# Patient Record
Sex: FEMALE | Race: WHITE | NOT HISPANIC OR LATINO | Employment: PART TIME | URBAN - METROPOLITAN AREA
[De-identification: names, ages, dates, MRNs, and addresses within clinical notes are randomized per-mention and may not be internally consistent; named-entity substitution may affect disease eponyms.]

---

## 2017-01-10 ENCOUNTER — GENERIC CONVERSION - ENCOUNTER (OUTPATIENT)
Dept: OTHER | Facility: OTHER | Age: 42
End: 2017-01-10

## 2017-01-18 ENCOUNTER — ALLSCRIPTS OFFICE VISIT (OUTPATIENT)
Dept: OTHER | Facility: OTHER | Age: 42
End: 2017-01-18

## 2017-02-01 ENCOUNTER — TRANSCRIBE ORDERS (OUTPATIENT)
Dept: ADMINISTRATIVE | Facility: HOSPITAL | Age: 42
End: 2017-02-01

## 2017-02-01 DIAGNOSIS — M54.9 BACKACHE, UNSPECIFIED: Primary | ICD-10-CM

## 2017-02-03 ENCOUNTER — ALLSCRIPTS OFFICE VISIT (OUTPATIENT)
Dept: OTHER | Facility: OTHER | Age: 42
End: 2017-02-03

## 2017-02-21 RX ORDER — TOPIRAMATE 100 MG/1
100 TABLET, FILM COATED ORAL 2 TIMES DAILY
COMMUNITY
End: 2018-02-12 | Stop reason: SDUPTHER

## 2017-02-21 RX ORDER — OMEPRAZOLE 40 MG/1
40 CAPSULE, DELAYED RELEASE ORAL DAILY
COMMUNITY
End: 2018-05-09 | Stop reason: ALTCHOICE

## 2017-02-21 RX ORDER — PROPRANOLOL HYDROCHLORIDE 60 MG/1
60 TABLET ORAL DAILY
COMMUNITY
End: 2018-05-09 | Stop reason: ALTCHOICE

## 2017-02-21 RX ORDER — FLUOXETINE HYDROCHLORIDE 90 MG/1
90 CAPSULE, DELAYED RELEASE PELLETS ORAL
COMMUNITY
End: 2018-05-09 | Stop reason: ALTCHOICE

## 2017-02-21 RX ORDER — MORPHINE SULFATE 30 MG/1
30 TABLET ORAL EVERY 4 HOURS PRN
COMMUNITY
End: 2018-05-09 | Stop reason: ALTCHOICE

## 2017-02-21 RX ORDER — BUSPIRONE HYDROCHLORIDE 30 MG/1
30 TABLET ORAL 4 TIMES DAILY PRN
COMMUNITY
End: 2019-04-10 | Stop reason: SDUPTHER

## 2017-02-21 RX ORDER — METHOCARBAMOL 750 MG/1
750 TABLET, FILM COATED ORAL 3 TIMES DAILY
COMMUNITY
End: 2018-05-09 | Stop reason: ALTCHOICE

## 2017-02-21 RX ORDER — MODAFINIL 200 MG/1
200 TABLET ORAL DAILY
COMMUNITY
End: 2018-02-12 | Stop reason: SDUPTHER

## 2017-02-23 ENCOUNTER — ANESTHESIA EVENT (OUTPATIENT)
Dept: PERIOP | Facility: HOSPITAL | Age: 42
End: 2017-02-23
Payer: COMMERCIAL

## 2017-02-24 ENCOUNTER — ANESTHESIA (OUTPATIENT)
Dept: PERIOP | Facility: HOSPITAL | Age: 42
End: 2017-02-24
Payer: COMMERCIAL

## 2017-02-24 ENCOUNTER — HOSPITAL ENCOUNTER (OUTPATIENT)
Facility: HOSPITAL | Age: 42
Setting detail: OUTPATIENT SURGERY
Discharge: HOME | End: 2017-02-24
Attending: INTERNAL MEDICINE | Admitting: INTERNAL MEDICINE
Payer: COMMERCIAL

## 2017-02-24 ENCOUNTER — GENERIC CONVERSION - ENCOUNTER (OUTPATIENT)
Dept: OTHER | Facility: OTHER | Age: 42
End: 2017-02-24

## 2017-02-24 VITALS
HEART RATE: 69 BPM | RESPIRATION RATE: 16 BRPM | DIASTOLIC BLOOD PRESSURE: 57 MMHG | TEMPERATURE: 98.1 F | BODY MASS INDEX: 24.88 KG/M2 | OXYGEN SATURATION: 100 % | WEIGHT: 145.72 LBS | HEIGHT: 64 IN | SYSTOLIC BLOOD PRESSURE: 109 MMHG

## 2017-02-24 DIAGNOSIS — K58.9 IBS (IRRITABLE BOWEL SYNDROME): ICD-10-CM

## 2017-02-24 DIAGNOSIS — K21.9 GERD (GASTROESOPHAGEAL REFLUX DISEASE): ICD-10-CM

## 2017-02-24 LAB — EXT PREGNANCY TEST URINE: NEGATIVE

## 2017-02-24 PROCEDURE — 88342 IMHCHEM/IMCYTCHM 1ST ANTB: CPT | Performed by: INTERNAL MEDICINE

## 2017-02-24 PROCEDURE — 88305 TISSUE EXAM BY PATHOLOGIST: CPT | Performed by: INTERNAL MEDICINE

## 2017-02-24 RX ORDER — PROPOFOL 10 MG/ML
INJECTION, EMULSION INTRAVENOUS AS NEEDED
Status: DISCONTINUED | OUTPATIENT
Start: 2017-02-24 | End: 2017-02-24 | Stop reason: SURG

## 2017-02-24 RX ORDER — SODIUM CHLORIDE, SODIUM LACTATE, POTASSIUM CHLORIDE, CALCIUM CHLORIDE 600; 310; 30; 20 MG/100ML; MG/100ML; MG/100ML; MG/100ML
50 INJECTION, SOLUTION INTRAVENOUS CONTINUOUS
Status: DISCONTINUED | OUTPATIENT
Start: 2017-02-24 | End: 2017-02-24 | Stop reason: HOSPADM

## 2017-02-24 RX ADMIN — PROPOFOL 100 MG: 10 INJECTION, EMULSION INTRAVENOUS at 10:13

## 2017-02-24 RX ADMIN — PROPOFOL 100 MG: 10 INJECTION, EMULSION INTRAVENOUS at 10:08

## 2017-02-24 RX ADMIN — PROPOFOL 50 MG: 10 INJECTION, EMULSION INTRAVENOUS at 10:19

## 2017-02-24 RX ADMIN — SODIUM CHLORIDE, SODIUM LACTATE, POTASSIUM CHLORIDE, AND CALCIUM CHLORIDE 50 ML/HR: .6; .31; .03; .02 INJECTION, SOLUTION INTRAVENOUS at 09:25

## 2017-03-01 ENCOUNTER — GENERIC CONVERSION - ENCOUNTER (OUTPATIENT)
Dept: OTHER | Facility: OTHER | Age: 42
End: 2017-03-01

## 2017-03-29 ENCOUNTER — ALLSCRIPTS OFFICE VISIT (OUTPATIENT)
Dept: OTHER | Facility: OTHER | Age: 42
End: 2017-03-29

## 2017-03-29 DIAGNOSIS — M54.16 RADICULOPATHY OF LUMBAR REGION: ICD-10-CM

## 2017-03-29 DIAGNOSIS — Z12.31 ENCOUNTER FOR SCREENING MAMMOGRAM FOR MALIGNANT NEOPLASM OF BREAST: ICD-10-CM

## 2017-03-29 DIAGNOSIS — M54.9 DORSALGIA: ICD-10-CM

## 2017-04-21 ENCOUNTER — ALLSCRIPTS OFFICE VISIT (OUTPATIENT)
Dept: OTHER | Facility: OTHER | Age: 42
End: 2017-04-21

## 2017-05-25 ENCOUNTER — ALLSCRIPTS OFFICE VISIT (OUTPATIENT)
Dept: OTHER | Facility: OTHER | Age: 42
End: 2017-05-25

## 2017-06-28 ENCOUNTER — ALLSCRIPTS OFFICE VISIT (OUTPATIENT)
Dept: OTHER | Facility: OTHER | Age: 42
End: 2017-06-28

## 2017-06-28 DIAGNOSIS — Z12.31 ENCOUNTER FOR SCREENING MAMMOGRAM FOR MALIGNANT NEOPLASM OF BREAST: ICD-10-CM

## 2017-08-01 ENCOUNTER — HOSPITAL ENCOUNTER (OUTPATIENT)
Dept: MAMMOGRAPHY | Facility: CLINIC | Age: 42
Discharge: HOME | End: 2017-08-01
Payer: COMMERCIAL

## 2017-08-01 DIAGNOSIS — Z12.31 ENCOUNTER FOR SCREENING MAMMOGRAM FOR MALIGNANT NEOPLASM OF BREAST: ICD-10-CM

## 2017-08-01 PROCEDURE — G0202 SCR MAMMO BI INCL CAD: HCPCS

## 2017-08-01 PROCEDURE — 77063 BREAST TOMOSYNTHESIS BI: CPT

## 2017-08-02 ENCOUNTER — ALLSCRIPTS OFFICE VISIT (OUTPATIENT)
Dept: OTHER | Facility: OTHER | Age: 42
End: 2017-08-02

## 2017-09-08 ENCOUNTER — GENERIC CONVERSION - ENCOUNTER (OUTPATIENT)
Dept: OTHER | Facility: OTHER | Age: 42
End: 2017-09-08

## 2017-09-19 ENCOUNTER — ALLSCRIPTS OFFICE VISIT (OUTPATIENT)
Dept: OTHER | Facility: OTHER | Age: 42
End: 2017-09-19

## 2017-09-25 NOTE — PRE-PROCEDURE INSTRUCTIONS
Pre-Surgery Instructions:   Medication Instructions    busPIRone (BUSPAR) 30 MG tablet Patient was instructed by Physician and understands   modafinil (PROVIGIL) 200 MG tablet Patient was instructed by Physician and understands

## 2017-09-26 ENCOUNTER — ANESTHESIA EVENT (OUTPATIENT)
Dept: PERIOP | Facility: HOSPITAL | Age: 42
End: 2017-09-26
Payer: COMMERCIAL

## 2017-09-27 ENCOUNTER — ANESTHESIA (OUTPATIENT)
Dept: PERIOP | Facility: HOSPITAL | Age: 42
End: 2017-09-27
Payer: COMMERCIAL

## 2017-09-27 ENCOUNTER — HOSPITAL ENCOUNTER (OUTPATIENT)
Facility: HOSPITAL | Age: 42
Setting detail: OUTPATIENT SURGERY
Discharge: HOME | End: 2017-09-27
Attending: SURGERY | Admitting: SURGERY
Payer: COMMERCIAL

## 2017-09-27 VITALS
SYSTOLIC BLOOD PRESSURE: 100 MMHG | HEART RATE: 66 BPM | HEIGHT: 64 IN | OXYGEN SATURATION: 98 % | RESPIRATION RATE: 13 BRPM | TEMPERATURE: 97.6 F | WEIGHT: 149 LBS | DIASTOLIC BLOOD PRESSURE: 58 MMHG | BODY MASS INDEX: 25.44 KG/M2

## 2017-09-27 PROBLEM — K40.90 LEFT INGUINAL HERNIA: Chronic | Status: ACTIVE | Noted: 2017-09-27

## 2017-09-27 LAB — EXT PREGNANCY TEST URINE: NEGATIVE

## 2017-09-27 PROCEDURE — C1781 MESH (IMPLANTABLE): HCPCS | Performed by: SURGERY

## 2017-09-27 PROCEDURE — 81025 URINE PREGNANCY TEST: CPT | Performed by: ANESTHESIOLOGY

## 2017-09-27 DEVICE — BARD SOFT MESH
Type: IMPLANTABLE DEVICE | Site: INGUINAL | Status: FUNCTIONAL
Brand: BARD SOFT MESH

## 2017-09-27 RX ORDER — FENTANYL CITRATE/PF 50 MCG/ML
50 SYRINGE (ML) INJECTION
Status: DISCONTINUED | OUTPATIENT
Start: 2017-09-27 | End: 2017-09-27 | Stop reason: HOSPADM

## 2017-09-27 RX ORDER — PROPOFOL 10 MG/ML
INJECTION, EMULSION INTRAVENOUS AS NEEDED
Status: DISCONTINUED | OUTPATIENT
Start: 2017-09-27 | End: 2017-09-27 | Stop reason: SURG

## 2017-09-27 RX ORDER — OXYCODONE HYDROCHLORIDE 5 MG/1
5 TABLET ORAL EVERY 4 HOURS PRN
Status: DISCONTINUED | OUTPATIENT
Start: 2017-09-27 | End: 2017-09-27 | Stop reason: HOSPADM

## 2017-09-27 RX ORDER — ROCURONIUM BROMIDE 10 MG/ML
INJECTION, SOLUTION INTRAVENOUS AS NEEDED
Status: DISCONTINUED | OUTPATIENT
Start: 2017-09-27 | End: 2017-09-27 | Stop reason: SURG

## 2017-09-27 RX ORDER — MEPERIDINE HYDROCHLORIDE 25 MG/ML
12.5 INJECTION INTRAMUSCULAR; INTRAVENOUS; SUBCUTANEOUS ONCE AS NEEDED
Status: DISCONTINUED | OUTPATIENT
Start: 2017-09-27 | End: 2017-09-27 | Stop reason: HOSPADM

## 2017-09-27 RX ORDER — MAGNESIUM HYDROXIDE 1200 MG/15ML
LIQUID ORAL AS NEEDED
Status: DISCONTINUED | OUTPATIENT
Start: 2017-09-27 | End: 2017-09-27 | Stop reason: HOSPADM

## 2017-09-27 RX ORDER — LIDOCAINE HYDROCHLORIDE 10 MG/ML
INJECTION, SOLUTION INFILTRATION; PERINEURAL AS NEEDED
Status: DISCONTINUED | OUTPATIENT
Start: 2017-09-27 | End: 2017-09-27 | Stop reason: SURG

## 2017-09-27 RX ORDER — DIPHENHYDRAMINE HYDROCHLORIDE 50 MG/ML
12.5 INJECTION INTRAMUSCULAR; INTRAVENOUS ONCE AS NEEDED
Status: DISCONTINUED | OUTPATIENT
Start: 2017-09-27 | End: 2017-09-27 | Stop reason: HOSPADM

## 2017-09-27 RX ORDER — MIDAZOLAM HYDROCHLORIDE 1 MG/ML
INJECTION INTRAMUSCULAR; INTRAVENOUS AS NEEDED
Status: DISCONTINUED | OUTPATIENT
Start: 2017-09-27 | End: 2017-09-27 | Stop reason: SURG

## 2017-09-27 RX ORDER — BUPIVACAINE HYDROCHLORIDE 2.5 MG/ML
INJECTION, SOLUTION EPIDURAL; INFILTRATION; INTRACAUDAL AS NEEDED
Status: DISCONTINUED | OUTPATIENT
Start: 2017-09-27 | End: 2017-09-27 | Stop reason: HOSPADM

## 2017-09-27 RX ORDER — GLYCOPYRROLATE 0.2 MG/ML
INJECTION INTRAMUSCULAR; INTRAVENOUS AS NEEDED
Status: DISCONTINUED | OUTPATIENT
Start: 2017-09-27 | End: 2017-09-27 | Stop reason: SURG

## 2017-09-27 RX ORDER — ONDANSETRON 2 MG/ML
INJECTION INTRAMUSCULAR; INTRAVENOUS AS NEEDED
Status: DISCONTINUED | OUTPATIENT
Start: 2017-09-27 | End: 2017-09-27 | Stop reason: SURG

## 2017-09-27 RX ORDER — EPHEDRINE SULFATE 50 MG/ML
INJECTION, SOLUTION INTRAVENOUS AS NEEDED
Status: DISCONTINUED | OUTPATIENT
Start: 2017-09-27 | End: 2017-09-27 | Stop reason: SURG

## 2017-09-27 RX ORDER — SODIUM CHLORIDE, SODIUM LACTATE, POTASSIUM CHLORIDE, CALCIUM CHLORIDE 600; 310; 30; 20 MG/100ML; MG/100ML; MG/100ML; MG/100ML
100 INJECTION, SOLUTION INTRAVENOUS CONTINUOUS
Status: DISCONTINUED | OUTPATIENT
Start: 2017-09-27 | End: 2017-09-27 | Stop reason: HOSPADM

## 2017-09-27 RX ORDER — ACETAMINOPHEN AND CODEINE PHOSPHATE 300; 30 MG/1; MG/1
1 TABLET ORAL EVERY 4 HOURS PRN
Qty: 30 TABLET | Refills: 0 | Status: SHIPPED | OUTPATIENT
Start: 2017-09-27 | End: 2017-10-07

## 2017-09-27 RX ORDER — FENTANYL CITRATE 50 UG/ML
INJECTION, SOLUTION INTRAMUSCULAR; INTRAVENOUS AS NEEDED
Status: DISCONTINUED | OUTPATIENT
Start: 2017-09-27 | End: 2017-09-27 | Stop reason: SURG

## 2017-09-27 RX ORDER — ONDANSETRON 2 MG/ML
4 INJECTION INTRAMUSCULAR; INTRAVENOUS EVERY 6 HOURS PRN
Status: DISCONTINUED | OUTPATIENT
Start: 2017-09-27 | End: 2017-09-27 | Stop reason: HOSPADM

## 2017-09-27 RX ORDER — MORPHINE SULFATE 2 MG/ML
2 INJECTION, SOLUTION INTRAMUSCULAR; INTRAVENOUS EVERY 2 HOUR PRN
Status: DISCONTINUED | OUTPATIENT
Start: 2017-09-27 | End: 2017-09-27 | Stop reason: HOSPADM

## 2017-09-27 RX ORDER — PROMETHAZINE HYDROCHLORIDE 25 MG/ML
25 INJECTION, SOLUTION INTRAMUSCULAR; INTRAVENOUS ONCE AS NEEDED
Status: DISCONTINUED | OUTPATIENT
Start: 2017-09-27 | End: 2017-09-27 | Stop reason: HOSPADM

## 2017-09-27 RX ADMIN — CEFAZOLIN SODIUM 2000 MG: 2 SOLUTION INTRAVENOUS at 09:25

## 2017-09-27 RX ADMIN — NEOSTIGMINE METHYLSULFATE 3 MG: 1 INJECTION, SOLUTION INTRAMUSCULAR; INTRAVENOUS; SUBCUTANEOUS at 09:52

## 2017-09-27 RX ADMIN — SODIUM CHLORIDE, SODIUM LACTATE, POTASSIUM CHLORIDE, AND CALCIUM CHLORIDE: .6; .31; .03; .02 INJECTION, SOLUTION INTRAVENOUS at 08:53

## 2017-09-27 RX ADMIN — LIDOCAINE HYDROCHLORIDE 50 MG: 10 INJECTION, SOLUTION INFILTRATION; PERINEURAL at 09:21

## 2017-09-27 RX ADMIN — PROPOFOL 200 MG: 10 INJECTION, EMULSION INTRAVENOUS at 09:21

## 2017-09-27 RX ADMIN — FENTANYL CITRATE 50 MCG: 50 INJECTION INTRAMUSCULAR; INTRAVENOUS at 10:37

## 2017-09-27 RX ADMIN — HYDROMORPHONE HYDROCHLORIDE 0.5 MG: 1 INJECTION, SOLUTION INTRAMUSCULAR; INTRAVENOUS; SUBCUTANEOUS at 10:45

## 2017-09-27 RX ADMIN — GLYCOPYRROLATE 0.6 MG: 0.2 INJECTION, SOLUTION INTRAMUSCULAR; INTRAVENOUS at 09:52

## 2017-09-27 RX ADMIN — EPHEDRINE SULFATE 10 MG: 50 INJECTION, SOLUTION INTRAMUSCULAR; INTRAVENOUS; SUBCUTANEOUS at 09:45

## 2017-09-27 RX ADMIN — ROCURONIUM BROMIDE 30 MG: 10 INJECTION, SOLUTION INTRAVENOUS at 09:21

## 2017-09-27 RX ADMIN — DEXAMETHASONE SODIUM PHOSPHATE 10 MG: 10 INJECTION INTRAMUSCULAR; INTRAVENOUS at 09:26

## 2017-09-27 RX ADMIN — FENTANYL CITRATE 50 MCG: 50 INJECTION INTRAMUSCULAR; INTRAVENOUS at 10:32

## 2017-09-27 RX ADMIN — FENTANYL CITRATE 100 MCG: 50 INJECTION, SOLUTION INTRAMUSCULAR; INTRAVENOUS at 09:21

## 2017-09-27 RX ADMIN — MIDAZOLAM HYDROCHLORIDE 2 MG: 1 INJECTION, SOLUTION INTRAMUSCULAR; INTRAVENOUS at 09:11

## 2017-09-27 RX ADMIN — ONDANSETRON 4 MG: 2 INJECTION INTRAMUSCULAR; INTRAVENOUS at 09:52

## 2017-09-27 NOTE — DISCHARGE INSTRUCTIONS
No diet restriction for this surgery  May shower every day  Remove dressings in 3 days  Remove strips in 7 days  Call office to make an appointment in 2 weeks  Call office with any issues regarding the surgery  No driving, heavy lifting or strenuous exercise for one week  Resume home medications  Apply ice to the incisions  May take Tylenol 3, regular Tylenol or ibuprofen for pain    Inguinal Hernia   WHAT YOU NEED TO KNOW:   An inguinal hernia happens when organs or abdominal tissue push through a weak spot in the abdominal wall  The abdominal wall is made of fat and muscle  It holds the intestines in place  The hernia may contain fluid, tissue from the abdomen, or part of an organ (such as an intestine)  DISCHARGE INSTRUCTIONS:   Return to the emergency department if:   · You have severe abdominal pain with nausea and vomiting  · Your abdomen is larger than usual      · Your hernia gets bigger or is purple or blue  · You see blood in your bowel movements  · You feel weak, dizzy, or faint  Contact your healthcare provider if:   · You have a fever  · You have questions or concerns about your condition or care  Medicine: You may  need the following:  · NSAIDs , such as ibuprofen, help decrease swelling, pain, and fever  NSAIDs can cause stomach bleeding or kidney problems in certain people  If you take blood thinner medicine, always ask your healthcare provider if NSAIDs are safe for you  Always read the medicine label and follow directions  · Take your medicine as directed  Contact your healthcare provider if you think your medicine is not helping or if you have side effects  Tell him or her if you are allergic to any medicine  Keep a list of the medicines, vitamins, and herbs you take  Include the amounts, and when and why you take them  Bring the list or the pill bottles to follow-up visits  Carry your medicine list with you in case of an emergency    Follow up with your healthcare provider as directed:  Write down your questions so you remember to ask them during your visits  Manage your symptoms and prevent another hernia:   · Do not lift anything heavy  Heavy lifting can make your hernia worse or cause another hernia  Ask your healthcare provider how much is safe for you to lift  · Drink liquids as directed  Liquids may prevent constipation and straining during a bowel movement  Ask how much liquid to drink each day and which liquids are best for you  · Eat foods high in fiber  Fiber may prevent constipation and straining during a bowel movement  Foods that contain fiber include fruits, vegetables, beans, lentils, and whole grains  · Maintain a healthy weight  If you are overweight, weight loss may prevent your hernia from getting worse  It may also prevent another hernia  Talk to your healthcare provider about exercise and how to lose weight safely if you are overweight  · Do not smoke  Nicotine and other chemicals in cigarettes and cigars can weaken the abdominal wall  This may increase your risk for another hernia  Ask your healthcare provider for information if you currently smoke and need help to quit  E-cigarettes or smokeless tobacco still contain nicotine  Talk to your healthcare provider before you use these products  © 2017 [facility] Information is for End User's use only and may not be sold, redistributed or otherwise used for commercial purposes  All illustrations and images included in CareNotes® are the copyrighted property of A D A M , Inc  or [place]  The above information is an  only  It is not intended as medical advice for individual conditions or treatments  Talk to your doctor, nurse or pharmacist before following any medical regimen to see if it is safe and effective for you

## 2017-10-19 ENCOUNTER — GENERIC CONVERSION - ENCOUNTER (OUTPATIENT)
Dept: OTHER | Facility: OTHER | Age: 42
End: 2017-10-19

## 2017-10-20 ENCOUNTER — ALLSCRIPTS OFFICE VISIT (OUTPATIENT)
Dept: OTHER | Facility: OTHER | Age: 42
End: 2017-10-20

## 2017-10-21 NOTE — PROGRESS NOTES
Assessment  1  Insomnia, persistent (307 42) (G47 00)    Plan  Insomnia, persistent    · Follow Up if Not Better Evaluation and Treatment  Follow-up  Status: Complete  Done:  69Csl9648   · Avoid alcoholic beverages ; Status:Complete;   Done: 07LTI5877   · Avoid foods and beverages that contain caffeine ; Status:Complete;   Done: 14VXB1332   · Avoid over-the-counter cold remedies unless recommended by us ; Status:Complete;    Done: 20ICP9490   · Decreasing the stress in your life may help your condition improve ; Status:Complete;    Done: 41QYK0809   · Do not eat anything for at least 2 hours before going to bed ; Status:Complete;   Done:  85Oji3293   · Call [phone] if: The symptoms seem worse ; Status:Complete;   Done:  40NBQ5398   · Call [phone] if: You have feelings of extreme sadness and feelings of  hopelessness ; Status:Complete;   Done: 49SOW6553   · Call [phone] if: You have symptoms of anxiety ; Status:Complete;   Done:  88HMQ4302   · Call [phone] if: You still are unable to sleep through the night after 2 weeks ;  Status:Complete;   Done: 77Dbx4814  Insomnia, unspecified type    · Zolpidem Tartrate 5 MG Oral Tablet; TAKE 1 TABLET AT BEDTIME AS NEEDED FOR  SLEEP    Discussion/Summary  The patient was counseled regarding diagnostic results,-- instructions for management,-- risk factor reductions,-- prognosis,-- patient and family education,-- impressions  Possible side effects of new medications were reviewed with the patient/guardian today  The treatment plan was reviewed with the patient/guardian  The patient/guardian understands and agrees with the treatment plan      Chief Complaint  patient is in for follow up today   Patient is here today for follow up of chronic conditions described in HPI  History of Present Illness  The patient is being seen for follow-up of insomnia  The patient reports doing poorly  Comorbid Illnesses: anxiety-- and-- depression     Recent hernia repair   Medications:  the patient is adherent to her medication regimen, but-- she denies medication side effects  Diet: She consumes a diverse and healthy diet  Exercise: She exercises regularly  Smoking: She does not use tobacco Alcohol: She denies alcohol use  Drug Use: She denies drug use  The patient sleeps 5 hours per night and has 5-6 episodes of insomnia per week  Additional history: gets up at 3am for work  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   Review of Systems    Constitutional: feeling poorly-- and-- feeling tired  Eyes: No complaints of eye pain, no red eyes, no eyesight problems, no discharge, no dry eyes, no itching of eyes  ENT: no complaints of earache, no loss of hearing, no nose bleeds, no nasal discharge, no sore throat, no hoarseness  Cardiovascular: No complaints of slow heart rate, no fast heart rate, no chest pain, no palpitations, no leg claudication, no lower extremity edema  Respiratory: No complaints of shortness of breath, no wheezing, no cough, no SOB on exertion, no orthopnea, no PND  Gastrointestinal: No complaints of abdominal pain, no constipation, no nausea or vomiting, no diarrhea, no bloody stools  Genitourinary: No complaints of dysuria, no incontinence, no pelvic pain, no dysmenorrhea, no vaginal discharge or bleeding  Musculoskeletal: arthralgias-- and-- myalgias  Integumentary: No complaints of skin rash or lesions, no itching, no skin wounds, no breast pain or lump  Neurological: headache  Psychiatric: anxiety,-- sleep disturbances-- and-- depression  Endocrine: No complaints of proptosis, no hot flashes, no muscle weakness, no deepening of the voice, no feelings of weakness  Over the past 2 weeks, how often have you been bothered by the following problems? 1 ) Little interest or pleasure in doing things? Not at all    2 ) Feeling down, depressed or hopeless? Not at all    3 ) Trouble falling asleep or sleeping too much?  Nearly every day    4 ) Feeling tired or having little energy? Several days  5 ) Poor appetite or overeating? Not at all    6 ) Feeling bad about yourself, or that you are a failure, or have let yourself or your family down? Not at all    7 ) Trouble concentrating on things, such as reading a newspaper or watching television? Several days  8 ) Moving or speaking so slowly that other people could have noticed, or the opposite, moving or speaking faster than usual? Not at all    9 ) Thoughts that you would be better off dead or of hurting yourself in some way? Not at all  Active Problems  1  Anxiety (300 00) (F41 9)   2  Arthralgia (719 40) (M25 50)   3  Back pain, acute (724 5) (M54 9)   4  Back pain, chronic (724 5,338 29) (M54 9,G89 29)   5  Bilateral impacted cerumen (380 4) (H61 23)   6  Chronic lumbar radiculopathy (724 4) (M54 16)   7  Chronic pain syndrome (338 4) (G89 4)   8  Depression (311) (F32 9)   9  Encounter for screening mammogram for breast cancer (V76 12) (Z12 31)   10  GERD without esophagitis (530 81) (K21 9)   11  Headache (784 0) (R51)   12  Hernia (553 9) (K46 9)   13  IBS (irritable bowel syndrome) (564 1) (K58 9)   14  Insomnia, unspecified type (780 52) (G47 00)   15  Irritability and anger (799 22) (R45 4)   16  Left inguinal hernia (550 90) (K40 90)   17  Lipoma of back (214 8) (D17 1)   18  Lumbar disc herniation (722 10) (M51 26)   19  Lumbar radiculopathy (724 4) (M54 16)   20  Malaise and fatigue (780 79) (R53 81,R53 83)   21  Mid back pain (724 5) (M54 9)   22  Migraine headache (346 90) (G43 909)   23  Need for influenza vaccination (V04 81) (Z23)   24  Postoperative state (V45 89) (Z98 890)   25  Rectal bleeding (569 3) (K62 5)   26  Shift work sleep 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 36) (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)    Past Medical History  1  History of Anxiety state (300 00) (F41 1)   2  History of Depressive disorder (311) (F32 9)   3  History of  2   4   History of Other symptoms involving skin and integumentary tissues (782 9) (R23 8)    The active problems and past medical history were reviewed and updated today  Surgical History  1  History of Cervical Loop Electrosurgical Excision (LEEP)   2  History of Hernia Repair    The surgical history was reviewed and updated today  Family History  Mother    1  Family history of depression (V17 0) (Z81 8)   2  Denied: Family history of mental disorder   3  Denied: Family history of Illicit drug use  Grandparent    4  Family history of malignant neoplasm (V16 9) (Z80 9)  Maternal Grandmother    5  Family history of malignant neoplasm of breast (V16 3) (Z80 3)    The family history was reviewed and updated today  Social History   · Daily caffeine consumption   · Denies alcohol consumption (V49 89) (Z78 9)   · Has 2 children   · Never a smoker   · No drug use   · Occupation   · Single  The social history was reviewed and updated today  The social history was reviewed and is unchanged  Current Meds   1  Amitriptyline HCl - 25 MG Oral Tablet; TAKE 1 TABLET AT BEDTIME; Therapy: 04Fpr3916 to (Evaluate:56Ptr5834)  Requested for: 42SGN2444; Last   Rx:06Oct2017 Ordered   2  ARIPiprazole 10 MG Oral Tablet; TAKE 1 TABLET DAILY; Therapy: 48Nqf3032 to (Evaluate:31Ifn0551)  Requested for: 21Vhu2533; Last   Rx:60Avh5913 Ordered   3  B Complex 100 TR Oral Tablet Extended Release; TAKE 1 TABLET DAILY; Therapy: 06Bej5100 to (Evaluate:26Brt5361)  Requested for: 87Tsk1719; Last   Rx:92Fbq5093 Ordered   4  Modafinil 200 MG Oral Tablet; TAKE 1 TABLET BY MOUTH AS DIRECTED; Therapy: 80WDU4405 to (Evaluate:82Iim0708)  Requested for: 96VZA5582; Last   Rx:64Gdp6026 Ordered   5  Propranolol HCl - 60 MG Oral Tablet; takw one pill dialy; Therapy: 48YSM0311 to ([phone])  Requested for: 21Oct2016; Last   Rx:21Oct2016 Ordered   6  Topiramate 100 MG Oral Tablet; TAKE 1 TABLET TWICE DAILY;    Therapy: 57CPF9465 to (Evaluate:00Qen2730)  Requested for: 54Muh6649; Last Rx: 14XWC7804 Ordered    The medication list was reviewed and updated today  Allergies  1  No Known Drug Allergies    Vitals  Vital Signs    Recorded: 76NMO7807 11:44AM   Temperature 98 2 F   Heart Rate 77   Respiration 17   Systolic 100   Diastolic 82   Height 5 ft 4 in   Weight 155 lb    BMI Calculated 26 61   BSA Calculated 1 76   O2 Saturation 98     Physical Exam    Constitutional   General appearance: No acute distress, well appearing and well nourished  Eyes   Conjunctiva and lids: No swelling, erythema or discharge  Pupils and irises: Equal, round and reactive to light  Ears, Nose, Mouth, and Throat   Otoscopic examination: Tympanic membranes translucent with normal light reflex  Canals patent without erythema  Nasal mucosa, septum, and turbinates: Normal without edema or erythema  Oropharynx: Normal with no erythema, edema, exudate or lesions  Pulmonary   Respiratory effort: No increased work of breathing or signs of respiratory distress  Auscultation of lungs: Clear to auscultation  Cardiovascular   Auscultation of heart: Normal rate and rhythm, normal S1 and S2, without murmurs  Examination of extremities for edema and/or varicosities: Normal     Carotid pulses: Normal     Abdomen   Abdomen: Abnormal   hernia cm mass palpated in the left upper quadrant  -- left inguinal hernia noted,  Musculoskeletal   Gait and station: Normal     Digits and nails: Normal without clubbing or cyanosis  Inspection/palpation of joints, bones, and muscles: Normal     Skin   Skin and subcutaneous tissue: Normal without rashes or lesions  Neurologic   Cranial nerves: Cranial nerves 2-12 intact  Reflexes: 2+ and symmetric  Psychiatric   Orientation to person, place, and time: Normal     Mood and affect: Normal          Results/Data  MAMMO SCREENING BILATERAL W 3D & CAD 23Hcr1737 01:52PM [facility] Order Number: [id number]    - Patient Instructions:  To schedule this appointment, please contact Central Scheduling at [phone]  Do not wear any perfume, powder, lotion or deodorant on breast or underarm area  Please bring your doctors order, referral (if needed) and insurance information with you on the day of the test  Failure to bring this information may result in this test being rescheduled  Arrive 15 minutes prior to your appointment time to register  On the day of your test, please bring any prior mammogram or breast studies with you that were not performed at a [facility]  Failure to bring prior exams may result in your test needing to be rescheduled  Test Name Result Flag Reference   MAMMO SCREENING BILATERAL W 3D & CAD (Report)     Patient History:   Patient has history of endometrial cancer at age 30  Family history of breast cancer at age 48 in maternal    grandmother  Patient has never smoked  Patient's BMI is 24 9  Reason for exam: screening, asymptomatic  Mammo Screening Bilateral W DBT and CAD: August 1, 2017 - Check    In #: [de-identified]   2D/3D Procedure   3D Bilateral CC and MLO view(s) were taken  2D Bilateral CC and MLO view(s) were taken  Technologist: DANY Carr (DANY)(M)   The breast tissue is heterogeneously dense, potentially limiting    the sensitivity of mammography  Patient risk, included in this    report, assists in determining the appropriate screening regimen    (such as 3-D mammography or the inclusion of automated breast    ultrasound or MRI)  3-D mammography may also remain indicated as    screening  Bilateral digital mammography was performed as a baseline study  No dominant soft tissue mass, architectural distortion or    suspicious calcifications are noted in either breast   The skin    and nipple contours are within normal limits  ACR BI-RADSï¾® Assessments: BiRad:1 - Negative     Recommendation:   Routine screening mammogram of both breasts in 1 year   A    reminder letter will be scheduled  The patient is scheduled in a reminder system  8-10% of cancers will be missed on mammography  Management of a    palpable abnormality must be based on clinical grounds  Patients    will be notified of their results via letter from our facility  Accredited by [employer] of Radiology and FDA  Transcription Location: DANY Sims 98: [id number]     Risk Value(s):   Tyrer-Cuzick 10 Year: 1 300%, Tyrer-Cuzick Lifetime: 10 500%,    Myriad Table: 1 5%, ABBEY 5 Year: 0 4%, NCI Lifetime: 6 7%     EGD 42Mut5674 04:26PM [facility]     Test Name Result Flag Reference   EGD 02/24/2017       Summary / No summary entered :      No summary entered  Documents attached :      April Gant; Enc: [id number] - Appointment - [facility] -      (Gastroenterology Adult) (Result Document)  COLONOSCOPY [id number] 04:26PM [facility]     Test Name Result Flag Reference   Colonoscopy 02/24/2017       Summary / No summary entered :      No summary entered  Documents attached :      sColonoscopies - [facility]; Enc: [id number] - Appointment - [facility] -      (Gastroenterology Adult) (Result Document)  (1) TISSUE EXAM 38GHB9926 10:10AM [facility]     Test Name Result Flag Reference   LAB AP CASE REPORT (Report)     Surgical Pathology Report             Case: [id number]                   Authorizing Provider: Dominick Mohan MD  Collected:      02/24/2017 1010        Ordering Location:   [facility] Received:      02/24/2017 1403                    Operating Room                                 Pathologist:      Carleen Valles MD                                 Specimens:  A) - Duodenum, Duodenum, cold bx r/o celiac                              B) - Stomach, Stomach, cold bx r/o H  pylori                              C) - Esophagus, Mid esophagus, cold bx r/o eosinophilic esophagitis   LAB AP FINAL DIAGNOSIS (Report)     A   Duodenum (biopsy):    - Small bowel mucosa with no significant pathologic abnormalities  - No villous atrophy, increased intraepithelial lymphocytes or crypt   hyperplasia to suggest     malabsorptive enteropathy     - No active inflammation, granulomas, organisms, dysplasia or neoplasia   identified  B  Stomach (biopsy):    - Gastric oxyntic and foveolar mucosa with no significant pathologic   abnormality     - Immunostain for H  pylori (with appropriate positive control) is   negative  - No intestinal metaplasia, dysplasia or neoplasia identified  C  Mid esophagus (biopsy):    - Squamous mucosa with no pathologic abnormality      - Eosinophils number less than 2 per HPF      - No dysplasia or neoplasia identified  Electronically signed by Clint Mathews MD on 2/28/2017 at 11:03 AM   LAB AP NOTE      Interpretation performed at [facility], [facility], [address] [place] [id number]  LAB AP SURGICAL ADDITIONAL INFORMATION (Report)     These tests were developed and their performance characteristics   determined by [facility] or [facility]  They may not be cleared or approved by the [place]  Food and   Drug Administration  The FDA has determined that such clearance or   approval is not necessary  These tests are used for clinical purposes  They should not be regarded as investigational or for research  This   laboratory has been approved by CLIA 88, designated as a high-complexity   laboratory and is qualified to perform these tests  LAB AP GROSS DESCRIPTION (Report)     A  The specimen is received in formalin, labeled with the patient's name   and hospital number, and is designated duodenum biopsy rule out celiac  The specimen consists of two, rubbery, tan-brown tissue fragments each   measuring up to 0 3 cm in greatest dimension  Entirely submitted  One   cassette  B   The specimen is received in formalin, labeled with the patient's name   and hospital number, and is designated stomach biopsy rule out H    pylori  The specimen consists of several, rubbery, tan-brown tissue   fragments measuring 0 8 x 0 8 x 0 2 cm in aggregate dimension  Entirely   submitted  One cassette  C  The specimen is received in formalin, labeled with the patient's name   and hospital number, and is designated mid esophagus biopsy rule out   eosinophilic esophagitis  The specimen consists of a single, rubbery,   tan-brown tissue fragment measuring up to 0 3 cm in greatest dimension  Entirely submitted  One cassette  Note: The estimated total formalin fixation time based upon information   provided by the submitting clinician and the standard processing schedule   is over 72 hours  SRS   LAB AP CLINICAL INFORMATION      GERD (gastroesophageal reflux disease)  ???  IBS (irritable bowel syndrome)  Cold bx r/o celiac     (1) LIPID PANEL, FASTING [id number] 10:20AM [facility]     Test Name Result Flag Reference   CHOLESTEROL, TOTAL 183 mg/dL  125-200   HDL CHOLESTEROL 67 mg/dL  > OR = 46   TRIGLICERIDES 50 mg/dL  <973   LDL-CHOLESTEROL 106 mg/dL (calc)  <130   Desirable range <100 mg/dL for patients with CHD or  diabetes and <70 mg/dL for diabetic patients with  known heart disease  CHOL/HDLC RATIO 2 7 (calc)  < OR = 5 0   NON HDL CHOLESTEROL 116 mg/dL (calc)     Target for non-HDL cholesterol is 30 mg/dL higher than   LDL cholesterol target  (1) COMPREHENSIVE METABOLIC PANEL 37PNJ8169 64:97VA [facility]     Test Name Result Flag Reference   GLUCOSE 81 mg/dL  65-99   Fasting reference interval   UREA NITROGEN (BUN) 15 mg/dL  7-25   CREATININE 0 89 mg/dL  0 50-1 10   eGFR NON-AFR   AMERICAN 82 mL/min/1 73m2  > OR = 60   eGFR AFRICAN AMERICAN 95 mL/min/1 73m2  > OR = 60   BUN/CREATININE RATIO   2-03   NOT APPLICABLE (calc)   SODIUM 136 mmol/L  135-146   POTASSIUM 4 0 mmol/L  3 5-5 3   CHLORIDE 105 mmol/L     CARBON DIOXIDE 23 mmol/L  20-31   CALCIUM 9 1 mg/dL  8 6-10 2   PROTEIN, TOTAL 6 9 g/dL  6 1-8 1 ALBUMIN 4 4 g/dL  3 6-5 1   GLOBULIN 2 5 g/dL (calc)  1 9-3 7   ALBUMIN/GLOBULIN RATIO 1 8 (calc)  1 0-2 5   BILIRUBIN, TOTAL 0 6 mg/dL  0 2-1 2   ALKALINE PHOSPHATASE 57 U/L     AST 15 U/L  10-30   ALT 16 U/L  6-29     (1) CBC/PLT/DIFF [id number] 10:20AM [employer] [employer]     Test Name Result Flag Reference   WHITE BLOOD CELL COUNT 5 8 Thousand/uL  3 8-10 8   RED BLOOD CELL COUNT 4 80 Million/uL  3 80-5 10   HEMOGLOBIN 14 7 g/dL  11 7-15 5   HEMATOCRIT 44 1 %  35 0-45 0   MCV 91 8 fL  80 0-100 0   MCH 30 6 pg  27 0-33 0   MCHC 33 4 g/dL  32 0-36 0   RDW 12 7 %  11 0-15 0   PLATELET COUNT 674 Thousand/uL  140-400   ABSOLUTE NEUTROPHILS 3538 cells/uL  7350-4852   ABSOLUTE LYMPHOCYTES 1815 cells/uL  850-3900   ABSOLUTE MONOCYTES 406 cells/uL  200-950   ABSOLUTE EOSINOPHILS 17 cells/uL     ABSOLUTE BASOPHILS 23 cells/uL  0-200   NEUTROPHILS 61 0 %     LYMPHOCYTES 31 3 %     MONOCYTES 7 0 %     EOSINOPHILS 0 3 %     BASOPHILS 0 4 %     MPV 8 6 fL  3 8-99 9     (Q) HELICOBACTER PYLORI UREA BREATH TEST, INFRARED (UBIT) [id number] 10:20AM [employer]     Test Name Result Flag Reference   RESULT: NOT DETECTED  NOT DETECTED   Antimicrobials, proton pump inhibitors, and bismuth  preparations are known to suppress H  pylori, and   ingestion of these prior to H  pylori diagnostic testing  may lead to false negative results  If clinically   indicated, the test may be repeated on a new specimen  obtained two weeks after discontinuing treatment  Attending Note  Collaborating Note: I supervised the Advanced Practitioner-- and-- I agree with the Advanced Practitioner note        Future Appointments    Date/Time Provider Specialty Site   11/07/2017 03:40 PM Ana Cabrera MD Neurology [facility] [address] [place] [address]   Electronically signed by : Xiomara Astudillo, PACOct 20 2017  1:45PM EST                       (Author)    Electronically signed by : Sari Tuttle DO; Oct 20 2017  2:06PM EST (Co-author)

## 2017-11-01 ENCOUNTER — GENERIC CONVERSION - ENCOUNTER (OUTPATIENT)
Dept: OTHER | Facility: OTHER | Age: 42
End: 2017-11-01

## 2017-11-07 ENCOUNTER — ALLSCRIPTS OFFICE VISIT (OUTPATIENT)
Dept: OTHER | Facility: OTHER | Age: 42
End: 2017-11-07

## 2017-11-07 ENCOUNTER — TRANSCRIBE ORDERS (OUTPATIENT)
Dept: ADMINISTRATIVE | Facility: HOSPITAL | Age: 42
End: 2017-11-07

## 2017-11-07 DIAGNOSIS — G43.009 MIGRAINE WITHOUT AURA AND WITHOUT STATUS MIGRAINOSUS, NOT INTRACTABLE: ICD-10-CM

## 2017-11-07 DIAGNOSIS — M79.673 PAIN OF FOOT: ICD-10-CM

## 2017-11-07 DIAGNOSIS — G43.009 MIGRAINE WITHOUT AURA AND WITHOUT STATUS MIGRAINOSUS, NOT INTRACTABLE: Primary | ICD-10-CM

## 2017-11-08 NOTE — CONSULTS
Assessment  1  Migraine without aura and without status migrainosus, not intractable (346 10) (G43 009)    Plan  Migraine without aura and without status migrainosus, not intractable    · SUMAtriptan Succinate 100 MG Oral Tablet (Imitrex); take 1 tablet twice a day as  needed   Rx By: Deb Perez; Dispense: 0 Days ; #:9 Tablet; Refill: 5;For: Migraine without aura and without status migrainosus, not intractable; RADHA = N; Verified Transmission to [facility]/PHARMACY #[id number] Last Updated By: System, SureScript[employer]; 11/7/2017 4:11:53 PM   · * MRI BRAIN WO CONTRAST; Status:Need Information - Financial Authorization; Requested [facility]:[id number];    Perform:[facility] Radiology; JJU:[id number]; Ordered; For:Migraine without aura and without status migrainosus, not intractable; Ordered By:Samantha Buckner;   · Follow-up visit in 2 months Evaluation and Treatment  Follow-up  Status: Complete   Done: 96SLS3451   Ordered; For: Migraine without aura and without status migrainosus, not intractable; Ordered By: Deb Perez Performed:  Due: 70OSG9111; Last Updated By: Sudhakar Padilla; 11/7/2017 4:12:58 PM    Discussion/Summary  Discussion Summary:   Suspect April is having migraine headaches  We did discuss various triggers for migraine and recommended keeping a headache calendar  Have recommended a trial of Imitrex at the onset of her headache and hopes to reduce the duration of the headache  We discussed potential adverse effects of the medication and also how to take the medication  If it is not effective or not tolerated she will notify me otherwise I will see her back in follow-up in 2 months  For completeness sake will obtain an MRI of the head to rule out other secondary etiologies  Chief Complaint  Chief Complaint Free Text Note Form: Patient is a 38year old right handed lady referred by OLIVER Garsia, for occipital headaches w/nausea and vomiting        History of Present Illness  HPI: April presents today with the above complaints  She states she has had headaches all of her life  They typically localized to the posterior head region or radiate into the frontal head area  They are often a pressure throbbing type pain associated with photophobia, sonophobia and nausea  She states that occasionally she does vomit with her headaches  She states that she used to have headaches about once a month that would last for a couple of days but now she is having headaches twice a month and the headaches last anywhere from 4-7 days  She states she has tried over-the-counter medications such as Advil, Aleve or Tylenol and has not noticed any improvement in her headaches  She states she has never been on an abortive medication for her headaches  She is currently on Topamax taking 100 milligrams twice daily but still has not noticed any improvement in her headache frequency  In the past she states amitriptyline was tried but she did not notice any improvement in her headache frequency with that either  She states no workup has been done for her headache in the past  She has not noticed any triggers with respect to her migraine headaches  She denies any aura  She states her mother has a history of headaches      Review of Systems  Neurological ROS:   Constitutional: fatigue  HEENT:  no sinus problems, not feeling congested, no blurred vision, no dryness of the eyes, no eye pain, no hearing loss, no tinnitus, no mouth sores, no sore throat, no hoarseness, no dysphagia, no masses, no bleeding  Cardiovascular:  no chest pain or pressure, no palpitations present, the heart rate was not rapid or irregular, no swelling in the arms or legs, no poor circulation  Respiratory:  no unusual or persistant cough, no shortness of breath with or without exertion  Gastrointestinal:  no nausea, no vomiting, no diarrhea, no abdominal pain, no changes in bowel habits, no melena, no loss of bowel control     Musculoskeletal: head/neck/back pain-- and-- pain while walking  Integumentary  no masses, no rash, no skin lesions, no livedo reticularis  Psychiatric: anxiety-- and-- depression  Endocrine hair loss or gain  Hematologic/Lymphatic:  no unusual bleeding, no tendency for easy bruising, no clotting skin or lumps  Neurological General: headache,-- lightheadedness-- and-- waking up at night  Neurological Mental Status: memory problems  Neurological Coordination: balance difficulties  Neurological Sensory:  no numbness, no pain, no tingling, does not fall when eyes closed or taking a shower  Neurological Gait: falling to one side  ROS Reviewed:   ROS reviewed  Active Problems  1  Anxiety (300 00) (F41 9)   2  Arthralgia (719 40) (M25 50)   3  Back pain, acute (724 5) (M54 9)   4  Back pain, chronic (724 5,338 29) (M54 9,G89 29)   5  Bilateral impacted cerumen (380 4) (H61 23)   6  Carpal tunnel syndrome, bilateral (354 0) (G56 03)   7  Chronic lumbar radiculopathy (724 4) (M54 16)   8  Chronic pain syndrome (338 4) (G89 4)   9  Depression (311) (F32 9)   10  Encounter for screening mammogram for breast cancer (V76 12) (Z12 31)   11  GERD without esophagitis (530 81) (K21 9)   12  Headache (784 0) (R51)   13  Hernia (553 9) (K46 9)   14  IBS (irritable bowel syndrome) (564 1) (K58 9)   15  Insomnia, persistent (307 42) (G47 00)   16  Insomnia, unspecified type (780 52) (G47 00)   17  Irritability and anger (799 22) (R45 4)   18  Left inguinal hernia (550 90) (K40 90)   19  Lipoma of back (214 8) (D17 1)   20  Lumbar disc herniation (722 10) (M51 26)   21  Lumbar radiculopathy (724 4) (M54 16)   22  Malaise and fatigue (780 79) (R53 81,R53 83)   23  Mid back pain (724 5) (M54 9)   24  Migraine headache (346 90) (G43 909)   25  Need for influenza vaccination (V04 81) (Z23)   26  Postoperative state (V45 89) (Z98 890)   27  Rectal bleeding (569 3) (K62 5)   28  Shift work sleep 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 36) (G47 26)    Past Medical History  1   History of Anxiety state (300 00) (F41 1)   2  History of Depressive disorder (311) (F32 9)   3  History of  2   4  History of Other symptoms involving skin and integumentary tissues (782 9) (R23 8)  Active Problems And Past Medical History Reviewed: The active problems and past medical history were reviewed and updated today  Surgical History  1  History of Cervical Loop Electrosurgical Excision (LEEP)   2  History of Hernia Repair  Surgical History Reviewed: The surgical history was reviewed and updated today  Family History  Mother    1  Family history of depression (V17 0) (Z81 8)   2  Denied: Family history of mental disorder   3  Denied: Family history of Illicit drug use  Grandparent    4  Family history of malignant neoplasm (V16 9) (Z80 9)  Maternal Grandmother    5  Family history of malignant neoplasm of breast (V16 3) (Z80 3)  Family History Reviewed: The family history was reviewed and updated today  Social History   · Daily caffeine consumption   · Denies alcohol consumption (V49 89) (Z78 9)   · Has 2 children   · Never a smoker   · No drug use   · Occupation   · Single  Social History Reviewed: The social history was reviewed and updated today  Current Meds   1  Modafinil 200 MG Oral Tablet; TAKE 1 TABLET BY MOUTH AS DIRECTED; Therapy: 63WES7897 to (Evaluate:83Qzj2010)  Requested for: 10LCP5977; Last   Rx:36Twc9933 Ordered   2  Topiramate 100 MG Oral Tablet; TAKE 1 TABLET TWICE DAILY; Therapy: 45HEG8188 to (Evaluate:40Gek9299)  Requested for: 22Wsm9498; Last   Rx:50Edw6900 Ordered  Medication List Reviewed: The medication list was reviewed and updated today  Allergies  1   No Known Drug Allergies    Vitals  Signs   Recorded: 52FLL0716 31:67LX   Systolic: 971, LUE, Standing  Diastolic: 76, LUE, Standing  Recorded: 06IMX6828 03:40PM   Heart Rate: 72  Systolic: 270, LUE, Sitting  Diastolic: 70, LUE, Sitting  Height: 5 ft 3 25 in  Weight: 160 lb 2 oz  BMI Calculated: 27 49  BSA Calculated: 1 78  Pain Scale: 0    Physical Exam    GENERAL:  Cooperative in no acute distress  Well-developed and well-nourished    HEAD and NECK   Head is atraumatic normocephalic with no lesions or masses  Neck is supple with full range of motion    CARDIOVASCULAR  Carotid Arteries-no carotid bruits  NEUROLOGIC:  Mental Status-the patient is awake alert and oriented without aphasia or apraxia  Cranial Nerves: Visual fields are full to confrontation  Discs are flat  Extraocular movements are full without nystagmus  Pupils are 2-1/2 mm and reactive  Face is symmetrical to light touch  Movements of facial expression move symmetrically  Hearing is normal to finger rub bilaterally  Soft palate lifts symmetrically  Shoulder shrug is symmetrical  Tongue is midline without atrophy  Motor: No drift is noted on arm extension  Strength is full in the upper and lower extremities with normal bulk and tone  Sensory: Intact to temperature and vibratory sensation in the upper and lower extremities bilaterally  Cortical function is intact  Coordination: Finger to nose testing is performed accurately  Romberg is negative  Gait reveals a normal base with symmetrical arm swing   Tandem walk is normal   Reflexes:  1+ and symmetrical in the biceps, triceps, brachioradialis, knee jerk and ankle jerk regions Toes are [employer]   Electronically signed by : Sherry Marte MD; Nov 7 2017  4:15PM EST                       (Author)

## 2017-11-22 ENCOUNTER — GENERIC CONVERSION - ENCOUNTER (OUTPATIENT)
Dept: OTHER | Facility: OTHER | Age: 42
End: 2017-11-22

## 2017-11-22 ENCOUNTER — ALLSCRIPTS OFFICE VISIT (OUTPATIENT)
Dept: OTHER | Facility: OTHER | Age: 42
End: 2017-11-22

## 2017-12-06 ENCOUNTER — HOSPITAL ENCOUNTER (OUTPATIENT)
Dept: RADIOLOGY | Facility: HOSPITAL | Age: 42
Discharge: HOME | End: 2017-12-06
Payer: COMMERCIAL

## 2017-12-06 ENCOUNTER — TRANSCRIBE ORDERS (OUTPATIENT)
Dept: ADMINISTRATIVE | Facility: HOSPITAL | Age: 42
End: 2017-12-06

## 2017-12-06 DIAGNOSIS — M79.673 PAIN OF FOOT: ICD-10-CM

## 2017-12-06 PROCEDURE — 73630 X-RAY EXAM OF FOOT: CPT

## 2017-12-13 ENCOUNTER — GENERIC CONVERSION - ENCOUNTER (OUTPATIENT)
Dept: OTHER | Facility: OTHER | Age: 42
End: 2017-12-13

## 2017-12-27 ENCOUNTER — GENERIC CONVERSION - ENCOUNTER (OUTPATIENT)
Dept: FAMILY MEDICINE CLINIC | Facility: CLINIC | Age: 42
End: 2017-12-27

## 2018-01-02 ENCOUNTER — GENERIC CONVERSION - ENCOUNTER (OUTPATIENT)
Dept: FAMILY MEDICINE CLINIC | Facility: CLINIC | Age: 43
End: 2018-01-02

## 2018-01-09 NOTE — MISCELLANEOUS
Date: 11/17/2016   Dear Brett Bourgeois:     We have attempted to reach you regarding your upcoming appointment on November 25, 2016 and with Cheri Marley     Unfortunately, due to a change in the provider's schedule we need to change your appointment  Please call our office as soon as possible so we can reschedule your appointment  We apologize for any inconvenience this may cause  Thank you in advance for your cooperation and assistance       Sincerely,   [address] [facility] Office Staff [facility]      Signatures   Electronically signed by : Manisha Cortez, ; Nov 17 2016  1:51PM EST                       (Author)

## 2018-01-11 NOTE — PROGRESS NOTES
Assessment    1  Rectal bleeding (569 3) (K62 5)   2  Chronic lumbar radiculopathy (724 4) (M54 16)   3  IBS (irritable bowel syndrome) (564 1) (K58 9)    Plan  Chronic lumbar radiculopathy    · 3 - Amada BENÍTEZ, Blayne Vega  (Neurosurgery) Physician Referral  Consult Only: the expectation is  that the referring provider will communicate back to the patient on treatment options  Evaluation and Treatment: the expectation is that the referred to provider will  communicate back to the patient on treatment options  Status: Hold For - Scheduling   Requested for: 47ZLE9542  are Referring to a non- Preferred Provider : Established Patient  Care Summary provided  : Yes  Rectal bleeding    · 1 - Nathaniel Valencia MD, Ganesh Agudelo (Gastroenterology) Physician Referral  Consult Only: the  expectation is that the referring provider will communicate back to the patient on  treatment options  Evaluation and Treatment: the expectation is that the referred to  provider will communicate back to the patient on treatment options  Status: Active   Requested for: 37AFU0274  Care Summary provided  : Yes    Discussion/Summary    Adjust diet to avoid constipation  keep hydrated  For now, no NSAIDS or ASA  Will set you up with Dr Nathaniel Valencia for a GI check  For any sudden change of symptoms - ER immed     Chief Complaint  Today noticed blood in stool    rectal bleeding      History of Present Illness  HPI: Noticed blood in stool this morning  BRB noted within the stool  not constipated, no diarrhea  no travel history, no fever, no vomiting  No f/o CC  Does c/o chronic IBS   Does have GERD and is on omep    Gastrointestinal Bleeding, Lower (Brief): Symptoms:  fatigue, weakness and lightheadedness  The patient is currently experiencing symptoms  Associated symptoms:  no fever, no chills, no nausea, no epistaxis, no hemoptysis, no rectal mass, no rectal itching and no rectal urgency  No associated symptoms are reported        Review of Systems    Constitutional: feeling poorly and feeling tired  ENT: no ear ache, no loss of hearing, no nosebleeds or nasal discharge, no sore throat or hoarseness  Cardiovascular: no complaints of slow or fast heart rate, no chest pain, no palpitations, no leg claudication or lower extremity edema  Respiratory: cough  Breasts: no complaints of breast pain, breast lump or nipple discharge  Gastrointestinal: bloody stools, but as noted in HPI  Genitourinary: no complaints of dysuria, no incontinence, no pelvic pain, no dysmenorrhea, no vaginal discharge or abnormal vaginal bleeding  Musculoskeletal: arthralgias, joint swelling and myalgias  Integumentary: no complaints of skin rash or lesion, no itching or dry skin, no skin wounds  Neurological: no complaints of headache, no confusion, no numbness or tingling, no dizziness or fainting  ROS reviewed  Active Problems    1  Arthralgia (719 40) (M25 50)   2  Bilateral impacted cerumen (380 4) (H61 23)   3  Chronic lumbar radiculopathy (724 4) (M54 16)   4  Chronic pain syndrome (338 4) (G89 4)   5  Depression (311) (F32 9)   6  Earache (388 70) (H92 09)   7  GERD without esophagitis (530 81) (K21 9)   8  Headache (784 0) (R51)   9  Insomnia, unspecified type (780 52) (G47 00)   10  Malaise and fatigue (780 79) (R53 81,R53 83)   11  Mid back pain (724 5) (M54 9)   12  Need for influenza vaccination (V04 81) (Z23)   13  Shift work sleep 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 36) (G47 26)    Past Medical History    1  History of Anxiety state (300 00) (F41 1)   2  History of Depressive disorder (311) (F32 9)   3  History of  2   4  History of Other symptoms involving skin and integumentary tissues (782 9) (R23 8)  Active Problems And Past Medical History Reviewed: The active problems and past medical history were reviewed and updated today  Family History  Mother    1  Family history of depression (V17 0) (Z81 8)   2  Denied: Family history of mental disorder   3   Denied: Family history of Illicit drug use  Grandparent    4  Family history of malignant neoplasm (V16 9) (Z80 9)  Maternal Grandmother    5  Family history of malignant neoplasm of breast (V16 3) (Z80 3)  Family History Reviewed: The family history was reviewed and updated today  Social History    · Daily caffeine consumption   · Denies alcohol consumption (V49 89) (Z78 9)   · Has 2 children   · Never a smoker   · No drug use   · Occupation   · Single  The social history was reviewed and updated today  The social history was reviewed and is unchanged  Surgical History    1  History of Cervical Loop Electrosurgical Excision (LEEP)   2  History of Hernia Repair  Surgical History Reviewed: The surgical history was reviewed and updated today  Current Meds   1  Amoxicillin 500 MG Oral Capsule; TAKE 1 CAPSULE 3 TIMES DAILY UNTIL GONE;   Therapy: 45KBK6170 to (Evaluate:17Jan2017)  Requested for: 90Osv6344; Last   Rx:40Dpb2958 Ordered   2  BusPIRone HCl - 30 MG Oral Tablet; Take 1/2 tablet by mouth 4 times daily; Therapy: (Recorded:48Wfj9918) to Recorded   3  FLUoxetine HCl - 90 MG Oral Capsule Delayed Release; TAKE 1 CAPSULE WEEKLY; Therapy: 54MXB6016 to (Last Rx:36Ind4077)  Requested for: 21Owy2690 Ordered   4  Methocarbamol 750 MG Oral Tablet; may take 2 pills tid prn back neck pain; Therapy: 45TMF2460 to (Evaluate:02Ybg5818)  Requested for: 46Ttb1955; Last   Rx:71Cnk5381 Ordered   5  Modafinil 200 MG Oral Tablet; TAKE 1 TABLET DAILY AS DIRECTED; Therapy: 65EZN2139 to (Evaluate:27Apr2017); Last Rx:09Six1183 Ordered   6  Morphine Sulfate 30 MG Oral Tablet; Therapy: (Recorded:95Bsi3451) to Recorded   7  Omeprazole 40 MG Oral Capsule Delayed Release; TAKE 1 CAPSULE DAILY  PRN; Therapy: 23Smy2603 to (Last Rx:61Xmk0035)  Requested for: 78Sdg5624 Ordered   8  Propranolol HCl - 60 MG Oral Tablet; takw one pill dialy;    Therapy: 85ETM9081 to 21 )  Requested for: 69QLC2440; Last   Rx:21Oct2016 Ordered   9  Super B Complex TABS; Therapy: (Recorded:24Avo4702) to Recorded   10  Topiramate 100 MG Oral Tablet; TAKE 1 TABLET TWICE DAILY; Therapy: 58LFI2177 to (Evaluate:58Bdc3640)  Requested for: 16Ljs0955; Last    Rx:04Tlk5521 Ordered    The medication list was reviewed and updated today  Allergies    1  No Known Drug Allergies    Vitals   Recorded: 15HXQ9312 11:06AM   Heart Rate 76   Respiration 12   Systolic 751, Sitting   Diastolic 72, Sitting   Height 5 ft 4 in   Weight 155 lb    BMI Calculated 26 61   BSA Calculated 1 76     Physical Exam    Constitutional   General appearance: No acute distress, well appearing and well nourished  Eyes   Conjunctiva and lids: No swelling, erythema or discharge  Pupils and irises: Equal, round and reactive to light  Ears, Nose, Mouth, and Throat   Otoscopic examination: Tympanic membranes translucent with normal light reflex  Canals patent without erythema  Nasal mucosa, septum, and turbinates: Normal without edema or erythema  Oropharynx: Normal with no erythema, edema, exudate or lesions  Pulmonary   Respiratory effort: No increased work of breathing or signs of respiratory distress  Auscultation of lungs: Clear to auscultation  Cardiovascular   Auscultation of heart: Normal rate and rhythm, normal S1 and S2, without murmurs  Examination of extremities for edema and/or varicosities: Normal     Carotid pulses: Normal     Abdomen   Abdomen: Non-tender, no masses  Liver and spleen: No hepatomegaly or splenomegaly  Musculoskeletal   Gait and station: Normal     Digits and nails: Normal without clubbing or cyanosis  Inspection/palpation of joints, bones, and muscles: Normal     Skin   Skin and subcutaneous tissue: Normal without rashes or lesions  Neurologic   Cranial nerves: Cranial nerves 2-12 intact  Reflexes: 2+ and symmetric  Sensation: No sensory loss      Psychiatric   Orientation to person, place, and time: Normal  Mood and affect: Normal          Attending Note  Collaborating Physician Note: Collaborating Note: I supervised the Advanced Practitioner and I agree with the Advanced Practitioner note        Future Appointments    Date/Time Provider Specialty Site   03/29/2017 10:00 AM Manju Zepeda, [facility], [facility]     Signatures   Electronically signed by : Cleophas Cogan, [address] [place] [address]; Jan 18 2017 11:08AM EST                       (Author)    Electronically signed by : Jacy Bacon DO; Jan 18 2017 11:11AM EST                       (Co-author)

## 2018-01-12 VITALS
DIASTOLIC BLOOD PRESSURE: 64 MMHG | HEIGHT: 64 IN | BODY MASS INDEX: 23.97 KG/M2 | OXYGEN SATURATION: 98 % | SYSTOLIC BLOOD PRESSURE: 100 MMHG | HEART RATE: 84 BPM | WEIGHT: 140.38 LBS

## 2018-01-13 VITALS
HEART RATE: 77 BPM | TEMPERATURE: 98.2 F | HEIGHT: 64 IN | RESPIRATION RATE: 15 BRPM | OXYGEN SATURATION: 97 % | WEIGHT: 148 LBS | BODY MASS INDEX: 25.27 KG/M2 | DIASTOLIC BLOOD PRESSURE: 70 MMHG | SYSTOLIC BLOOD PRESSURE: 102 MMHG

## 2018-01-13 VITALS
DIASTOLIC BLOOD PRESSURE: 80 MMHG | RESPIRATION RATE: 12 BRPM | OXYGEN SATURATION: 98 % | SYSTOLIC BLOOD PRESSURE: 104 MMHG | HEART RATE: 96 BPM | HEIGHT: 64 IN | BODY MASS INDEX: 23.95 KG/M2 | WEIGHT: 140.25 LBS | TEMPERATURE: 98.5 F

## 2018-01-13 VITALS
SYSTOLIC BLOOD PRESSURE: 118 MMHG | BODY MASS INDEX: 26.12 KG/M2 | DIASTOLIC BLOOD PRESSURE: 70 MMHG | HEIGHT: 64 IN | WEIGHT: 153 LBS | HEART RATE: 70 BPM

## 2018-01-13 VITALS
DIASTOLIC BLOOD PRESSURE: 60 MMHG | BODY MASS INDEX: 26.29 KG/M2 | TEMPERATURE: 98.6 F | WEIGHT: 154 LBS | SYSTOLIC BLOOD PRESSURE: 108 MMHG | HEIGHT: 64 IN

## 2018-01-13 VITALS
HEART RATE: 76 BPM | RESPIRATION RATE: 12 BRPM | HEIGHT: 64 IN | DIASTOLIC BLOOD PRESSURE: 72 MMHG | SYSTOLIC BLOOD PRESSURE: 118 MMHG | BODY MASS INDEX: 26.46 KG/M2 | WEIGHT: 155 LBS

## 2018-01-13 NOTE — RESULT NOTES
Message   Call patient and let her know her labs are back and look great     Verified Results  (1) LIPID PANEL, FASTING 64YRN7923 10:20AM [facility]     Test Name Result Flag Reference   CHOLESTEROL, TOTAL 183 mg/dL  125-200   HDL CHOLESTEROL 67 mg/dL  > OR = 46   TRIGLICERIDES 50 mg/dL  <657   LDL-CHOLESTEROL 106 mg/dL (calc)  <130   Desirable range <100 mg/dL for patients with CHD or  diabetes and <70 mg/dL for diabetic patients with  known heart disease  CHOL/HDLC RATIO 2 7 (calc)  < OR = 5 0   NON HDL CHOLESTEROL 116 mg/dL (calc)     Target for non-HDL cholesterol is 30 mg/dL higher than   LDL cholesterol target  (1) COMPREHENSIVE METABOLIC PANEL 18EYH9336 09:87TC [facility]     Test Name Result Flag Reference   GLUCOSE 81 mg/dL  65-99   Fasting reference interval   UREA NITROGEN (BUN) 15 mg/dL  7-25   CREATININE 0 89 mg/dL  0 50-1 10   eGFR NON-AFR   AMERICAN 82 mL/min/1 73m2  > OR = 60   eGFR AFRICAN AMERICAN 95 mL/min/1 73m2  > OR = 60   BUN/CREATININE RATIO   0-83   NOT APPLICABLE (calc)   SODIUM 136 mmol/L  135-146   POTASSIUM 4 0 mmol/L  3 5-5 3   CHLORIDE 105 mmol/L     CARBON DIOXIDE 23 mmol/L  20-31   CALCIUM 9 1 mg/dL  8 6-10 2   PROTEIN, TOTAL 6 9 g/dL  6 1-8 1   ALBUMIN 4 4 g/dL  3 6-5 1   GLOBULIN 2 5 g/dL (calc)  1 9-3 7   ALBUMIN/GLOBULIN RATIO 1 8 (calc)  1 0-2 5   BILIRUBIN, TOTAL 0 6 mg/dL  0 2-1 2   ALKALINE PHOSPHATASE 57 U/L     AST 15 U/L  10-30   ALT 16 U/L  6-29     (1) CBC/PLT/DIFF 99QUU3856 10:20AM [facility]     Test Name Result Flag Reference   WHITE BLOOD CELL COUNT 5 8 Thousand/uL  3 8-10 8   RED BLOOD CELL COUNT 4 80 Million/uL  3 80-5 10   HEMOGLOBIN 14 7 g/dL  11 7-15 5   HEMATOCRIT 44 1 %  35 0-45 0   MCV 91 8 fL  80 0-100 0   MCH 30 6 pg  27 0-33 0   MCHC 33 4 g/dL  32 0-36 0   RDW 12 7 %  11 0-15 0   PLATELET COUNT 115 Thousand/uL  140-400   MPV 8 6 fL  7 5-11 5   ABSOLUTE NEUTROPHILS 3538 cells/uL  8909-4659   ABSOLUTE LYMPHOCYTES 1815 cells/uL 850-3900   ABSOLUTE MONOCYTES 406 cells/uL  200-950   ABSOLUTE EOSINOPHILS 17 cells/uL     ABSOLUTE BASOPHILS 23 cells/uL  0-200   NEUTROPHILS 61 0 %     LYMPHOCYTES 31 3 %     MONOCYTES 7 0 %     EOSINOPHILS 0 3 %     BASOPHILS 0 4 %       (Q) HELICOBACTER PYLORI UREA BREATH TEST, INFRARED (UBIT) [id number] 10:20AM Nora Decker     Test Name Result Flag Reference   RESULT: NOT DETECTED  NOT DETECTED   Antimicrobials, proton pump inhibitors, and bismuth  preparations are known to suppress H  pylori, and   ingestion of these prior to H  pylori diagnostic testing  may lead to false negative results  If clinically   indicated, the test may be repeated on a new specimen  obtained two weeks after discontinuing treatment  (Q) TSH, 3RD GENERATION 89BPW1957 10:20AM Nora Decker   REPORT COMMENT:  FASTING:YES  COLLECTION REQUIREMENTS NOT MET  PATIENT ADVISED TO RETURN       Test Name Result Flag Reference   TSH 2 13 mIU/L     Reference Range                         > or = 20 Years  0 40-4 50                              Pregnancy Ranges            First trimester    0 26-2 66            Second trimester   0 55-2 73            Third trimester    0 43-2 91     (1) T4, FREE 95Pun4798 10:20AM Nora Decker     Test Name Result Flag Reference   T4, FREE 1 0 ng/dL  0 8-1 8

## 2018-01-13 NOTE — RESULT NOTES
Verified Results  (1) TISSUE EXAM [id number] 10:10AM Dara Cuff     Test Name Result Flag Reference   LAB AP CASE REPORT (Report)     Surgical Pathology Report             Case: [id number]                   Authorizing Provider: Jeanna Ortiz MD  Collected:      02/24/2017 1010        Ordering Location:   [address] [place] [address] Received:      02/24/2017 1403                    Operating Room                                 Pathologist:      Zack Khan MD                                 Specimens:  A) - Duodenum, Duodenum, cold bx r/o celiac                              B) - Stomach, Stomach, cold bx r/o H  pylori                              C) - Esophagus, Mid esophagus, cold bx r/o eosinophilic esophagitis   LAB AP FINAL DIAGNOSIS (Report)     A  Duodenum (biopsy):    - Small bowel mucosa with no significant pathologic abnormalities  - No villous atrophy, increased intraepithelial lymphocytes or crypt   hyperplasia to suggest     malabsorptive enteropathy     - No active inflammation, granulomas, organisms, dysplasia or neoplasia   identified  B  Stomach (biopsy):    - Gastric oxyntic and foveolar mucosa with no significant pathologic   abnormality     - Immunostain for H  pylori (with appropriate positive control) is   negative  - No intestinal metaplasia, dysplasia or neoplasia identified  C  Mid esophagus (biopsy):    - Squamous mucosa with no pathologic abnormality      - Eosinophils number less than 2 per HPF      - No dysplasia or neoplasia identified  Electronically signed by Zack Khan MD on 2/28/2017 at 11:03 AM   LAB AP NOTE      Interpretation performed at [facility], [address] [place] [address], [address] [place] [id number]  LAB AP SURGICAL ADDITIONAL INFORMATION (Report)     These tests were developed and their performance characteristics   determined by [facility] or [facility] [facility]  They may not be cleared or approved by the [place]   Food and   Drug Administration  The FDA has determined that such clearance or   approval is not necessary  These tests are used for clinical purposes  They should not be regarded as investigational or for research  This   laboratory has been approved by CLIA 88, designated as a high-complexity   laboratory and is qualified to perform these tests  LAB AP GROSS DESCRIPTION (Report)     A  The specimen is received in formalin, labeled with the patient's name   and hospital number, and is designated duodenum biopsy rule out celiac  The specimen consists of two, rubbery, tan-brown tissue fragments each   measuring up to 0 3 cm in greatest dimension  Entirely submitted  One   cassette  B  The specimen is received in formalin, labeled with the patient's name   and hospital number, and is designated stomach biopsy rule out H    pylori  The specimen consists of several, rubbery, tan-brown tissue   fragments measuring 0 8 x 0 8 x 0 2 cm in aggregate dimension  Entirely   submitted  One cassette  C  The specimen is received in formalin, labeled with the patient's name   and hospital number, and is designated mid esophagus biopsy rule out   eosinophilic esophagitis  The specimen consists of a single, rubbery,   tan-brown tissue fragment measuring up to 0 3 cm in greatest dimension  Entirely submitted  One cassette  Note: The estimated total formalin fixation time based upon information   provided by the submitting clinician and the standard processing schedule   is over 72 hours  SRS   LAB AP CLINICAL INFORMATION      GERD (gastroesophageal reflux disease)  ???  IBS (irritable bowel syndrome)     Cold bx r/o celiac

## 2018-01-14 VITALS
HEIGHT: 64 IN | WEIGHT: 146.8 LBS | OXYGEN SATURATION: 99 % | TEMPERATURE: 98.3 F | DIASTOLIC BLOOD PRESSURE: 62 MMHG | SYSTOLIC BLOOD PRESSURE: 116 MMHG | BODY MASS INDEX: 25.06 KG/M2 | HEART RATE: 79 BPM

## 2018-01-14 VITALS
SYSTOLIC BLOOD PRESSURE: 115 MMHG | WEIGHT: 155 LBS | HEIGHT: 64 IN | TEMPERATURE: 98.2 F | HEART RATE: 77 BPM | DIASTOLIC BLOOD PRESSURE: 82 MMHG | RESPIRATION RATE: 17 BRPM | BODY MASS INDEX: 26.46 KG/M2 | OXYGEN SATURATION: 98 %

## 2018-01-14 VITALS
WEIGHT: 144.13 LBS | HEIGHT: 64 IN | SYSTOLIC BLOOD PRESSURE: 110 MMHG | OXYGEN SATURATION: 98 % | RESPIRATION RATE: 14 BRPM | DIASTOLIC BLOOD PRESSURE: 70 MMHG | HEART RATE: 70 BPM | BODY MASS INDEX: 24.61 KG/M2

## 2018-01-15 VITALS
BODY MASS INDEX: 28.37 KG/M2 | HEART RATE: 72 BPM | WEIGHT: 160.13 LBS | HEIGHT: 63 IN | SYSTOLIC BLOOD PRESSURE: 110 MMHG | DIASTOLIC BLOOD PRESSURE: 76 MMHG

## 2018-01-17 ENCOUNTER — GENERIC CONVERSION - ENCOUNTER (OUTPATIENT)
Dept: FAMILY MEDICINE CLINIC | Facility: CLINIC | Age: 43
End: 2018-01-17

## 2018-01-22 VITALS
SYSTOLIC BLOOD PRESSURE: 112 MMHG | BODY MASS INDEX: 26.29 KG/M2 | TEMPERATURE: 98.5 F | HEIGHT: 64 IN | WEIGHT: 154 LBS | DIASTOLIC BLOOD PRESSURE: 72 MMHG

## 2018-01-22 VITALS
TEMPERATURE: 97.4 F | DIASTOLIC BLOOD PRESSURE: 74 MMHG | RESPIRATION RATE: 17 BRPM | OXYGEN SATURATION: 97 % | SYSTOLIC BLOOD PRESSURE: 118 MMHG | WEIGHT: 163.13 LBS | HEIGHT: 63 IN | BODY MASS INDEX: 28.9 KG/M2 | HEART RATE: 96 BPM

## 2018-01-22 VITALS
OXYGEN SATURATION: 97 % | TEMPERATURE: 98.6 F | HEART RATE: 81 BPM | BODY MASS INDEX: 26.32 KG/M2 | WEIGHT: 154.19 LBS | RESPIRATION RATE: 16 BRPM | SYSTOLIC BLOOD PRESSURE: 118 MMHG | DIASTOLIC BLOOD PRESSURE: 74 MMHG | HEIGHT: 64 IN

## 2018-01-22 VITALS
BODY MASS INDEX: 24.75 KG/M2 | RESPIRATION RATE: 16 BRPM | WEIGHT: 145 LBS | SYSTOLIC BLOOD PRESSURE: 115 MMHG | DIASTOLIC BLOOD PRESSURE: 76 MMHG | HEIGHT: 64 IN | HEART RATE: 90 BPM | TEMPERATURE: 98.6 F | OXYGEN SATURATION: 97 %

## 2018-01-23 NOTE — RESULT NOTES
Verified Results  * XR FOOT 3+ VIEW LEFT [id number] 02:50PM [facility] [facility] Order Number: [id number]     Test Name Result Flag Reference   XR FOOT 3+ VW LEFT (Report)     LEFT FOOT     INDICATION: Left foot pain  Injury  COMPARISON: None     VIEWS: 3     IMAGES: 3     FINDINGS:     There is no acute fracture or dislocation  There is mild calcification at the insertion of the Achilles tendon  No lytic or blastic lesions are seen  Soft tissues are unremarkable  IMPRESSION:     No acute osseous abnormality         Workstation performed: [id number]     Signed by:   Darvin Mcdaniel MD   12/7/17

## 2018-02-11 DIAGNOSIS — F34.1 DYSTHYMIA: Primary | ICD-10-CM

## 2018-02-12 ENCOUNTER — OFFICE VISIT (OUTPATIENT)
Dept: FAMILY MEDICINE CLINIC | Facility: CLINIC | Age: 43
End: 2018-02-12
Payer: COMMERCIAL

## 2018-02-12 VITALS
BODY MASS INDEX: 28.51 KG/M2 | DIASTOLIC BLOOD PRESSURE: 72 MMHG | WEIGHT: 167 LBS | SYSTOLIC BLOOD PRESSURE: 115 MMHG | HEART RATE: 76 BPM | OXYGEN SATURATION: 98 % | HEIGHT: 64 IN

## 2018-02-12 DIAGNOSIS — G47.26 SHIFT WORK SLEEP DISORDER: ICD-10-CM

## 2018-02-12 DIAGNOSIS — M25.475 SWELLING OF FOOT JOINT, LEFT: ICD-10-CM

## 2018-02-12 DIAGNOSIS — M79.672 FOOT PAIN, LEFT: Primary | ICD-10-CM

## 2018-02-12 PROBLEM — K40.90 LEFT INGUINAL HERNIA: Chronic | Status: RESOLVED | Noted: 2017-09-27 | Resolved: 2018-02-12

## 2018-02-12 PROCEDURE — 99214 OFFICE O/P EST MOD 30 MIN: CPT | Performed by: FAMILY MEDICINE

## 2018-02-12 RX ORDER — BUPROPION HYDROCHLORIDE 300 MG/1
TABLET ORAL
Qty: 90 TABLET | Refills: 3 | Status: SHIPPED | OUTPATIENT
Start: 2018-02-12 | End: 2018-11-28 | Stop reason: SDUPTHER

## 2018-02-12 RX ORDER — MODAFINIL 200 MG/1
200 TABLET ORAL DAILY
Qty: 30 TABLET | Refills: 3 | Status: SHIPPED | OUTPATIENT
Start: 2018-02-12 | End: 2018-05-09 | Stop reason: SDUPTHER

## 2018-02-12 NOTE — PROGRESS NOTES
Standard Visit   Assessment/Plan:     Visit Diagnosis:  Diagnoses and all orders for this visit:    Foot pain, left  -     MRI foot/forefoot toes left wo contrast; Future    Swelling of foot joint, left  -     MRI foot/forefoot toes left wo contrast; Future    Shift work sleep disorder  -     modafinil (PROVIGIL) 200 MG tablet; Take 1 tablet (200 mg total) by mouth daily          Subjective:    Patient ID: April M Angi Sutton is a 38 y o  female  Patient is here with the following concerns:    Here with complaint of left foot pain since November   she was chasing her son at home and twisted funny and injured it  We have gotten x-rays of this foot   Three months later the foot is still bothering her   X-ray shows only a little bit of calcium on the Achilles tendon   the remainder of the plain films were normal   April have the complaint of severe pain difficulty walking on it 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   and the foot is really giving her problems        The following portions of the patient's history were reviewed and updated as appropriate: allergies, current medications, past family history, past medical history, past social history, past surgical history and problem list     Review of Systems   Constitutional: Negative for activity change, fatigue, fever and unexpected weight change  HENT: Negative for congestion, ear pain, hearing loss, sinus pain, sore throat, tinnitus, trouble swallowing and voice change  Eyes: Negative for pain, discharge and visual disturbance  Respiratory: Negative for cough, chest tightness, shortness of breath and wheezing  Cardiovascular: Negative for chest pain, palpitations and leg swelling  Gastrointestinal: Negative for abdominal pain, blood in stool, diarrhea and vomiting  Endocrine: Negative for cold intolerance, heat intolerance and polyuria     Genitourinary: Negative for difficulty urinating, dysuria, flank pain, genital sores and urgency  Musculoskeletal: Positive for gait problem  Negative for joint swelling and neck stiffness  Difficulty walking due to left foot pain   Skin: Negative for color change, rash and wound  Allergic/Immunologic: Negative for immunocompromised state  Neurological: Negative for syncope, speech difficulty and light-headedness  Psychiatric/Behavioral: Negative for behavioral problems, dysphoric mood and suicidal ideas  /72   Pulse 76   Ht 5' 4" (1 626 m)   Wt 75 8 kg (167 lb)   SpO2 98%   BMI 28 67 kg/m²   Social History     Social History    Marital status: Single     Spouse name: N/A    Number of children: N/A    Years of education: N/A     Occupational History    Not on file  Social History Main Topics    Smoking status: Never Smoker    Smokeless tobacco: Never Used    Alcohol use No    Drug use: No    Sexual activity: Not on file     Other Topics Concern    Not on file     Social History Narrative    No narrative on file     Past Medical History:   Diagnosis Date    Anxiety     Arthritis     Chronic pain disorder     lumbar radiculopathy    Depression     GERD (gastroesophageal reflux disease)     Headache     Irritable bowel syndrome      No family history on file  Past Surgical History:   Procedure Laterality Date    CERVICAL BIOPSY  W/ LOOP ELECTRODE EXCISION      HERNIA REPAIR      HERNIA REPAIR Left 9/27/2017    Procedure: LAPAROSCOPIC INGUINAL HERNIA REPAIR WITH MESH;  Surgeon: Marly Benjamin MD;  [facility];  Service: General    [place] ESOPHAGOGASTRODUODENOSCOPY TRANSORAL DIAGNOSTIC N/A 2/24/2017    Procedure: EGD AND COLONOSCOPY;  Surgeon: Mark Tan MD;  Location: [facility];   Service: Gastroenterology       Current Outpatient Prescriptions:     acetaminophen-codeine (TYLENOL #3) 300-30 mg per tablet, TAKE ONE TABLET BY MOUTH EVERY 4 TO 6 HOURS AS NEEDED FOR PAIN, Disp: , Rfl: 0    ARIPiprazole (ABILIFY) 10 mg tablet, Take 1 tablet by mouth daily, Disp: , Rfl:     azithromycin (ZITHROMAX) 250 mg tablet, TAKE 2 TABLETS ON DAY 1 THEN TAKE 1 TABLET A DAY FOR 4 DAYS , Disp: , Rfl: 0    buPROPion (WELLBUTRIN XL) 300 mg 24 hr tablet, TAKE 1 TABLET BY MOUTH DAILY AS DIRECTED , Disp: 90 tablet, Rfl: 3    busPIRone (BUSPAR) 30 MG tablet, Take 30 mg by mouth 4 (four) times a day as needed  , Disp: , Rfl:     FLUoxetine (PROzac) 90 MG DR capsule, Take 90 mg by mouth every 7 days  , Disp: , Rfl:     Guaifenesin ([employer] MUCUS EXTENDED RELEASE) 1200 MG TB12, Take 1 tablet by mouth every 12 (twelve) hours as needed, Disp: , Rfl:     meloxicam (MOBIC) 7 5 mg tablet, Take 7 5 mg by mouth 2 (two) times a day, Disp: , Rfl: 3    methocarbamol (ROBAXIN) 750 mg tablet, Take 750 mg by mouth 3 (three) times a day  , Disp: , Rfl:     modafinil (PROVIGIL) 200 MG tablet, Take 1 tablet (200 mg total) by mouth daily, Disp: 30 tablet, Rfl: 3    morphine (MSIR) 30 MG tablet, Take 30 mg by mouth every 4 (four) hours as needed for severe pain  , Disp: , Rfl:     omeprazole (PriLOSEC) 40 MG capsule, Take 40 mg by mouth daily  , Disp: , Rfl:     predniSONE 20 mg tablet, TAKE 1 TABLET BY MOUTH 3 TIMES A DAY X 3 DAYS THEN 2 DAILY X 3 DAYS THEN 1 DAILY X 3 DAYS, Disp: , Rfl: 0    propranolol (INDERAL) 60 mg tablet, Take 60 mg by mouth daily  , Disp: , Rfl:     SUMAtriptan (IMITREX) 100 mg tablet, Take 1 tablet by mouth 2 (two) times a day as needed, Disp: , Rfl:     topiramate (TOPAMAX) 100 mg tablet, Take 1 tablet by mouth 2 (two) times a day, Disp: , Rfl:       Objective:     Physical Exam   Constitutional: She is oriented to person, place, and time  She appears well-developed and well-nourished  HENT:   Head: Normocephalic and atraumatic  Eyes: Pupils are equal, round, and reactive to light  Neck: Normal range of motion  Neck supple  Cardiovascular: Normal rate and normal heart sounds  No murmur heard    Pulmonary/Chest: Effort normal and breath sounds normal  No respiratory distress  She exhibits no tenderness  Abdominal: Soft  Bowel sounds are normal    Musculoskeletal: Normal range of motion  She exhibits edema ( swelling noted along the lateralSurface of the left  to touch)  She exhibits no tenderness  Lymphadenopathy:     She has no cervical adenopathy  Neurological: She is alert and oriented to person, place, and time  Coordination normal    Skin: Skin is warm and dry  Psychiatric: She has a normal mood and affect  Thought content normal    Nursing note and vitals reviewed  Social History     Social History    Marital status: Single     Spouse name: N/A    Number of children: N/A    Years of education: N/A     Occupational History    Not on file       Social History Main Topics    Smoking status: Never Smoker    Smokeless tobacco: Never Used    Alcohol use No    Drug use: No    Sexual activity: Not on file     Other Topics Concern    Not on file     Social History Narrative    No narrative on file     Past Medical History:   Diagnosis Date    Anxiety     Arthritis     Chronic pain disorder     lumbar radiculopathy    Depression     GERD (gastroesophageal reflux disease)     Headache     Irritable bowel syndrome        Current Outpatient Prescriptions:     acetaminophen-codeine (TYLENOL #3) 300-30 mg per tablet, TAKE ONE TABLET BY MOUTH EVERY 4 TO 6 HOURS AS NEEDED FOR PAIN, Disp: , Rfl: 0    ARIPiprazole (ABILIFY) 10 mg tablet, Take 1 tablet by mouth daily, Disp: , Rfl:     azithromycin (ZITHROMAX) 250 mg tablet, TAKE 2 TABLETS ON DAY 1 THEN TAKE 1 TABLET A DAY FOR 4 DAYS , Disp: , Rfl: 0    buPROPion (WELLBUTRIN XL) 300 mg 24 hr tablet, TAKE 1 TABLET BY MOUTH DAILY AS DIRECTED , Disp: 90 tablet, Rfl: 3    busPIRone (BUSPAR) 30 MG tablet, Take 30 mg by mouth 4 (four) times a day as needed  , Disp: , Rfl:     FLUoxetine (PROzac) 90 MG DR capsule, Take 90 mg by mouth every 7 days  , Disp: , Rfl:    Guaifenesin ([employer]S MUCUS EXTENDED RELEASE) 1200 MG TB12, Take 1 tablet by mouth every 12 (twelve) hours as needed, Disp: , Rfl:     meloxicam (MOBIC) 7 5 mg tablet, Take 7 5 mg by mouth 2 (two) times a day, Disp: , Rfl: 3    methocarbamol (ROBAXIN) 750 mg tablet, Take 750 mg by mouth 3 (three) times a day  , Disp: , Rfl:     modafinil (PROVIGIL) 200 MG tablet, Take 1 tablet (200 mg total) by mouth daily, Disp: 30 tablet, Rfl: 3    morphine (MSIR) 30 MG tablet, Take 30 mg by mouth every 4 (four) hours as needed for severe pain  , Disp: , Rfl:     omeprazole (PriLOSEC) 40 MG capsule, Take 40 mg by mouth daily  , Disp: , Rfl:     predniSONE 20 mg tablet, TAKE 1 TABLET BY MOUTH 3 TIMES A DAY X 3 DAYS THEN 2 DAILY X 3 DAYS THEN 1 DAILY X 3 DAYS, Disp: , Rfl: 0    propranolol (INDERAL) 60 mg tablet, Take 60 mg by mouth daily  , Disp: , Rfl:     SUMAtriptan (IMITREX) 100 mg tablet, Take 1 tablet by mouth 2 (two) times a day as needed, Disp: , Rfl:     topiramate (TOPAMAX) 100 mg tablet, Take 1 tablet by mouth 2 (two) times a day, Disp: , Rfl:   No family history on file  There are no Patient Instructions on file for this visit      Follow up here in [facility]

## 2018-02-12 NOTE — ASSESSMENT & PLAN NOTE
We will get an MRI of your left foot   At the end of the day I want you to elevate the foot and ice it    rotate the boots and shoes as best can   we will consider Dr Earnestine Dupree

## 2018-02-12 NOTE — ASSESSMENT & PLAN NOTE
We will order  Provigil-  reorder it   do not taking Provigil with anything ascitic like orange juice or eliminate it will keep it from working    Take it with the Tums and it will make it better absorbed

## 2018-02-12 NOTE — PATIENT INSTRUCTIONS
Your Provigil has been reordered make sure you take it with the Tums or just do not take with orange  Juice   for the foot were going to get an MRI   Ice the foot at the end of the day   Swab out issues as best he can  We are going to consider a Podiatry consult with Dr Mushtaq Schafer

## 2018-02-19 ENCOUNTER — HOSPITAL ENCOUNTER (OUTPATIENT)
Dept: MRI IMAGING | Facility: CLINIC | Age: 43
Discharge: HOME | End: 2018-02-19
Payer: COMMERCIAL

## 2018-02-19 DIAGNOSIS — M25.475 SWELLING OF FOOT JOINT, LEFT: ICD-10-CM

## 2018-02-19 DIAGNOSIS — M79.672 FOOT PAIN, LEFT: ICD-10-CM

## 2018-02-19 PROCEDURE — 73718 MRI LOWER EXTREMITY W/O DYE: CPT

## 2018-02-21 ENCOUNTER — TELEPHONE (OUTPATIENT)
Dept: FAMILY MEDICINE CLINIC | Facility: CLINIC | Age: 43
End: 2018-02-21

## 2018-02-22 ENCOUNTER — OFFICE VISIT (OUTPATIENT)
Dept: FAMILY MEDICINE CLINIC | Facility: CLINIC | Age: 43
End: 2018-02-22
Payer: COMMERCIAL

## 2018-02-22 VITALS
BODY MASS INDEX: 28.03 KG/M2 | HEART RATE: 78 BPM | WEIGHT: 164.2 LBS | HEIGHT: 64 IN | OXYGEN SATURATION: 95 % | DIASTOLIC BLOOD PRESSURE: 62 MMHG | SYSTOLIC BLOOD PRESSURE: 102 MMHG

## 2018-02-22 DIAGNOSIS — M65.9 TENOSYNOVITIS OF LEFT FOOT: ICD-10-CM

## 2018-02-22 DIAGNOSIS — S90.811D ABRASION OF RIGHT FOOT, SUBSEQUENT ENCOUNTER: Primary | ICD-10-CM

## 2018-02-22 PROBLEM — S90.811A ABRASION OF RIGHT FOOT: Status: RESOLVED | Noted: 2018-02-22 | Resolved: 2018-02-22

## 2018-02-22 PROBLEM — S90.811A ABRASION OF RIGHT FOOT: Status: ACTIVE | Noted: 2018-02-22

## 2018-02-22 PROBLEM — M65.972 TENOSYNOVITIS OF LEFT FOOT: Status: ACTIVE | Noted: 2018-02-22

## 2018-02-22 PROBLEM — M25.475 SWELLING OF FOOT JOINT, LEFT: Status: RESOLVED | Noted: 2018-02-12 | Resolved: 2018-02-22

## 2018-02-22 PROCEDURE — 99214 OFFICE O/P EST MOD 30 MIN: CPT | Performed by: FAMILY MEDICINE

## 2018-02-22 RX ORDER — PREDNISOLONE ACETATE 10 MG/ML
SUSPENSION/ DROPS OPHTHALMIC
Refills: 1 | COMMUNITY
Start: 2018-02-11 | End: 2018-05-09 | Stop reason: ALTCHOICE

## 2018-02-22 NOTE — ASSESSMENT & PLAN NOTE
We are at the point where we need a podiatrist consult  needs to stay off the foot as much as possible  Ice the foot  At the end of the day   you might possibly need an immobilizer or a boot to try to limit them back and forth in lateral movement of that foot to allow the tendons to heal but I am going to defer to the podiatrist to take a look at her MRI and decide the next course of treatment   you need to get the disc of your MRI and we will set up an appointment with Dr Marie Fees

## 2018-02-22 NOTE — PROGRESS NOTES
Standard Visit   Assessment/Plan:     Visit Diagnosis:   tenosynovitis left foot   We are at the point where we need a podiatrist consult  needs to stay off the foot as much as possible  Ice the foot  At the end of the day   you might possibly need an immobilizer or a boot to try to limit them back and forth in lateral movement of that foot to allow the tendons to heal but I am going to defer to the podiatrist to take a look at her MRI and decide the next course of treatment   you need to get the disc of your MRI and we will set up an appointment with Dr King Kim    Subjective:    Patient ID: April M Juvenal Johnson is a 38 y o  female  Patient is here with the following concerns:    Here to follow up MRI  Left foot   she injured her foot November 2017 it continues to hurt  X-rays were done  We recently did an MRI     There is mild acute plantar fasciitis (series 3 images 12 through 16 )     Mild posterior tibialis tenosynovitis without tear      Mild common peroneal tenosynovitis without tear            The following portions of the patient's history were reviewed and updated as appropriate: allergies, current medications, past family history, past medical history, past social history, past surgical history and problem list     Review of Systems   Constitutional: Negative for activity change, fatigue, fever and unexpected weight change  HENT: Negative for congestion, ear pain, hearing loss, sinus pain, sore throat, tinnitus, trouble swallowing and voice change  Eyes: Negative for pain, discharge and visual disturbance  Respiratory: Negative for cough, chest tightness, shortness of breath and wheezing  Cardiovascular: Negative for chest pain, palpitations and leg swelling  Gastrointestinal: Negative for abdominal pain, blood in stool, diarrhea and vomiting  Endocrine: Negative for cold intolerance, heat intolerance and polyuria     Genitourinary: Negative for difficulty urinating, dysuria, flank pain, genital sores and urgency  Musculoskeletal: Negative for gait problem, joint swelling and neck stiffness  Right foot pain   Skin: Negative for color change, rash and wound  Allergic/Immunologic: Negative for immunocompromised state  Neurological: Negative for syncope, speech difficulty and light-headedness  Psychiatric/Behavioral: Negative for behavioral problems, dysphoric mood and suicidal ideas  /62   Pulse 78   Ht 5' 4" (1 626 m)   Wt 74 5 kg (164 lb 3 2 oz)   SpO2 95%   BMI 28 18 kg/m²   Social History     Social History    Marital status: Single     Spouse name: N/A    Number of children: N/A    Years of education: N/A     Occupational History    Not on file  Social History Main Topics    Smoking status: Never Smoker    Smokeless tobacco: Never Used    Alcohol use No    Drug use: No    Sexual activity: Not on file     Other Topics Concern    Not on file     Social History Narrative    No narrative on file     Past Medical History:   Diagnosis Date    Anxiety     Arthritis     Chronic pain disorder     lumbar radiculopathy    Depression     GERD (gastroesophageal reflux disease)     Headache     Irritable bowel syndrome      No family history on file  Past Surgical History:   Procedure Laterality Date    CERVICAL BIOPSY  W/ LOOP ELECTRODE EXCISION      HERNIA REPAIR      HERNIA REPAIR Left 9/27/2017    Procedure: LAPAROSCOPIC INGUINAL HERNIA REPAIR WITH MESH;  Surgeon: Elena Mcconnell MD;  [facility];  Service: General    [place] ESOPHAGOGASTRODUODENOSCOPY TRANSORAL DIAGNOSTIC N/A 2/24/2017    Procedure: EGD AND COLONOSCOPY;  Surgeon: Lakshmi German MD;  [facility];   Service: Gastroenterology       Current Outpatient Prescriptions:     acetaminophen-codeine (TYLENOL #3) 300-30 mg per tablet, TAKE ONE TABLET BY MOUTH EVERY 4 TO 6 HOURS AS NEEDED FOR PAIN, Disp: , Rfl: 0    ARIPiprazole (ABILIFY) 10 mg tablet, Take 1 tablet by mouth daily, Disp: , Rfl:     azithromycin (ZITHROMAX) 250 mg tablet, TAKE 2 TABLETS ON DAY 1 THEN TAKE 1 TABLET A DAY FOR 4 DAYS , Disp: , Rfl: 0    buPROPion (WELLBUTRIN XL) 300 mg 24 hr tablet, TAKE 1 TABLET BY MOUTH DAILY AS DIRECTED , Disp: 90 tablet, Rfl: 3    busPIRone (BUSPAR) 30 MG tablet, Take 30 mg by mouth 4 (four) times a day as needed  , Disp: , Rfl:     FLUoxetine (PROzac) 90 MG DR capsule, Take 90 mg by mouth every 7 days  , Disp: , Rfl:     Guaifenesin ([employer]S MUCUS EXTENDED RELEASE) 1200 MG TB12, Take 1 tablet by mouth every 12 (twelve) hours as needed, Disp: , Rfl:     meloxicam (MOBIC) 7 5 mg tablet, Take 7 5 mg by mouth 2 (two) times a day, Disp: , Rfl: 3    methocarbamol (ROBAXIN) 750 mg tablet, Take 750 mg by mouth 3 (three) times a day  , Disp: , Rfl:     modafinil (PROVIGIL) 200 MG tablet, Take 1 tablet (200 mg total) by mouth daily, Disp: 30 tablet, Rfl: 3    morphine (MSIR) 30 MG tablet, Take 30 mg by mouth every 4 (four) hours as needed for severe pain  , Disp: , Rfl:     omeprazole (PriLOSEC) 40 MG capsule, Take 40 mg by mouth daily  , Disp: , Rfl:     prednisoLONE acetate (PRED FORTE) 1 % ophthalmic suspension, INSTILL ONE DROP INTO BOTH EYES 3 TIMES A DAY FOR 1 WEEK, Disp: , Rfl: 1    predniSONE 20 mg tablet, TAKE 1 TABLET BY MOUTH 3 TIMES A DAY X 3 DAYS THEN 2 DAILY X 3 DAYS THEN 1 DAILY X 3 DAYS, Disp: , Rfl: 0    propranolol (INDERAL) 60 mg tablet, Take 60 mg by mouth daily  , Disp: , Rfl:     SUMAtriptan (IMITREX) 100 mg tablet, Take 1 tablet by mouth 2 (two) times a day as needed, Disp: , Rfl:     topiramate (TOPAMAX) 100 mg tablet, Take 1 tablet by mouth 2 (two) times a day, Disp: , Rfl:       Objective:     Physical Exam   Constitutional: She is oriented to person, place, and time  She appears well-developed and well-nourished  HENT:   Head: Normocephalic and atraumatic  Eyes: Pupils are equal, round, and reactive to light  Neck: Normal range of motion   Neck supple  Cardiovascular: Normal rate and normal heart sounds  No murmur heard  Pulmonary/Chest: Effort normal and breath sounds normal  No respiratory distress  She exhibits no tenderness  Abdominal: Soft  Bowel sounds are normal    Musculoskeletal: Normal range of motion  She exhibits no tenderness  Tender lateral malleolus   Lymphadenopathy:     She has no cervical adenopathy  Neurological: She is alert and oriented to person, place, and time  Coordination normal    Skin: Skin is warm and dry  Psychiatric: She has a normal mood and affect  Thought content normal    Nursing note and vitals reviewed  Social History     Social History    Marital status: Single     Spouse name: N/A    Number of children: N/A    Years of education: N/A     Occupational History    Not on file       Social History Main Topics    Smoking status: Never Smoker    Smokeless tobacco: Never Used    Alcohol use No    Drug use: No    Sexual activity: Not on file     Other Topics Concern    Not on file     Social History Narrative    No narrative on file     Past Medical History:   Diagnosis Date    Anxiety     Arthritis     Chronic pain disorder     lumbar radiculopathy    Depression     GERD (gastroesophageal reflux disease)     Headache     Irritable bowel syndrome        Current Outpatient Prescriptions:     acetaminophen-codeine (TYLENOL #3) 300-30 mg per tablet, TAKE ONE TABLET BY MOUTH EVERY 4 TO 6 HOURS AS NEEDED FOR PAIN, Disp: , Rfl: 0    ARIPiprazole (ABILIFY) 10 mg tablet, Take 1 tablet by mouth daily, Disp: , Rfl:     azithromycin (ZITHROMAX) 250 mg tablet, TAKE 2 TABLETS ON DAY 1 THEN TAKE 1 TABLET A DAY FOR 4 DAYS , Disp: , Rfl: 0    buPROPion (WELLBUTRIN XL) 300 mg 24 hr tablet, TAKE 1 TABLET BY MOUTH DAILY AS DIRECTED , Disp: 90 tablet, Rfl: 3    busPIRone (BUSPAR) 30 MG tablet, Take 30 mg by mouth 4 (four) times a day as needed  , Disp: , Rfl:     FLUoxetine (PROzac) 90 MG DR capsule, Take 90 mg by mouth every 7 days  , Disp: , Rfl:     Guaifenesin ([employer] MUCUS EXTENDED RELEASE) 1200 MG TB12, Take 1 tablet by mouth every 12 (twelve) hours as needed, Disp: , Rfl:     meloxicam (MOBIC) 7 5 mg tablet, Take 7 5 mg by mouth 2 (two) times a day, Disp: , Rfl: 3    methocarbamol (ROBAXIN) 750 mg tablet, Take 750 mg by mouth 3 (three) times a day  , Disp: , Rfl:     modafinil (PROVIGIL) 200 MG tablet, Take 1 tablet (200 mg total) by mouth daily, Disp: 30 tablet, Rfl: 3    morphine (MSIR) 30 MG tablet, Take 30 mg by mouth every 4 (four) hours as needed for severe pain  , Disp: , Rfl:     omeprazole (PriLOSEC) 40 MG capsule, Take 40 mg by mouth daily  , Disp: , Rfl:     prednisoLONE acetate (PRED FORTE) 1 % ophthalmic suspension, INSTILL ONE DROP INTO BOTH EYES 3 TIMES A DAY FOR 1 WEEK, Disp: , Rfl: 1    predniSONE 20 mg tablet, TAKE 1 TABLET BY MOUTH 3 TIMES A DAY X 3 DAYS THEN 2 DAILY X 3 DAYS THEN 1 DAILY X 3 DAYS, Disp: , Rfl: 0    propranolol (INDERAL) 60 mg tablet, Take 60 mg by mouth daily  , Disp: , Rfl:     SUMAtriptan (IMITREX) 100 mg tablet, Take 1 tablet by mouth 2 (two) times a day as needed, Disp: , Rfl:     topiramate (TOPAMAX) 100 mg tablet, Take 1 tablet by mouth 2 (two) times a day, Disp: , Rfl:   No family history on file  There are no Patient Instructions on file for this visit          [facility]AM[facility]I Bass

## 2018-02-22 NOTE — PATIENT INSTRUCTIONS
We are at the point where we need a podiatrist consult  needs to stay off the foot as much as possible  Ice the foot  At the end of the day   you might possibly need an immobilizer or a boot to try to limit them back and forth in lateral movement of that foot to allow the tendons to heal but I am going to defer to the podiatrist to take a look at her MRI and decide the next course of treatment   you need to get the disc of your MRI and we will set up an appointment with Dr Cristel Calderón

## 2018-05-09 ENCOUNTER — OFFICE VISIT (OUTPATIENT)
Dept: FAMILY MEDICINE CLINIC | Facility: CLINIC | Age: 43
End: 2018-05-09
Payer: COMMERCIAL

## 2018-05-09 VITALS
BODY MASS INDEX: 26.46 KG/M2 | WEIGHT: 155 LBS | DIASTOLIC BLOOD PRESSURE: 74 MMHG | OXYGEN SATURATION: 98 % | HEART RATE: 79 BPM | SYSTOLIC BLOOD PRESSURE: 102 MMHG | HEIGHT: 64 IN

## 2018-05-09 DIAGNOSIS — M25.552 HIP PAIN, ACUTE, LEFT: ICD-10-CM

## 2018-05-09 DIAGNOSIS — Z01.818 PREOPERATIVE TESTING: ICD-10-CM

## 2018-05-09 DIAGNOSIS — K21.9 GASTROESOPHAGEAL REFLUX DISEASE WITHOUT ESOPHAGITIS: Primary | ICD-10-CM

## 2018-05-09 DIAGNOSIS — G47.26 SHIFT WORK SLEEP DISORDER: ICD-10-CM

## 2018-05-09 PROCEDURE — 99214 OFFICE O/P EST MOD 30 MIN: CPT | Performed by: FAMILY MEDICINE

## 2018-05-09 RX ORDER — ORPHENADRINE CITRATE 100 MG/1
100 TABLET, EXTENDED RELEASE ORAL 2 TIMES DAILY PRN
Refills: 2 | COMMUNITY
Start: 2018-04-24 | End: 2018-11-28

## 2018-05-09 RX ORDER — MODAFINIL 200 MG/1
200 TABLET ORAL DAILY
Qty: 90 TABLET | Refills: 1 | Status: SHIPPED | OUTPATIENT
Start: 2018-05-09 | End: 2018-08-22 | Stop reason: SDUPTHER

## 2018-05-09 RX ORDER — SUCRALFATE 1 G/1
1 TABLET ORAL 4 TIMES DAILY
Qty: 120 TABLET | Refills: 0 | Status: SHIPPED | OUTPATIENT
Start: 2018-05-09 | End: 2018-06-05 | Stop reason: SDUPTHER

## 2018-05-09 RX ORDER — LORAZEPAM 1 MG/1
TABLET ORAL
Qty: 10 TABLET | Refills: 0 | Status: SHIPPED | OUTPATIENT
Start: 2018-05-09 | End: 2018-08-23

## 2018-05-09 RX ORDER — PANTOPRAZOLE SODIUM 40 MG/1
40 TABLET, DELAYED RELEASE ORAL DAILY
Qty: 30 TABLET | Refills: 0 | Status: SHIPPED | OUTPATIENT
Start: 2018-05-09 | End: 2018-06-05 | Stop reason: SDUPTHER

## 2018-05-09 NOTE — PROGRESS NOTES
Standard Visit   Assessment/Plan:     Visit Diagnosis:  Diagnoses and all orders for this visit:    Gastroesophageal reflux disease without esophagitis  -     pantoprazole (PROTONIX) 40 mg tablet; Take 1 tablet (40 mg total) by mouth daily  -     sucralfate (CARAFATE) 1 g tablet; Take 1 tablet (1 g total) by mouth 4 (four) times a day for 30 days    Shift work sleep disorder  -     modafinil (PROVIGIL) 200 MG tablet; Take 1 tablet (200 mg total) by mouth daily    Hip pain, acute, left  -     XR hip/pelv 2-3 vws left if performed; Future    Preoperative testing  -     LORazepam (ATIVAN) 1 mg tablet; Take 2 tabs a 0 5 hour before getting into the MRI machine  Must have a     Other orders  -     orphenadrine (NORFLEX) 100 mg tablet; 100 mg 2 (two) times a day as needed     I have ordered the pantoprazole I want to take it faithfully for 1 month if this does not work I am going to have to sign you up for a EGD   with Carafate try to separate your other meds from the Carafate because if you take it right close together your other meds will not be absorbed   let Dr Lurdes Villegas the order your MRI   I have ordered a very small dose of Ativan to help you get into the MRI machine but you must have a  because the dose will make you a little sleepy   get the x-ray of your hip      Subjective:    Patient ID: Ana M Crow is a 38 y o  female       Patient is here with the following concerns:    Here with complaints of left hip pain  Started on Monday night As she was getting ready for bed  The pain started suddenly in her left hip and has continued until this point   At time she can barely walk on that leg  It definitely starts from the hip and goes down to the foot  She does not remember injuring it she does not remember hurting it or twisting her leg at all   She works for [employer]  She has been going to Dr Lurdes Villegas  the for her back she has been getting steroid injections and she also has had a Rhizotomy  in February              The following portions of the patient's history were reviewed and updated as appropriate: allergies, current medications, past family history, past medical history, past social history, past surgical history and problem list     Review of Systems   Constitutional: Negative for activity change, fatigue, fever and unexpected weight change  HENT: Negative for congestion, ear pain, hearing loss, sinus pain, sore throat, tinnitus, trouble swallowing and voice change  Eyes: Negative for pain, discharge and visual disturbance  Respiratory: Negative for cough, chest tightness, shortness of breath and wheezing  Cardiovascular: Negative for chest pain, palpitations and leg swelling  Gastrointestinal: Negative for abdominal pain, blood in stool, diarrhea and vomiting  Endocrine: Negative for cold intolerance, heat intolerance and polyuria  Genitourinary: Negative for difficulty urinating, dysuria, flank pain, genital sores and urgency  Musculoskeletal: Negative for gait problem, joint swelling and neck stiffness  Skin: Negative for color change, rash and wound  Allergic/Immunologic: Negative for immunocompromised state  Neurological: Negative for syncope, speech difficulty and light-headedness  Psychiatric/Behavioral: Negative for behavioral problems, dysphoric mood and suicidal ideas  /74   Pulse 79   Ht 5' 4" (1 626 m)   Wt 70 3 kg (155 lb)   SpO2 98%   BMI 26 61 kg/m²   Social History     Social History    Marital status: Single     Spouse name: N/A    Number of children: N/A    Years of education: N/A     Occupational History    Not on file       Social History Main Topics    Smoking status: Never Smoker    Smokeless tobacco: Never Used    Alcohol use No    Drug use: No    Sexual activity: Not on file     Other Topics Concern    Not on file     Social History Narrative    No narrative on file     Past Medical History:   Diagnosis Date    Anxiety  Arthritis     Chronic pain disorder     lumbar radiculopathy    Depression     GERD (gastroesophageal reflux disease)     Headache     Irritable bowel syndrome      No family history on file  Past Surgical History:   Procedure Laterality Date    CERVICAL BIOPSY  W/ LOOP ELECTRODE EXCISION      HERNIA REPAIR      HERNIA REPAIR Left 9/27/2017    Procedure: LAPAROSCOPIC INGUINAL HERNIA REPAIR WITH MESH;  Surgeon: Leo Amaya MD;  Location: [facility];  Service: General    NE ESOPHAGOGASTRODUODENOSCOPY TRANSORAL DIAGNOSTIC N/A 2/24/2017    Procedure: EGD AND COLONOSCOPY;  Surgeon: Polina Caballero MD;  Location: [facility]; Service: Gastroenterology       Current Outpatient Prescriptions:     buPROPion (WELLBUTRIN XL) 300 mg 24 hr tablet, TAKE 1 TABLET BY MOUTH DAILY AS DIRECTED , Disp: 90 tablet, Rfl: 3    modafinil (PROVIGIL) 200 MG tablet, Take 1 tablet (200 mg total) by mouth daily, Disp: 90 tablet, Rfl: 1    busPIRone (BUSPAR) 30 MG tablet, Take 30 mg by mouth 4 (four) times a day as needed  , Disp: , Rfl:     LORazepam (ATIVAN) 1 mg tablet, Take 2 tabs a 0 5 hour before getting into the MRI machine  Must have a , Disp: 10 tablet, Rfl: 0    orphenadrine (NORFLEX) 100 mg tablet, 100 mg 2 (two) times a day as needed, Disp: , Rfl: 2    pantoprazole (PROTONIX) 40 mg tablet, Take 1 tablet (40 mg total) by mouth daily, Disp: 30 tablet, Rfl: 0    sucralfate (CARAFATE) 1 g tablet, Take 1 tablet (1 g total) by mouth 4 (four) times a day for 30 days, Disp: 120 tablet, Rfl: 0    SUMAtriptan (IMITREX) 100 mg tablet, Take 1 tablet by mouth 2 (two) times a day as needed, Disp: , Rfl:       Objective:     Physical Exam   Constitutional: She is oriented to person, place, and time  She appears well-developed and well-nourished  HENT:   Head: Normocephalic and atraumatic  Eyes: Pupils are equal, round, and reactive to light  Neck: Normal range of motion  Neck supple     Cardiovascular: Normal rate and normal heart sounds  No murmur heard  Pulmonary/Chest: Breath sounds normal    Abdominal: Soft  Bowel sounds are normal    Musculoskeletal: Normal range of motion  She exhibits tenderness ( she does have some greater trochanter pain suggestive of greater trochanter bursitis on the left)  Range of motion of the left hip is limited   Lymphadenopathy:     She has no cervical adenopathy  Neurological: She is alert and oriented to person, place, and time  Skin: Skin is warm and dry  Nursing note and vitals reviewed  Social History     Social History    Marital status: Single     Spouse name: N/A    Number of children: N/A    Years of education: N/A     Occupational History    Not on file  Social History Main Topics    Smoking status: Never Smoker    Smokeless tobacco: Never Used    Alcohol use No    Drug use: No    Sexual activity: Not on file     Other Topics Concern    Not on file     Social History Narrative    No narrative on file     Past Medical History:   Diagnosis Date    Anxiety     Arthritis     Chronic pain disorder     lumbar radiculopathy    Depression     GERD (gastroesophageal reflux disease)     Headache     Irritable bowel syndrome        Current Outpatient Prescriptions:     buPROPion (WELLBUTRIN XL) 300 mg 24 hr tablet, TAKE 1 TABLET BY MOUTH DAILY AS DIRECTED , Disp: 90 tablet, Rfl: 3    modafinil (PROVIGIL) 200 MG tablet, Take 1 tablet (200 mg total) by mouth daily, Disp: 90 tablet, Rfl: 1    busPIRone (BUSPAR) 30 MG tablet, Take 30 mg by mouth 4 (four) times a day as needed  , Disp: , Rfl:     LORazepam (ATIVAN) 1 mg tablet, Take 2 tabs a 0 5 hour before getting into the MRI machine    Must have a , Disp: 10 tablet, Rfl: 0    orphenadrine (NORFLEX) 100 mg tablet, 100 mg 2 (two) times a day as needed, Disp: , Rfl: 2    pantoprazole (PROTONIX) 40 mg tablet, Take 1 tablet (40 mg total) by mouth daily, Disp: 30 tablet, Rfl: 0   sucralfate (CARAFATE) 1 g tablet, Take 1 tablet (1 g total) by mouth 4 (four) times a day for 30 days, Disp: 120 tablet, Rfl: 0    SUMAtriptan (IMITREX) 100 mg tablet, Take 1 tablet by mouth 2 (two) times a day as needed, Disp: , Rfl:   No family history on file      Patient Instructions      I have ordered the pantoprazole I want to take it faithfully for 1 month if this does not work I am going to have to sign you up for a EGD   with Carafate try to separate your other meds from the Carafate because if you take it right close together your other meds will not be absorbed   let Dr Anibal Witt the order your MRI   I have ordered a very small dose of Ativan to help you get into the MRI machine but you must have a  because the dose will make you a little sleepy   get the x-ray of your hip           I have ordered the pantoprazole I want to take it faithfully for 1 month if this does not work I am going to have to sign you up for a EGD   with Carafate try to separate your other meds from the Carafate because if you take it right close together your other meds will not be absorbed   let Dr Anibal Witt the order your MRI   I have ordered a very small dose of Ativan to help you get into the MRI machine but you must have a  because the dose will make you a little sleepy   get the x-ray of your hip          VAMSHI Barba

## 2018-05-09 NOTE — PATIENT INSTRUCTIONS
I have ordered the pantoprazole I want to take it faithfully for 1 month if this does not work I am going to have to sign you up for a EGD   with Carafate try to separate your other meds from the Carafate because if you take it right close together your other meds will not be absorbed   let Dr Jessica Rodriges the order your MRI   I have ordered a very small dose of Ativan to help you get into the MRI machine but you must have a  because the dose will make you a little sleepy   get the x-ray of your hip

## 2018-06-05 DIAGNOSIS — K21.9 GASTROESOPHAGEAL REFLUX DISEASE WITHOUT ESOPHAGITIS: ICD-10-CM

## 2018-06-06 RX ORDER — PANTOPRAZOLE SODIUM 40 MG/1
TABLET, DELAYED RELEASE ORAL
Qty: 30 TABLET | Refills: 0 | Status: SHIPPED | OUTPATIENT
Start: 2018-06-06 | End: 2018-08-23

## 2018-06-06 RX ORDER — SUCRALFATE 1 G/1
TABLET ORAL
Qty: 120 TABLET | Refills: 0 | Status: SHIPPED | OUTPATIENT
Start: 2018-06-06 | End: 2018-08-23

## 2018-06-21 ENCOUNTER — OFFICE VISIT (OUTPATIENT)
Dept: FAMILY MEDICINE CLINIC | Facility: CLINIC | Age: 43
End: 2018-06-21
Payer: COMMERCIAL

## 2018-06-21 VITALS
HEIGHT: 64 IN | HEART RATE: 88 BPM | BODY MASS INDEX: 25.27 KG/M2 | DIASTOLIC BLOOD PRESSURE: 78 MMHG | OXYGEN SATURATION: 99 % | SYSTOLIC BLOOD PRESSURE: 112 MMHG | WEIGHT: 148 LBS

## 2018-06-21 DIAGNOSIS — L24.7: Primary | ICD-10-CM

## 2018-06-21 PROCEDURE — 99213 OFFICE O/P EST LOW 20 MIN: CPT | Performed by: FAMILY MEDICINE

## 2018-06-21 RX ORDER — PREDNISONE 10 MG/1
TABLET ORAL
Qty: 30 TABLET | Refills: 0 | Status: SHIPPED | OUTPATIENT
Start: 2018-06-21 | End: 2018-08-23

## 2018-06-21 NOTE — PROGRESS NOTES
Assessment/Plan:       Diagnoses and all orders for this visit:    Irritant contact dermatitis due to Rhus wood  -     predniSONE 10 mg tablet; 5/5/4/4/3/3/2/2/1/1/ po qd        Discussed plan of care recommended in addition to the prednisone over-the-counter antihistamine Zyrtec recommended cleaning all  footwear      Subjective:      Patient ID: April M Gaby Conteh is a 41 y o  female  Rash , very itchy   All over , shoulders, forearms    began last weekend problem 5 days could have tried multiple over-the-counter products Benadryl calamine lotion, with little benefit, rash continues to rub it is very itchy negative fever chills the areas of nontender        The following portions of the patient's history were reviewed and updated as appropriate:   She has a past medical history of Anxiety; Arthritis; Chronic pain disorder; Depression; GERD (gastroesophageal reflux disease); Headache; and Irritable bowel syndrome  ,   does not have any pertinent problems on file  ,   has a past surgical history that includes Hernia repair; Cervical biopsy w/ loop electrode excision; pr esophagogastroduodenoscopy transoral diagnostic (N/A, 2/24/2017); and Hernia repair (Left, 9/27/2017)  ,  family history includes Breast cancer in her maternal grandmother; Cancer in her family; Depression in her mother  ,   reports that she has never smoked  She has never used smokeless tobacco  She reports that she does not drink alcohol or use drugs  ,  is allergic to tylenol cold severe congestion [[id number]]         Review of Systems   Constitutional: Negative for appetite change, chills, fever and unexpected weight change  HENT: Negative for congestion, dental problem, ear pain, hearing loss, postnasal drip, rhinorrhea, sinus pain, sinus pressure, sneezing, sore throat, tinnitus and voice change  Eyes: Negative for visual disturbance  Respiratory: Negative for apnea, cough, chest tightness and shortness of breath  Cardiovascular: Negative for chest pain, palpitations and leg swelling  Gastrointestinal: Negative for abdominal pain, blood in stool, constipation, diarrhea, nausea and vomiting  Endocrine: Negative for cold intolerance, heat intolerance, polydipsia, polyphagia and polyuria  Genitourinary: Negative for decreased urine volume, difficulty urinating, dysuria, frequency and hematuria  Musculoskeletal: Negative for arthralgias, back pain, gait problem, joint swelling and myalgias  Skin: Positive for rash  Negative for color change and wound  Allergic/Immunologic: Negative for environmental allergies and food allergies  Neurological: Negative for dizziness, syncope, weakness, light-headedness, numbness and headaches  Hematological: Negative for adenopathy  Does not bruise/bleed easily  Psychiatric/Behavioral: Negative for sleep disturbance and suicidal ideas  The patient is not nervous/anxious  All other systems reviewed and are negative  Objective:  Vitals:    06/21/18 1052   BP: 112/78   Pulse: 88   SpO2: 99%      Physical Exam   Constitutional: She is oriented to person, place, and time  She appears well-developed and well-nourished  No distress  HENT:   Head: Normocephalic and atraumatic  Neck: Normal range of motion  Cardiovascular: Normal rate  Pulmonary/Chest: Effort normal    Neurological: She is oriented to person, place, and time  Skin: Rash noted  Macular papular rash linear streaking nonvesicular puritic, bilateral shoulders bilateral forearms   Psychiatric: She has a normal mood and affect   Her behavior is normal  Judgment and thought content normal

## 2018-08-22 DIAGNOSIS — G47.26 SHIFT WORK SLEEP DISORDER: ICD-10-CM

## 2018-08-23 ENCOUNTER — OFFICE VISIT (OUTPATIENT)
Dept: FAMILY MEDICINE CLINIC | Facility: CLINIC | Age: 43
End: 2018-08-23
Payer: COMMERCIAL

## 2018-08-23 ENCOUNTER — TELEPHONE (OUTPATIENT)
Dept: PHYSICAL THERAPY | Facility: OTHER | Age: 43
End: 2018-08-23

## 2018-08-23 ENCOUNTER — HOSPITAL ENCOUNTER (OUTPATIENT)
Dept: RADIOLOGY | Facility: HOSPITAL | Age: 43
Discharge: HOME | End: 2018-08-23
Payer: COMMERCIAL

## 2018-08-23 VITALS
OXYGEN SATURATION: 98 % | RESPIRATION RATE: 16 BRPM | WEIGHT: 152.4 LBS | SYSTOLIC BLOOD PRESSURE: 100 MMHG | HEIGHT: 64 IN | DIASTOLIC BLOOD PRESSURE: 70 MMHG | HEART RATE: 82 BPM | TEMPERATURE: 98.3 F | BODY MASS INDEX: 26.02 KG/M2

## 2018-08-23 DIAGNOSIS — M25.552 HIP PAIN, ACUTE, LEFT: Primary | ICD-10-CM

## 2018-08-23 DIAGNOSIS — F41.8 DEPRESSION WITH ANXIETY: ICD-10-CM

## 2018-08-23 DIAGNOSIS — G89.29 CHRONIC LOW BACK PAIN WITHOUT SCIATICA, UNSPECIFIED BACK PAIN LATERALITY: ICD-10-CM

## 2018-08-23 DIAGNOSIS — M54.50 CHRONIC LOW BACK PAIN WITHOUT SCIATICA, UNSPECIFIED BACK PAIN LATERALITY: ICD-10-CM

## 2018-08-23 DIAGNOSIS — M25.552 HIP PAIN, ACUTE, LEFT: ICD-10-CM

## 2018-08-23 DIAGNOSIS — K21.9 GASTROESOPHAGEAL REFLUX DISEASE WITHOUT ESOPHAGITIS: ICD-10-CM

## 2018-08-23 PROCEDURE — 3008F BODY MASS INDEX DOCD: CPT | Performed by: FAMILY MEDICINE

## 2018-08-23 PROCEDURE — 73502 X-RAY EXAM HIP UNI 2-3 VIEWS: CPT

## 2018-08-23 PROCEDURE — 99214 OFFICE O/P EST MOD 30 MIN: CPT | Performed by: FAMILY MEDICINE

## 2018-08-23 RX ORDER — PANTOPRAZOLE SODIUM 40 MG/1
TABLET, DELAYED RELEASE ORAL
Qty: 60 TABLET | Refills: 3 | Status: SHIPPED | OUTPATIENT
Start: 2018-08-23 | End: 2018-10-17 | Stop reason: ALTCHOICE

## 2018-08-23 NOTE — TELEPHONE ENCOUNTER
Attempted to reach patient, patient unavailable  Left voicemail message with callback number [phone]

## 2018-08-23 NOTE — PATIENT INSTRUCTIONS
Will get x-ray of the left hip  Also discussed with patient I would like her to give a trial off Provigil to see how her day goes to see if she still needs it and if it helps her reflux  In the meantime will start pantoprazole twice daily in the morning and before bed with a full glass of water  [facility] her persistent back pain as she has already tried epidural steroids, rhizotomy, and physical therapy  Follow-up here in 2 weeks will discuss all at that time and consider a change from Wellbutrin to a different antidepressant or adding an antidepressant

## 2018-08-23 NOTE — PROGRESS NOTES
Assessment/Plan:     Chronic Problems:  Shift work sleep disorder    Suggest a trial off provigil  Visit Diagnosis:  Diagnoses and all orders for this visit:    Hip pain, acute, left  Comments: Will get an x-ray of the left hip  Orders:  -     XR hip/pelv 2-3 vws left if performed; Future    Gastroesophageal reflux disease without esophagitis  Comments: Will start PPI b i d  and follow in 2 weeks  May need a repeat EGD if persistent reflux  Orders:  -     pantoprazole (PROTONIX) 40 mg tablet; Take 1 pill bid with water    Depression with anxiety  Comments:    May need to change from the current Wellbutrin  Chronic low back pain without sciatica, unspecified back pain laterality  -     [facility] Referral to [facility]; Future          Subjective:    Patient ID: April M April Baeza is a 41 y o  female  Pt is here for routine f/u appt  Needs her provigil renewed  Pt works early [employer]  Has been on provigil for several years  No side effects from meds  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  Feels the provigil helps her in the ams  Gets a boost of energy to get her started with her day  No daytime sleepiness  Takes all other meds as directed  No side effects noted  Also having bad acid reflux and was on pantoprazole and sucralfate, but they weren't working so she stopped it  Has had acid reflux and stomach issues for years  Previous egd and colonoscopy were normal per pt  The following portions of the patient's history were reviewed and updated as appropriate: allergies, current medications, past family history, past medical history, past social history, past surgical history and problem list     Review of Systems   Constitutional: Negative for chills, diaphoresis, fatigue and fever  HENT: Positive for sore throat (from acid reflux)  Negative for congestion, ear pain, postnasal drip and rhinorrhea  Eyes: Negative      Respiratory: Negative for cough, choking (but has burning  ), shortness of breath and wheezing  Cardiovascular: Negative for chest pain and palpitations  Gastrointestinal: Positive for abdominal distention and abdominal pain (also having pain at the site of previous inguinal hernias)  Negative for constipation, diarrhea, nausea and vomiting  Patient states the acid reflux started last year prior to the EGD in February  Has been on her meds for years  Genitourinary: Negative  Musculoskeletal: Positive for arthralgias (some pains in the left leg that shoot from left hip down to the left shin  No new injury but has 2 herniated discs for last 30 years  )  Patient has a history of chronic back problems  Has had epidural steroids and rhizotomy in the past by Dr Lindsay Campa  Neurological: Positive for headaches  Negative for dizziness, light-headedness and numbness  Psychiatric/Behavioral: Positive for dysphoric mood and sleep disturbance (awakens with anxiety)  The patient is nervous/anxious  Not sure if the meds are helping anymore            /70   Pulse 82   Temp 98 3 °F (36 8 °C)   Resp 16   Ht 5' 4" (1 626 m)   Wt 69 1 kg (152 lb 6 4 oz)   LMP 07/18/2018   SpO2 98%   BMI 26 16 kg/m²   Social History     Social History    Marital status: Single     Spouse name: N/A    Number of children: 2    Years of education: N/A     Occupational History    [employer]      Social History Main Topics    Smoking status: Never Smoker    Smokeless tobacco: Never Used    Alcohol use No    Drug use: No    Sexual activity: Not on file     Other Topics Concern    Not on file     Social History Narrative    Daily coffee consumption         Past Medical History:   Diagnosis Date    Anxiety     Arthritis     Chronic pain disorder     lumbar radiculopathy    Depression     GERD (gastroesophageal reflux disease)     Headache     Irritable bowel syndrome      Family History   Problem Relation Age of Onset    Depression Mother     Breast cancer Maternal Grandmother     Cancer Family      Past Surgical History:   Procedure Laterality Date    CERVICAL BIOPSY  W/ LOOP ELECTRODE EXCISION      HERNIA REPAIR      HERNIA REPAIR Left 9/27/2017    Procedure: LAPAROSCOPIC INGUINAL HERNIA REPAIR WITH MESH;  Surgeon: Tessy Roper MD;  [facility]: [facility];  Service: General    [place] ESOPHAGOGASTRODUODENOSCOPY TRANSORAL DIAGNOSTIC N/A 2/24/2017    Procedure: EGD AND COLONOSCOPY;  Surgeon: Indar Valencia MD;  [facility]; Service: Gastroenterology       Current Outpatient Prescriptions:     buPROPion (WELLBUTRIN XL) 300 mg 24 hr tablet, TAKE 1 TABLET BY MOUTH DAILY AS DIRECTED , Disp: 90 tablet, Rfl: 3    busPIRone (BUSPAR) 30 MG tablet, Take 30 mg by mouth 4 (four) times a day as needed  , Disp: , Rfl:     modafinil (PROVIGIL) 200 MG tablet, Take 1 tablet (200 mg total) by mouth daily, Disp: 90 tablet, Rfl: 1    orphenadrine (NORFLEX) 100 mg tablet, 100 mg 2 (two) times a day as needed, Disp: , Rfl: 2    pantoprazole (PROTONIX) 40 mg tablet, Take 1 pill bid with water, Disp: 60 tablet, Rfl: 3    Allergies   Allergen Reactions    Tylenol Cold Severe Congestion [Pseudoephedrine-[id number]]           Lab Review   No visits with results within 2 Month(s) from this visit  Latest known visit with results is:   Admission on 09/27/2017, Discharged on 09/27/2017   Component Date Value    EXT Preg Test, Ur 09/27/2017 Negative         Imaging: No results found  Objective:     Physical Exam   Constitutional: She is oriented to person, place, and time  She appears well-developed and well-nourished  No distress  HENT:   Head: Normocephalic and atraumatic  Right Ear: External ear normal    Left Ear: External ear normal    Mouth/Throat: Oropharynx is clear and moist    Eyes: Conjunctivae and EOM are normal  Pupils are equal, round, and reactive to light  Right eye exhibits no discharge   Left eye exhibits no discharge  Neck: Normal range of motion  Neck supple  No thyromegaly present  Cardiovascular: Normal rate, regular rhythm and normal heart sounds  Exam reveals no friction rub  No murmur heard  Pulmonary/Chest: Effort normal and breath sounds normal  No respiratory distress  She has no wheezes  She has no rales  She exhibits no tenderness  Abdominal: Soft  Bowel sounds are normal  There is tenderness (to bilateral lower quadrants over sight of previous hernia repairs  )  Musculoskeletal: Normal range of motion  She exhibits tenderness (over the left hip with painful abduction  )  She exhibits no edema or deformity  Lymphadenopathy:     She has no cervical adenopathy  Neurological: She is alert and oriented to person, place, and time  No cranial nerve deficit  Skin: Skin is warm and dry  No rash noted  She is not diaphoretic  Psychiatric: She has a normal mood and affect  Her behavior is normal  Judgment and thought content normal          Patient Instructions     Will get x-ray of the left hip  Also discussed with patient I would like her to give a trial off Provigil to see how her day goes to see if she still needs it and if it helps her reflux  In the meantime will start pantoprazole twice daily in the morning and before bed with a full glass of water  [facility] for her persistent back pain as she has already tried epidural steroids, rhizotomy, and physical therapy  Follow-up here in 2 weeks will discuss all at that time and consider a change from Wellbutrin to a different antidepressant or adding an antidepressant         VAMSHI Torres

## 2018-08-24 ENCOUNTER — TELEPHONE (OUTPATIENT)
Dept: PHYSICAL THERAPY | Facility: OTHER | Age: 43
End: 2018-08-24

## 2018-08-24 RX ORDER — MODAFINIL 200 MG/1
200 TABLET ORAL DAILY
Qty: 90 TABLET | Refills: 0 | Status: SHIPPED | OUTPATIENT
Start: 2018-08-24 | End: 2018-10-10 | Stop reason: SDUPTHER

## 2018-08-27 ENCOUNTER — TELEPHONE (OUTPATIENT)
Dept: PHYSICAL THERAPY | Facility: OTHER | Age: 43
End: 2018-08-27

## 2018-08-27 NOTE — TELEPHONE ENCOUNTER
Third attempt to reach patient, patient unavailable  Left voicemail message with callback number  Referral to be closed

## 2018-10-10 DIAGNOSIS — G47.26 SHIFT WORK SLEEP DISORDER: ICD-10-CM

## 2018-10-11 RX ORDER — MODAFINIL 200 MG/1
200 TABLET ORAL DAILY
Qty: 90 TABLET | Refills: 0 | Status: SHIPPED | OUTPATIENT
Start: 2018-10-11 | End: 2018-11-28 | Stop reason: SDUPTHER

## 2018-10-17 ENCOUNTER — OFFICE VISIT (OUTPATIENT)
Dept: FAMILY MEDICINE CLINIC | Facility: CLINIC | Age: 43
End: 2018-10-17
Payer: COMMERCIAL

## 2018-10-17 VITALS
DIASTOLIC BLOOD PRESSURE: 72 MMHG | HEIGHT: 64 IN | OXYGEN SATURATION: 97 % | BODY MASS INDEX: 26.22 KG/M2 | HEART RATE: 82 BPM | WEIGHT: 153.6 LBS | SYSTOLIC BLOOD PRESSURE: 112 MMHG

## 2018-10-17 DIAGNOSIS — R41.840 ATTENTION AND CONCENTRATION DEFICIT: ICD-10-CM

## 2018-10-17 DIAGNOSIS — G47.26 SHIFT WORK SLEEP DISORDER: ICD-10-CM

## 2018-10-17 DIAGNOSIS — K21.9 GASTROESOPHAGEAL REFLUX DISEASE WITHOUT ESOPHAGITIS: Primary | ICD-10-CM

## 2018-10-17 PROCEDURE — 99214 OFFICE O/P EST MOD 30 MIN: CPT | Performed by: FAMILY MEDICINE

## 2018-10-17 RX ORDER — DEXLANSOPRAZOLE 60 MG/1
60 CAPSULE, DELAYED RELEASE ORAL DAILY
Qty: 30 CAPSULE | Refills: 3 | Status: SHIPPED | OUTPATIENT
Start: 2018-10-17 | End: 2018-11-28 | Stop reason: SDUPTHER

## 2018-10-17 RX ORDER — ATOMOXETINE 40 MG/1
40 CAPSULE ORAL DAILY
Qty: 30 CAPSULE | Refills: 3 | Status: SHIPPED | OUTPATIENT
Start: 2018-10-17 | End: 2018-11-28 | Stop reason: SDUPTHER

## 2018-10-17 RX ORDER — SUCRALFATE 1 G/1
TABLET ORAL
Qty: 30 TABLET | Refills: 0 | Status: SHIPPED | OUTPATIENT
Start: 2018-10-17 | End: 2018-11-28 | Stop reason: ALTCHOICE

## 2018-10-17 NOTE — PATIENT INSTRUCTIONS
Stop the Protonix  We are going to try Dexilant and Carafate  Take the Carafate at night being aware that it will decrease the  The absorption of other meds   As far as the attention issues we are going to try Strattera   it can make you little nauseated the beginning just let me know if it is intolerable   my best advice is to try and get some sleep and rest   Good body mechanics try not to lift heavy objects without lifting with your knees   wear a core support   good supportive shoes  Note for work that she is allowed to wear [employer]  because of her plantar fasciitis

## 2018-10-17 NOTE — PROGRESS NOTES
Standard Visit   Assessment/Plan:     Visit Diagnosis:  Diagnoses and all orders for this visit:    Gastroesophageal reflux disease without esophagitis  -     dexlansoprazole (DEXILANT) 60 MG capsule; Take 1 capsule (60 mg total) by mouth daily for 30 days  -     sucralfate (CARAFATE) 1 g tablet; 1 pill at bedtime  daily    Shift work sleep disorder    Attention and concentration deficit  -     atoMOXetine (STRATTERA) 40 mg capsule; Take 1 capsule (40 mg total) by mouth daily for 30 days    Stop the Protonix  We are going to try Dexilant and Carafate  Take the Carafate at night being aware that it will decrease the  The absorption of other meds   As far as the attention issues we are going to try Strattera   it can make you little nauseated the beginning just let me know if it is intolerable   my best advice is to try and get some sleep and rest   Good body mechanics try not to lift heavy objects without lifting with your knees   wear a core support   good supportive shoes  Note for work that she is allowed to wear [employer]ics  because of her plantar fasciitis          Subjective:    Patient ID: Ana M Johnson is a 41 y o  female       Patient is here with the following concerns:    Here to go over medications  And also has several issues  At this point she is taking Wellbutrin and her Provigil   She is also taking Protonix however her GERD continues to be a problem  Her last EGD was done by Dr Jackelyn Xiao in 2017   But she has problems with GERD all the time   She can have a at any time of the day   She works swing shift   She works all different kinds of hours   She  Describes her GERD symptoms as a sudden onset of a burning that occurs in the back of her throat out of no where   also the pain that she has in her left leg left anterior leg that comes from her left hip and travels down the front of her left leg continues   starts at they left hip and continues down the front and ends below her left knee   we have x-rayed that left hip-  She says this pain has been going on for over a year   She has also seen Dr Tiara Rasmussen  for her low back pain  She has had a Rhiizotomy   The following portions of the patient's history were reviewed and updated as appropriate: allergies, current medications, past family history, past medical history, past social history, past surgical history and problem list     Review of Systems   Constitutional: Positive for activity change and fatigue  Respiratory: Negative  Gastrointestinal:        GERD and reflux   Musculoskeletal: Positive for arthralgias          Complains of low back pain   left foot pain  Left hip pain   left leg pain   right hernia pain-  The right hernia that she had repaired 4 years ago           /72   Pulse 82   Ht 5' 4" (1 626 m)   Wt 69 7 kg (153 lb 9 6 oz)   SpO2 97%   BMI 26 37 kg/m²   Social History     Social History    Marital status: Single     Spouse name: N/A    Number of children: 2    Years of education: N/A     Occupational History    [employer]      Social History Main Topics    Smoking status: Never Smoker    Smokeless tobacco: Never Used    Alcohol use No    Drug use: No    Sexual activity: Not on file     Other Topics Concern    Not on file     Social History Narrative    Daily coffee consumption         Past Medical History:   Diagnosis Date    Anxiety     Arthritis     Chronic pain disorder     lumbar radiculopathy    Depression     GERD (gastroesophageal reflux disease)     Headache     Irritable bowel syndrome      Family History   Problem Relation Age of Onset    Depression Mother     Breast cancer Maternal Grandmother     Cancer Family      Past Surgical History:   Procedure Laterality Date    CERVICAL BIOPSY  W/ LOOP ELECTRODE EXCISION      HERNIA REPAIR      HERNIA REPAIR Left 9/27/2017    Procedure: LAPAROSCOPIC INGUINAL HERNIA REPAIR WITH MESH;  Surgeon: Maddy Mosquera MD;  [facility]: [facility];  Service: General  IN ESOPHAGOGASTRODUODENOSCOPY TRANSORAL DIAGNOSTIC N/A 2/24/2017    Procedure: EGD AND COLONOSCOPY;  Surgeon: Enrique Omalley MD;  Location: [facility]; Service: Gastroenterology       Current Outpatient Prescriptions:     atoMOXetine (STRATTERA) 40 mg capsule, Take 1 capsule (40 mg total) by mouth daily for 30 days, Disp: 30 capsule, Rfl: 3    buPROPion (WELLBUTRIN XL) 300 mg 24 hr tablet, TAKE 1 TABLET BY MOUTH DAILY AS DIRECTED , Disp: 90 tablet, Rfl: 3    busPIRone (BUSPAR) 30 MG tablet, Take 30 mg by mouth 4 (four) times a day as needed  , Disp: , Rfl:     dexlansoprazole (DEXILANT) 60 MG capsule, Take 1 capsule (60 mg total) by mouth daily for 30 days, Disp: 30 capsule, Rfl: 3    modafinil (PROVIGIL) 200 MG tablet, Take 1 tablet (200 mg total) by mouth daily, Disp: 90 tablet, Rfl: 0    orphenadrine (NORFLEX) 100 mg tablet, 100 mg 2 (two) times a day as needed, Disp: , Rfl: 2    sucralfate (CARAFATE) 1 g tablet, 1 pill at bedtime  daily, Disp: 30 tablet, Rfl: 0      Objective:     Physical Exam   Constitutional: She is oriented to person, place, and time  She appears well-developed and well-nourished  HENT:   Head: Normocephalic and atraumatic  Eyes: Pupils are equal, round, and reactive to light  Neck: Normal range of motion  Neck supple  Cardiovascular: Normal rate  Pulmonary/Chest: Effort normal and breath sounds normal    Abdominal: Soft  Musculoskeletal: Normal range of motion  Neurological: She is alert and oriented to person, place, and time  Skin: Skin is warm and dry  Psychiatric: She has a normal mood and affect  Nursing note and vitals reviewed        Social History     Social History    Marital status: Single     Spouse name: N/A    Number of children: 2    Years of education: N/A     Occupational History    [employer]      Social History Main Topics    Smoking status: Never Smoker    Smokeless tobacco: Never Used    Alcohol use No    Drug use: No    Sexual activity: Not on file     Other Topics Concern    Not on file     Social History Narrative    Daily coffee consumption         Past Medical History:   Diagnosis Date    Anxiety     Arthritis     Chronic pain disorder     lumbar radiculopathy    Depression     GERD (gastroesophageal reflux disease)     Headache     Irritable bowel syndrome        Current Outpatient Prescriptions:     atoMOXetine (STRATTERA) 40 mg capsule, Take 1 capsule (40 mg total) by mouth daily for 30 days, Disp: 30 capsule, Rfl: 3    buPROPion (WELLBUTRIN XL) 300 mg 24 hr tablet, TAKE 1 TABLET BY MOUTH DAILY AS DIRECTED , Disp: 90 tablet, Rfl: 3    busPIRone (BUSPAR) 30 MG tablet, Take 30 mg by mouth 4 (four) times a day as needed  , Disp: , Rfl:     dexlansoprazole (DEXILANT) 60 MG capsule, Take 1 capsule (60 mg total) by mouth daily for 30 days, Disp: 30 capsule, Rfl: 3    modafinil (PROVIGIL) 200 MG tablet, Take 1 tablet (200 mg total) by mouth daily, Disp: 90 tablet, Rfl: 0    orphenadrine (NORFLEX) 100 mg tablet, 100 mg 2 (two) times a day as needed, Disp: , Rfl: 2    sucralfate (CARAFATE) 1 g tablet, 1 pill at bedtime  daily, Disp: 30 tablet, Rfl: 0  Family History   Problem Relation Age of Onset    Depression Mother     Breast cancer Maternal Grandmother     Cancer Family        Patient Instructions    Stop the Protonix  We are going to try Dexilant and Carafate  Take the Carafate at night being aware that it will decrease the  The absorption of other meds   As far as the attention issues we are going to try Strattera   it can make you little nauseated the beginning just let me know if it is intolerable   my best advice is to try and get some sleep and rest   Good body mechanics try not to lift heavy objects without lifting with your knees   wear a core support   good supportive shoes  Note for work that she is allowed to wear [employer]  because of her plantar fasciitis            VAMSHI Farmer

## 2018-11-28 ENCOUNTER — TELEPHONE (OUTPATIENT)
Dept: PHYSICAL THERAPY | Facility: OTHER | Age: 43
End: 2018-11-28

## 2018-11-28 ENCOUNTER — OFFICE VISIT (OUTPATIENT)
Dept: FAMILY MEDICINE CLINIC | Facility: CLINIC | Age: 43
End: 2018-11-28
Payer: COMMERCIAL

## 2018-11-28 VITALS
WEIGHT: 148.6 LBS | HEART RATE: 90 BPM | DIASTOLIC BLOOD PRESSURE: 80 MMHG | SYSTOLIC BLOOD PRESSURE: 120 MMHG | TEMPERATURE: 98.8 F | RESPIRATION RATE: 16 BRPM | BODY MASS INDEX: 25.37 KG/M2 | OXYGEN SATURATION: 98 % | HEIGHT: 64 IN

## 2018-11-28 DIAGNOSIS — M54.41 ACUTE LOW BACK PAIN WITH RIGHT-SIDED SCIATICA, UNSPECIFIED BACK PAIN LATERALITY: Primary | ICD-10-CM

## 2018-11-28 DIAGNOSIS — F34.1 DYSTHYMIA: ICD-10-CM

## 2018-11-28 DIAGNOSIS — K21.9 GASTROESOPHAGEAL REFLUX DISEASE WITHOUT ESOPHAGITIS: ICD-10-CM

## 2018-11-28 DIAGNOSIS — G47.26 SHIFT WORK SLEEP DISORDER: ICD-10-CM

## 2018-11-28 DIAGNOSIS — R41.840 ATTENTION AND CONCENTRATION DEFICIT: ICD-10-CM

## 2018-11-28 PROCEDURE — 99214 OFFICE O/P EST MOD 30 MIN: CPT | Performed by: FAMILY MEDICINE

## 2018-11-28 PROCEDURE — 1036F TOBACCO NON-USER: CPT | Performed by: FAMILY MEDICINE

## 2018-11-28 PROCEDURE — 3008F BODY MASS INDEX DOCD: CPT | Performed by: FAMILY MEDICINE

## 2018-11-28 RX ORDER — ATOMOXETINE 40 MG/1
40 CAPSULE ORAL DAILY
Qty: 90 CAPSULE | Refills: 1 | Status: SHIPPED | OUTPATIENT
Start: 2018-11-28 | End: 2019-05-08 | Stop reason: CLARIF

## 2018-11-28 RX ORDER — BUPROPION HYDROCHLORIDE 300 MG/1
300 TABLET ORAL DAILY
Qty: 90 TABLET | Refills: 0 | Status: SHIPPED | OUTPATIENT
Start: 2018-11-28 | End: 2019-02-23 | Stop reason: SDUPTHER

## 2018-11-28 RX ORDER — DEXLANSOPRAZOLE 60 MG/1
60 CAPSULE, DELAYED RELEASE ORAL DAILY
Qty: 90 CAPSULE | Refills: 1 | Status: SHIPPED | OUTPATIENT
Start: 2018-11-28 | End: 2021-03-30 | Stop reason: ALTCHOICE

## 2018-11-28 RX ORDER — BACLOFEN 10 MG/1
10 TABLET ORAL 3 TIMES DAILY PRN
Qty: 90 TABLET | Refills: 3 | Status: SHIPPED | OUTPATIENT
Start: 2018-11-28 | End: 2019-01-26 | Stop reason: SDUPTHER

## 2018-11-28 RX ORDER — KETOROLAC TROMETHAMINE 30 MG/ML
60 INJECTION, SOLUTION INTRAMUSCULAR; INTRAVENOUS ONCE
Status: SHIPPED | OUTPATIENT
Start: 2018-11-28 | End: 2018-12-03

## 2018-11-28 RX ORDER — MODAFINIL 200 MG/1
200 TABLET ORAL DAILY
Qty: 30 TABLET | Refills: 3 | Status: SHIPPED | OUTPATIENT
Start: 2018-11-28 | End: 2019-04-03 | Stop reason: ALTCHOICE

## 2018-11-28 NOTE — TELEPHONE ENCOUNTER
Reached out to patient again after a second referral was placed by PCP  Patient never returned RN's multiple attempts at reaching her in August  Unsuccessful attempt at reaching patient  Left voicemail message with callback number, asking for return call

## 2018-11-28 NOTE — PATIENT INSTRUCTIONS
Toradol 60 mg IM now  Baclofen may be taken 3 times a day as needed for back spasms  Thermal Massage which is 5 min of heat followed by 5 min of cold to the area of the lower back 3 times a day  Have entered you into the comprehensive spine program  We have reviewed the red flags of a low back injury if any of these occur your to proceed to the emergency room immediately  Flu shot offered and deferred    All of her other meds have been refilled

## 2018-11-30 ENCOUNTER — DOCUMENTATION (OUTPATIENT)
Dept: PHYSICAL THERAPY | Facility: OTHER | Age: 43
End: 2018-11-30

## 2018-11-30 NOTE — PROGRESS NOTES
Still unable to reach patient  Duplicate referral to be closed  Patient should be directly referred to a specialist or therapy for further care for back pain

## 2018-12-04 ENCOUNTER — TELEPHONE (OUTPATIENT)
Dept: FAMILY MEDICINE CLINIC | Facility: CLINIC | Age: 43
End: 2018-12-04

## 2018-12-04 NOTE — TELEPHONE ENCOUNTER
Spoke with the patient and gave her the phone number in order for her to schedule an appt with them

## 2018-12-04 NOTE — TELEPHONE ENCOUNTER
----- Message from Rohini Gomez, [address] sent at 12/2/2018  7:00 PM EST -----  Regarding: RE: Duplicate Referral  Kathrin,  Could you please give April a call and forward this phone number of the spine program to her  She has chronic back pain and I've referred her several times  Thank you,     This is the note the spine center sent me  They will not attempt to call her anymore  There is an office in [facility] with both Comp Spine and other capable providers to handle her back pain  Their phone number is [phone]  Feel free to work directly with this patient to refer her for additional care, or re provide her with our phone number of [phone] to call back and complete the telephone triage  I have left voicemail messages with our phone number every time I've attempted to reach her       ----- Message -----  From: Anderson Christianson RN  Sent: 11/30/2018   2:15 PM  To: VAMSHI Sin  Subject: RE: Duplicate Referral                           Rodri Srivastava,     I'm happy to help  However, I have been unable to reach this patient and therefore closed out the referral  Unfortunately, I am unable to make appointments for patients without the telephone triage assessment  We perform triage assessments and place referrals to the appropriate providers and act as a triage/care coordination role  Per direct access laws, the patient could be scheduled on her own if she called the nearest office to her, or you could also directly refer her  There is an office in [facility] with both Comp Spine and other capable providers to handle her back pain  Their phone number is [phone]  Feel free to work directly with this patient to refer her for additional care, or re provide her with our phone number of [phone] to call back and complete the telephone triage  I have left voicemail messages with our phone number every time I've attempted to reach her         I hope the patient does either call back or will follow through on scheduling if you refer her directly, so that she can get the care she needs! If you have any questions about our program, please feel free to reach me at  [phone]  Thank you,   Nella Gaucher   ----- Message -----  From: VAMSHI López  Sent: 11/30/2018   1:25 PM  To: Pawan Obrien RN  Subject: RE: Duplicate Referral                           Nella Gaucher,  Thank you so much for all your help with this patient  At this point, I think the best approach will be for our nurses to actually make an appt with one of the physical therapy offices that offer this great program  Our office is located near the [facility], which is near the PT office near that mall  Do you know if that office offers the CSP? Can we set her up with an appt there? Atif  ----- Message -----  From: Pawan Obrien RN  Sent: 11/28/2018   1:36 PM  To: VAMSHI López  Subject: RE: Duplicate Referral                           I reached out to her again  She did not answer and I left another message  If she does not respond within a few days, it may be best to then give her a direct referral to a specialist or PT depending on what you feel is appropriate  Do you know if the patient permanently resides in [place]? In her chart she has a [facility] address, and we can only triage patients from [address] as that is where are licenses are   ----- Message -----  From: VAMSHI López  Sent: 11/28/2018  12:54 PM  To: Paawn Obrien RN  Subject: RE: Duplicate Referral                           I saw her in the office this morning and I again stressed the importance of this program  I think she will comply this time  Thank you,  Luis Fernandez  ----- Message -----  From: Pawan Obrien RN  Sent: 11/28/2018  10:43 AM  To: VAMSHI López  Subject: Duplicate Referral                               Good morning,  I see you have re referred this patient, Antonio Nesbitt, to [facility]   She was previously referred to our program by your office in August, and I made multiple unsuccessful attempts to reach her and triage her for further care  I left voicemail messages during each call attempt made and the patient did not return any calls  Am I to re attempt to contact this patient? Or do you feel she would better benefit from a direct referral to a specific office or specialty for scheduling, as she was previously unreachable for triage? Please advise   Thank you!  - Hang Nielson RN

## 2018-12-27 ENCOUNTER — OFFICE VISIT (OUTPATIENT)
Dept: FAMILY MEDICINE CLINIC | Facility: CLINIC | Age: 43
End: 2018-12-27
Payer: COMMERCIAL

## 2018-12-27 VITALS
HEIGHT: 64 IN | BODY MASS INDEX: 25.78 KG/M2 | WEIGHT: 151 LBS | HEART RATE: 80 BPM | SYSTOLIC BLOOD PRESSURE: 112 MMHG | TEMPERATURE: 99.1 F | RESPIRATION RATE: 20 BRPM | DIASTOLIC BLOOD PRESSURE: 78 MMHG

## 2018-12-27 DIAGNOSIS — B96.89 BACTERIAL URI: Primary | ICD-10-CM

## 2018-12-27 DIAGNOSIS — J06.9 BACTERIAL URI: Primary | ICD-10-CM

## 2018-12-27 DIAGNOSIS — R05.9 COUGH: ICD-10-CM

## 2018-12-27 PROCEDURE — 99214 OFFICE O/P EST MOD 30 MIN: CPT | Performed by: FAMILY MEDICINE

## 2018-12-27 PROCEDURE — 1036F TOBACCO NON-USER: CPT | Performed by: FAMILY MEDICINE

## 2018-12-27 RX ORDER — HYDROCODONE POLISTIREX AND CHLORPHENIRAMINE POLISTIREX 10; 8 MG/5ML; MG/5ML
5 SUSPENSION, EXTENDED RELEASE ORAL EVERY 12 HOURS PRN
Qty: 120 ML | Refills: 0 | Status: SHIPPED | OUTPATIENT
Start: 2018-12-27 | End: 2019-04-03 | Stop reason: SDUPTHER

## 2018-12-27 RX ORDER — AZITHROMYCIN 250 MG/1
TABLET, FILM COATED ORAL
Qty: 6 TABLET | Refills: 0 | Status: SHIPPED | OUTPATIENT
Start: 2018-12-27 | End: 2018-12-31

## 2018-12-27 NOTE — PROGRESS NOTES
Standard Visit   Assessment/Plan:     Visit Diagnosis:  Diagnoses and all orders for this visit:    Bacterial URI  -     azithromycin (ZITHROMAX) 250 mg tablet; Take 2 pills today then 1 pill days 2 through 5  -     hydrocodone-chlorpheniramine polistirex (TUSSIONEX) 10-8 mg/5 mL ER suspension; Take 5 mL by mouth every 12 (twelve) hours as needed for cough Max Daily Amount: 10 mL    Cough      Home from work  We will write you note  Complete the entire [facility]  I renew a cough syrup be careful with the cough syrup no driving if taken  You been sick for going on now 2 weeks  I want you out of work till Monday at least  Call if you fail to improve  To the emergency room if you worsen acutely        Subjective:    Patient ID: April M Madison Alvarez is a 41 y o  female  Patient is here with the following concerns:    Here with complaints of the deep barking cough  Productive  Head congestion  Feels very ill  Feeling flushed  There is no vomiting no stiff neck no rash  He has not had a very high fever  Coughing is keeping her up all night  Sore throat          The following portions of the patient's history were reviewed and updated as appropriate: allergies, current medications, past family history, past medical history, past social history, past surgical history and problem list     Review of Systems   Constitutional: Positive for activity change and fatigue  Negative for fever  HENT: Positive for congestion and postnasal drip  Respiratory: Positive for cough  Musculoskeletal: Negative for neck stiffness  Neurological: Positive for light-headedness           /78   Pulse 80   Temp 99 1 °F (37 3 °C)   Resp 20   Ht 5' 4" (1 626 m)   Wt 68 5 kg (151 lb)   BMI 25 92 kg/m²   Social History     Social History    Marital status: Single     Spouse name: N/A    Number of children: 2    Years of education: N/A     Occupational History    [employer]      Social History Main Topics    Smoking status: Never Smoker  Smokeless tobacco: Never Used    Alcohol use No    Drug use: No    Sexual activity: Not on file     Other Topics Concern    Not on file     Social History Narrative    Daily coffee consumption         Past Medical History:   Diagnosis Date    Anxiety     Arthritis     Chronic pain disorder     lumbar radiculopathy    Depression     GERD (gastroesophageal reflux disease)     Headache     Irritable bowel syndrome      Family History   Problem Relation Age of Onset    Depression Mother     Breast cancer Maternal Grandmother     Cancer Family      Past Surgical History:   Procedure Laterality Date    CERVICAL BIOPSY  W/ LOOP ELECTRODE EXCISION      HERNIA REPAIR      HERNIA REPAIR Left 9/27/2017    Procedure: LAPAROSCOPIC INGUINAL HERNIA REPAIR WITH MESH;  Surgeon: Ethel Dunham MD;  [facility]: [facility];  Service: General    VA ESOPHAGOGASTRODUODENOSCOPY TRANSORAL DIAGNOSTIC N/A 2/24/2017    Procedure: EGD AND COLONOSCOPY;  Surgeon: Carina Singh MD;  [facility];   Service: Gastroenterology       Current Outpatient Prescriptions:     atoMOXetine (STRATTERA) 40 mg capsule, Take 1 capsule (40 mg total) by mouth daily for 30 days, Disp: 90 capsule, Rfl: 1    baclofen 10 mg tablet, Take 1 tablet (10 mg total) by mouth 3 (three) times a day as needed for muscle spasms, Disp: 90 tablet, Rfl: 3    buPROPion (WELLBUTRIN XL) 300 mg 24 hr tablet, Take 1 tablet (300 mg total) by mouth daily, Disp: 90 tablet, Rfl: 0    busPIRone (BUSPAR) 30 MG tablet, Take 30 mg by mouth 4 (four) times a day as needed  , Disp: , Rfl:     dexlansoprazole (DEXILANT) 60 MG capsule, Take 1 capsule (60 mg total) by mouth daily for 30 days, Disp: 90 capsule, Rfl: 1    modafinil (PROVIGIL) 200 MG tablet, Take 1 tablet (200 mg total) by mouth daily, Disp: 30 tablet, Rfl: 3    azithromycin (ZITHROMAX) 250 mg tablet, Take 2 pills today then 1 pill days 2 through 5, Disp: 6 tablet, Rfl: 0   hydrocodone-chlorpheniramine polistirex (TUSSIONEX) 10-8 mg/5 mL ER suspension, Take 5 mL by mouth every 12 (twelve) hours as needed for cough Max Daily Amount: 10 mL, Disp: 120 mL, Rfl: 0      Objective:     Physical Exam   Constitutional: She is oriented to person, place, and time  She appears well-developed and well-nourished  HENT:   Head: Normocephalic and atraumatic  Both TMs red positive fluid level   Neck: Normal range of motion  Neck supple  Cardiovascular: Normal rate  Pulmonary/Chest: Effort normal and breath sounds normal    Musculoskeletal: Normal range of motion  Neurological: She is alert and oriented to person, place, and time  Skin: Skin is warm and dry  Psychiatric: She has a normal mood and affect  Nursing note and vitals reviewed        Social History     Social History    Marital status: Single     Spouse name: N/A    Number of children: 2    Years of education: N/A     Occupational History    [employer]      Social History Main Topics    Smoking status: Never Smoker    Smokeless tobacco: Never Used    Alcohol use No    Drug use: No    Sexual activity: Not on file     Other Topics Concern    Not on file     Social History Narrative    Daily coffee consumption         Past Medical History:   Diagnosis Date    Anxiety     Arthritis     Chronic pain disorder     lumbar radiculopathy    Depression     GERD (gastroesophageal reflux disease)     Headache     Irritable bowel syndrome        Current Outpatient Prescriptions:     atoMOXetine (STRATTERA) 40 mg capsule, Take 1 capsule (40 mg total) by mouth daily for 30 days, Disp: 90 capsule, Rfl: 1    baclofen 10 mg tablet, Take 1 tablet (10 mg total) by mouth 3 (three) times a day as needed for muscle spasms, Disp: 90 tablet, Rfl: 3    buPROPion (WELLBUTRIN XL) 300 mg 24 hr tablet, Take 1 tablet (300 mg total) by mouth daily, Disp: 90 tablet, Rfl: 0    busPIRone (BUSPAR) 30 MG tablet, Take 30 mg by mouth 4 (four) times a day as needed  , Disp: , Rfl:     dexlansoprazole (DEXILANT) 60 MG capsule, Take 1 capsule (60 mg total) by mouth daily for 30 days, Disp: 90 capsule, Rfl: 1    modafinil (PROVIGIL) 200 MG tablet, Take 1 tablet (200 mg total) by mouth daily, Disp: 30 tablet, Rfl: 3    azithromycin (ZITHROMAX) 250 mg tablet, Take 2 pills today then 1 pill days 2 through 5, Disp: 6 tablet, Rfl: 0    hydrocodone-chlorpheniramine polistirex (TUSSIONEX) 10-8 mg/5 mL ER suspension, Take 5 mL by mouth every 12 (twelve) hours as needed for cough Max Daily Amount: 10 mL, Disp: 120 mL, Rfl: 0  Family History   Problem Relation Age of Onset    Depression Mother     Breast cancer Maternal Grandmother     Cancer Family        Patient Instructions   Home from work  We will write you note  Complete the entire Z-[facility]  I renew a cough syrup be careful with the cough syrup no driving if taken  You been sick for going on now 2 weeks  I want you out of work till Monday at least  Call if you fail to improve  To the emergency room if you worsen acutely            VAMSHI Gutierrez

## 2018-12-27 NOTE — PATIENT INSTRUCTIONS
Home from work  We will write you note  Complete the entire Z-Harvey  I renew a cough syrup be careful with the cough syrup no driving if taken  You been sick for going on now 2 weeks  I want you out of work till Monday at least  Call if you fail to improve  To the emergency room if you worsen acutely

## 2019-01-02 ENCOUNTER — OFFICE VISIT (OUTPATIENT)
Dept: FAMILY MEDICINE CLINIC | Facility: CLINIC | Age: 44
End: 2019-01-02
Payer: COMMERCIAL

## 2019-01-02 VITALS
SYSTOLIC BLOOD PRESSURE: 121 MMHG | HEART RATE: 86 BPM | BODY MASS INDEX: 25.95 KG/M2 | HEIGHT: 64 IN | OXYGEN SATURATION: 96 % | TEMPERATURE: 100.6 F | WEIGHT: 152 LBS | DIASTOLIC BLOOD PRESSURE: 82 MMHG

## 2019-01-02 DIAGNOSIS — J02.9 SORE THROAT: Primary | ICD-10-CM

## 2019-01-02 DIAGNOSIS — J11.1 FLU: ICD-10-CM

## 2019-01-02 LAB — S PYO AG THROAT QL: POSITIVE

## 2019-01-02 PROCEDURE — 99214 OFFICE O/P EST MOD 30 MIN: CPT | Performed by: FAMILY MEDICINE

## 2019-01-02 PROCEDURE — 3008F BODY MASS INDEX DOCD: CPT | Performed by: FAMILY MEDICINE

## 2019-01-02 PROCEDURE — 87631 RESP VIRUS 3-5 TARGETS: CPT | Performed by: FAMILY MEDICINE

## 2019-01-02 PROCEDURE — 1036F TOBACCO NON-USER: CPT | Performed by: FAMILY MEDICINE

## 2019-01-02 PROCEDURE — 87880 STREP A ASSAY W/OPTIC: CPT | Performed by: FAMILY MEDICINE

## 2019-01-02 RX ORDER — OSELTAMIVIR PHOSPHATE 75 MG/1
CAPSULE ORAL
Qty: 10 CAPSULE | Refills: 0 | Status: SHIPPED | OUTPATIENT
Start: 2019-01-02 | End: 2019-01-06

## 2019-01-02 RX ORDER — AMOXICILLIN 500 MG/1
500 CAPSULE ORAL EVERY 8 HOURS SCHEDULED
Qty: 30 CAPSULE | Refills: 0 | Status: SHIPPED | OUTPATIENT
Start: 2019-01-02 | End: 2019-01-12

## 2019-01-02 NOTE — PROGRESS NOTES
Standard Visit   Assessment/Plan:     Visit Diagnosis:  Diagnoses and all orders for this visit:    Sore throat  -     POCT rapid strepA  -     amoxicillin (AMOXIL) 500 mg capsule; Take 1 capsule (500 mg total) by mouth every 8 (eight) hours for 10 days    Flu  -     oseltamivir (TAMIFLU) 75 mg capsule; Take 1 pill twice a day for 5 days      Swab for point of care strep was positive  Swab for flu a and B done in the office to be sent to the hospital  Note for work she is not to return to work for 7 days  Tamiflu to be given 75 mg to be given twice a day for 5 days  To cover the flu amoxicillin 500 mg 3 times a day for 10 days to be given to cover the positive strep culture  New toothbrush  Hydrate lots of fluids  You must stay home  Chicken ed soup  Call if you  fail to improve  To the emergency room if you worsen acutely      Subjective:    Patient ID: Ana M Louie is a 41 y o  female  Patient is here with the following concerns:    April is here with a fever  It started abruptly yesterday  Very sore throat  Viral symptoms  Body aches chills headache  No rash no vomiting  Body is sore but no stiff neck        The following portions of the patient's history were reviewed and updated as appropriate: allergies, current medications, past family history, past medical history, past social history, past surgical history and problem list     Review of Systems   Constitutional: Positive for activity change, chills, fatigue and fever  HENT: Positive for congestion and sore throat  Negative for trouble swallowing  Respiratory: Positive for cough  Negative for chest tightness, shortness of breath and wheezing  Gastrointestinal: Negative for vomiting  Musculoskeletal: Negative for neck stiffness  Neurological: Positive for light-headedness  Psychiatric/Behavioral: Positive for sleep disturbance           /82   Pulse 86   Temp (!) 100 6 °F (38 1 °C)   Ht 5' 4" (1 626 m)   Wt 68 9 kg (152 lb)   SpO2 96%   BMI 26 09 kg/m²   Social History     Social History    Marital status: Single     Spouse name: N/A    Number of children: 2    Years of education: N/A     Occupational History    [employer]      Social History Main Topics    Smoking status: Never Smoker    Smokeless tobacco: Never Used    Alcohol use No    Drug use: No    Sexual activity: Not on file     Other Topics Concern    Not on file     Social History Narrative    Daily coffee consumption         Past Medical History:   Diagnosis Date    Anxiety     Arthritis     Chronic pain disorder     lumbar radiculopathy    Depression     GERD (gastroesophageal reflux disease)     Headache     Irritable bowel syndrome      Family History   Problem Relation Age of Onset    Depression Mother     Breast cancer Maternal Grandmother     Cancer Family      Past Surgical History:   Procedure Laterality Date    CERVICAL BIOPSY  W/ LOOP ELECTRODE EXCISION      HERNIA REPAIR      HERNIA REPAIR Left 9/27/2017    Procedure: LAPAROSCOPIC INGUINAL HERNIA REPAIR WITH MESH;  Surgeon: Merline Dunnings, MD;  Location: [facility];  Service: General    VT ESOPHAGOGASTRODUODENOSCOPY TRANSORAL DIAGNOSTIC N/A 2/24/2017    Procedure: EGD AND COLONOSCOPY;  Surgeon: Herrera Sloan MD;  Location: [facility];   Service: Gastroenterology       Current Outpatient Prescriptions:     amoxicillin (AMOXIL) 500 mg capsule, Take 1 capsule (500 mg total) by mouth every 8 (eight) hours for 10 days, Disp: 30 capsule, Rfl: 0    atoMOXetine (STRATTERA) 40 mg capsule, Take 1 capsule (40 mg total) by mouth daily for 30 days, Disp: 90 capsule, Rfl: 1    baclofen 10 mg tablet, Take 1 tablet (10 mg total) by mouth 3 (three) times a day as needed for muscle spasms, Disp: 90 tablet, Rfl: 3    buPROPion (WELLBUTRIN XL) 300 mg 24 hr tablet, Take 1 tablet (300 mg total) by mouth daily, Disp: 90 tablet, Rfl: 0    busPIRone (BUSPAR) 30 MG tablet, Take 30 mg by mouth 4 (four) times a day as needed  , Disp: , Rfl:     dexlansoprazole (DEXILANT) 60 MG capsule, Take 1 capsule (60 mg total) by mouth daily for 30 days, Disp: 90 capsule, Rfl: 1    hydrocodone-chlorpheniramine polistirex (TUSSIONEX) 10-8 mg/5 mL ER suspension, Take 5 mL by mouth every 12 (twelve) hours as needed for cough Max Daily Amount: 10 mL, Disp: 120 mL, Rfl: 0    modafinil (PROVIGIL) 200 MG tablet, Take 1 tablet (200 mg total) by mouth daily, Disp: 30 tablet, Rfl: 3    oseltamivir (TAMIFLU) 75 mg capsule, Take 1 pill twice a day for 5 days, Disp: 10 capsule, Rfl: 0      Objective:     Physical Exam   Constitutional: She is oriented to person, place, and time  She has a sickly appearance  She appears ill  HENT:   Mouth/Throat: Oropharyngeal exudate present  Neck: Normal range of motion  Neck supple  Cardiovascular:   Tachycardic   Pulmonary/Chest: She has no wheezes  Abdominal: Soft  Musculoskeletal: Normal range of motion  Neurological: She is alert and oriented to person, place, and time  Skin: Skin is warm  She is diaphoretic     Febrile       Social History     Social History    Marital status: Single     Spouse name: N/A    Number of children: 2    Years of education: N/A     Occupational History    [employer]      Social History Main Topics    Smoking status: Never Smoker    Smokeless tobacco: Never Used    Alcohol use No    Drug use: No    Sexual activity: Not on file     Other Topics Concern    Not on file     Social History Narrative    Daily coffee consumption         Past Medical History:   Diagnosis Date    Anxiety     Arthritis     Chronic pain disorder     lumbar radiculopathy    Depression     GERD (gastroesophageal reflux disease)     Headache     Irritable bowel syndrome        Current Outpatient Prescriptions:     amoxicillin (AMOXIL) 500 mg capsule, Take 1 capsule (500 mg total) by mouth every 8 (eight) hours for 10 days, Disp: 30 capsule, Rfl: 0    atoMOXetine (STRATTERA) 40 mg capsule, Take 1 capsule (40 mg total) by mouth daily for 30 days, Disp: 90 capsule, Rfl: 1    baclofen 10 mg tablet, Take 1 tablet (10 mg total) by mouth 3 (three) times a day as needed for muscle spasms, Disp: 90 tablet, Rfl: 3    buPROPion (WELLBUTRIN XL) 300 mg 24 hr tablet, Take 1 tablet (300 mg total) by mouth daily, Disp: 90 tablet, Rfl: 0    busPIRone (BUSPAR) 30 MG tablet, Take 30 mg by mouth 4 (four) times a day as needed  , Disp: , Rfl:     dexlansoprazole (DEXILANT) 60 MG capsule, Take 1 capsule (60 mg total) by mouth daily for 30 days, Disp: 90 capsule, Rfl: 1    hydrocodone-chlorpheniramine polistirex (TUSSIONEX) 10-8 mg/5 mL ER suspension, Take 5 mL by mouth every 12 (twelve) hours as needed for cough Max Daily Amount: 10 mL, Disp: 120 mL, Rfl: 0    modafinil (PROVIGIL) 200 MG tablet, Take 1 tablet (200 mg total) by mouth daily, Disp: 30 tablet, Rfl: 3    oseltamivir (TAMIFLU) 75 mg capsule, Take 1 pill twice a day for 5 days, Disp: 10 capsule, Rfl: 0  Family History   Problem Relation Age of Onset    Depression Mother     Breast cancer Maternal Grandmother     Cancer Family        Patient Instructions   Swab for point of care strep was positive  Swab for flu a and B done in the office to be sent to the hospital  Note for work she is not to return to work for 7 days  Tamiflu to be given 75 mg to be given twice a day for 5 days  To cover the flu amoxicillin 500 mg 3 times a day for 10 days to be given to cover the positive strep culture  New toothbrush  Hydrate lots of fluids  You must stay home  Chicken ed souaime  Call if you  fail to improve  To the emergency room if you worsen acutely            VAMSHI Mayen

## 2019-01-02 NOTE — PATIENT INSTRUCTIONS
Swab for point of care strep was positive  Swab for flu a and B done in the office to be sent to the hospital  Note for work she is not to return to work for 7 days  Tamiflu to be given 75 mg to be given twice a day for 5 days  To cover the flu amoxicillin 500 mg 3 times a day for 10 days to be given to cover the positive strep culture  New toothbrush  Hydrate lots of fluids  You must stay home  Chicken noodle soup  Call if you  fail to improve  To the emergency room if you worsen acutely

## 2019-01-04 LAB
FLUAV AG SPEC QL: NORMAL
FLUBV AG SPEC QL: NORMAL
RSV B RNA SPEC QL NAA+PROBE: NORMAL

## 2019-01-09 ENCOUNTER — TELEPHONE (OUTPATIENT)
Dept: FAMILY MEDICINE CLINIC | Facility: CLINIC | Age: 44
End: 2019-01-09

## 2019-01-09 NOTE — TELEPHONE ENCOUNTER
Let her know that the flu screen was negative  And at this point we do not like to refill the Tussionex   I like over-the-counter Delsym

## 2019-01-09 NOTE — TELEPHONE ENCOUNTER
April called stated that she still has the cough   She will like to know if you can send in another refill of the T[facility]ex to [facility]

## 2019-01-26 DIAGNOSIS — M54.41 ACUTE LOW BACK PAIN WITH RIGHT-SIDED SCIATICA, UNSPECIFIED BACK PAIN LATERALITY: ICD-10-CM

## 2019-01-26 RX ORDER — BACLOFEN 10 MG/1
10 TABLET ORAL 3 TIMES DAILY PRN
Qty: 90 TABLET | Refills: 3 | Status: SHIPPED | OUTPATIENT
Start: 2019-01-26 | End: 2019-02-18 | Stop reason: SDUPTHER

## 2019-02-18 DIAGNOSIS — M54.41 ACUTE LOW BACK PAIN WITH RIGHT-SIDED SCIATICA, UNSPECIFIED BACK PAIN LATERALITY: ICD-10-CM

## 2019-02-18 RX ORDER — BACLOFEN 10 MG/1
10 TABLET ORAL 3 TIMES DAILY PRN
Qty: 90 TABLET | Refills: 0 | Status: SHIPPED | OUTPATIENT
Start: 2019-02-18 | End: 2019-06-12 | Stop reason: ALTCHOICE

## 2019-02-23 DIAGNOSIS — F34.1 DYSTHYMIA: ICD-10-CM

## 2019-03-01 RX ORDER — BUPROPION HYDROCHLORIDE 300 MG/1
TABLET ORAL
Qty: 90 TABLET | Refills: 0 | Status: SHIPPED | OUTPATIENT
Start: 2019-03-01 | End: 2019-03-04 | Stop reason: SDUPTHER

## 2019-03-04 DIAGNOSIS — F34.1 DYSTHYMIA: ICD-10-CM

## 2019-03-05 RX ORDER — BUPROPION HYDROCHLORIDE 300 MG/1
300 TABLET ORAL DAILY
Qty: 90 TABLET | Refills: 0 | Status: SHIPPED | OUTPATIENT
Start: 2019-03-05 | End: 2019-08-14 | Stop reason: SDUPTHER

## 2019-04-03 ENCOUNTER — OFFICE VISIT (OUTPATIENT)
Dept: FAMILY MEDICINE CLINIC | Facility: CLINIC | Age: 44
End: 2019-04-03
Payer: COMMERCIAL

## 2019-04-03 VITALS
DIASTOLIC BLOOD PRESSURE: 70 MMHG | SYSTOLIC BLOOD PRESSURE: 112 MMHG | HEART RATE: 86 BPM | HEIGHT: 64 IN | OXYGEN SATURATION: 98 % | BODY MASS INDEX: 26.29 KG/M2 | WEIGHT: 154 LBS

## 2019-04-03 DIAGNOSIS — R63.5 WEIGHT GAIN: ICD-10-CM

## 2019-04-03 DIAGNOSIS — M54.10 BACK PAIN WITH RADICULOPATHY: Primary | ICD-10-CM

## 2019-04-03 PROBLEM — G89.4 CHRONIC PAIN DISORDER: Status: ACTIVE | Noted: 2019-04-03

## 2019-04-03 PROBLEM — Z01.818 PREOPERATIVE TESTING: Status: RESOLVED | Noted: 2018-05-09 | Resolved: 2019-04-03

## 2019-04-03 PROCEDURE — 1036F TOBACCO NON-USER: CPT | Performed by: FAMILY MEDICINE

## 2019-04-03 PROCEDURE — 3008F BODY MASS INDEX DOCD: CPT | Performed by: FAMILY MEDICINE

## 2019-04-03 PROCEDURE — 99214 OFFICE O/P EST MOD 30 MIN: CPT | Performed by: FAMILY MEDICINE

## 2019-04-10 DIAGNOSIS — F41.9 ANXIETY: Primary | ICD-10-CM

## 2019-04-10 RX ORDER — BUSPIRONE HYDROCHLORIDE 30 MG/1
30 TABLET ORAL 3 TIMES DAILY PRN
Qty: 180 TABLET | Refills: 1 | Status: SHIPPED | OUTPATIENT
Start: 2019-04-10 | End: 2019-06-12 | Stop reason: ALTCHOICE

## 2019-05-08 ENCOUNTER — OFFICE VISIT (OUTPATIENT)
Dept: FAMILY MEDICINE CLINIC | Facility: CLINIC | Age: 44
End: 2019-05-08
Payer: COMMERCIAL

## 2019-05-08 VITALS
SYSTOLIC BLOOD PRESSURE: 102 MMHG | OXYGEN SATURATION: 98 % | BODY MASS INDEX: 25.71 KG/M2 | HEIGHT: 64 IN | WEIGHT: 150.6 LBS | DIASTOLIC BLOOD PRESSURE: 76 MMHG | HEART RATE: 74 BPM

## 2019-05-08 DIAGNOSIS — G89.4 CHRONIC PAIN DISORDER: Primary | ICD-10-CM

## 2019-05-08 DIAGNOSIS — R63.5 WEIGHT GAIN: ICD-10-CM

## 2019-05-08 DIAGNOSIS — K21.9 GASTROESOPHAGEAL REFLUX DISEASE WITHOUT ESOPHAGITIS: ICD-10-CM

## 2019-05-08 PROCEDURE — 99214 OFFICE O/P EST MOD 30 MIN: CPT | Performed by: FAMILY MEDICINE

## 2019-05-28 ENCOUNTER — TELEPHONE (OUTPATIENT)
Dept: FAMILY MEDICINE CLINIC | Facility: CLINIC | Age: 44
End: 2019-05-28

## 2019-05-29 ENCOUNTER — TELEPHONE (OUTPATIENT)
Dept: BEHAVIORAL/MENTAL HEALTH CLINIC | Facility: CLINIC | Age: 44
End: 2019-05-29

## 2019-06-10 ENCOUNTER — OFFICE VISIT (OUTPATIENT)
Dept: BEHAVIORAL/MENTAL HEALTH CLINIC | Facility: CLINIC | Age: 44
End: 2019-06-10
Payer: COMMERCIAL

## 2019-06-10 DIAGNOSIS — F41.1 GENERALIZED ANXIETY DISORDER: Primary | ICD-10-CM

## 2019-06-10 PROCEDURE — 90834 PSYTX W PT 45 MINUTES: CPT | Performed by: SOCIAL WORKER

## 2019-06-12 ENCOUNTER — TELEPHONE (OUTPATIENT)
Dept: PHYSICAL THERAPY | Facility: OTHER | Age: 44
End: 2019-06-12

## 2019-06-12 ENCOUNTER — OFFICE VISIT (OUTPATIENT)
Dept: FAMILY MEDICINE CLINIC | Facility: CLINIC | Age: 44
End: 2019-06-12
Payer: COMMERCIAL

## 2019-06-12 VITALS
BODY MASS INDEX: 25.44 KG/M2 | OXYGEN SATURATION: 96 % | DIASTOLIC BLOOD PRESSURE: 82 MMHG | SYSTOLIC BLOOD PRESSURE: 118 MMHG | WEIGHT: 149 LBS | HEART RATE: 85 BPM | HEIGHT: 64 IN

## 2019-06-12 DIAGNOSIS — Z12.31 SCREENING MAMMOGRAM, ENCOUNTER FOR: ICD-10-CM

## 2019-06-12 DIAGNOSIS — G44.229 CHRONIC TENSION-TYPE HEADACHE, NOT INTRACTABLE: ICD-10-CM

## 2019-06-12 DIAGNOSIS — R63.5 WEIGHT GAIN: ICD-10-CM

## 2019-06-12 DIAGNOSIS — G89.4 CHRONIC PAIN DISORDER: ICD-10-CM

## 2019-06-12 DIAGNOSIS — K21.9 GASTROESOPHAGEAL REFLUX DISEASE WITHOUT ESOPHAGITIS: ICD-10-CM

## 2019-06-12 DIAGNOSIS — M54.10 BACK PAIN WITH RADICULOPATHY: Primary | ICD-10-CM

## 2019-06-12 PROCEDURE — 3008F BODY MASS INDEX DOCD: CPT | Performed by: FAMILY MEDICINE

## 2019-06-12 PROCEDURE — 1036F TOBACCO NON-USER: CPT | Performed by: FAMILY MEDICINE

## 2019-06-12 PROCEDURE — 99214 OFFICE O/P EST MOD 30 MIN: CPT | Performed by: FAMILY MEDICINE

## 2019-06-12 RX ORDER — TOPIRAMATE 50 MG/1
TABLET, FILM COATED ORAL
Qty: 180 TABLET | Refills: 1 | Status: SHIPPED | OUTPATIENT
Start: 2019-06-12 | End: 2019-10-03

## 2019-06-12 RX ORDER — PHENTERMINE HYDROCHLORIDE 15 MG/1
CAPSULE ORAL
Refills: 3 | COMMUNITY
Start: 2019-05-08 | End: 2019-06-20 | Stop reason: SDUPTHER

## 2019-06-20 DIAGNOSIS — R63.5 WEIGHT GAIN: Primary | ICD-10-CM

## 2019-06-21 RX ORDER — PHENTERMINE HYDROCHLORIDE 15 MG/1
15 CAPSULE ORAL 2 TIMES DAILY
Qty: 60 CAPSULE | Refills: 3 | Status: SHIPPED | OUTPATIENT
Start: 2019-06-21 | End: 2019-08-07 | Stop reason: SDUPTHER

## 2019-07-13 DIAGNOSIS — F41.9 ANXIETY: ICD-10-CM

## 2019-07-14 RX ORDER — BUSPIRONE HYDROCHLORIDE 30 MG/1
TABLET ORAL
Qty: 270 TABLET | Refills: 1 | Status: SHIPPED | OUTPATIENT
Start: 2019-07-14 | End: 2020-03-02

## 2019-07-16 DIAGNOSIS — R63.5 WEIGHT GAIN: ICD-10-CM

## 2019-07-16 RX ORDER — PHENTERMINE HYDROCHLORIDE 15 MG/1
15 CAPSULE ORAL 2 TIMES DAILY
Qty: 60 CAPSULE | Refills: 3 | Status: CANCELLED | OUTPATIENT
Start: 2019-07-16

## 2019-08-07 DIAGNOSIS — R63.5 WEIGHT GAIN: ICD-10-CM

## 2019-08-07 RX ORDER — PHENTERMINE HYDROCHLORIDE 15 MG/1
15 CAPSULE ORAL 2 TIMES DAILY
Qty: 60 CAPSULE | Refills: 3 | Status: SHIPPED | OUTPATIENT
Start: 2019-08-07 | End: 2019-08-14 | Stop reason: ALTCHOICE

## 2019-08-14 ENCOUNTER — OFFICE VISIT (OUTPATIENT)
Dept: FAMILY MEDICINE CLINIC | Facility: CLINIC | Age: 44
End: 2019-08-14
Payer: COMMERCIAL

## 2019-08-14 VITALS
RESPIRATION RATE: 12 BRPM | WEIGHT: 149.4 LBS | SYSTOLIC BLOOD PRESSURE: 116 MMHG | HEART RATE: 82 BPM | DIASTOLIC BLOOD PRESSURE: 80 MMHG | HEIGHT: 64 IN | BODY MASS INDEX: 25.51 KG/M2 | TEMPERATURE: 98.9 F | OXYGEN SATURATION: 98 %

## 2019-08-14 DIAGNOSIS — F34.1 DYSTHYMIA: ICD-10-CM

## 2019-08-14 DIAGNOSIS — R63.5 WEIGHT GAIN: Primary | ICD-10-CM

## 2019-08-14 DIAGNOSIS — K40.91 UNILATERAL RECURRENT INGUINAL HERNIA WITHOUT OBSTRUCTION OR GANGRENE: ICD-10-CM

## 2019-08-14 PROBLEM — G89.4 CHRONIC PAIN DISORDER: Status: RESOLVED | Noted: 2019-04-03 | Resolved: 2019-08-14

## 2019-08-14 PROBLEM — K21.9 GASTROESOPHAGEAL REFLUX DISEASE WITHOUT ESOPHAGITIS: Status: RESOLVED | Noted: 2018-05-09 | Resolved: 2019-08-14

## 2019-08-14 PROBLEM — M25.552 HIP PAIN, ACUTE, LEFT: Status: RESOLVED | Noted: 2018-05-09 | Resolved: 2019-08-14

## 2019-08-14 PROCEDURE — 99214 OFFICE O/P EST MOD 30 MIN: CPT | Performed by: FAMILY MEDICINE

## 2019-08-14 PROCEDURE — 3008F BODY MASS INDEX DOCD: CPT | Performed by: FAMILY MEDICINE

## 2019-08-14 PROCEDURE — 1036F TOBACCO NON-USER: CPT | Performed by: FAMILY MEDICINE

## 2019-08-14 RX ORDER — BUPROPION HYDROCHLORIDE 300 MG/1
300 TABLET ORAL DAILY
Qty: 90 TABLET | Refills: 1 | Status: SHIPPED | OUTPATIENT
Start: 2019-08-14 | End: 2019-10-03 | Stop reason: SDUPTHER

## 2019-08-14 NOTE — PROGRESS NOTES
Standard Visit   Assessment/Plan:     Visit Diagnosis:  Diagnoses and all orders for this visit:    Weight gain  -     Phentermine HCl 30 MG TBDP; Take 1 pill daily before noon    Dysthymia  -     buPROPion (WELLBUTRIN XL) 300 mg 24 hr tablet; Take 1 tablet (300 mg total) by mouth daily    Unilateral recurrent inguinal hernia without obstruction or gangrene  -     Ambulatory referral to General Surgery; Future    OK we are going to increase her phentermine to 30 mg once a day  It is okay to miss it on the weekends if you want  Remember sometimes the less you   use it the better it works  So it is okay to take a drug holiday from this  Remember to eat vegetables 1st then fruit then protein  Do not eat drink fluids 30 minutes before a meal or 30 minutes after meal  Keep moving        Subjective:    Patient ID: Ana M Russ is a 45 y o  female       Patient is here with the following concerns:    Here for a weight check  And med adjustment  She has been on phentermine 15 for over 3 months now  She has done extremely well  Her BMI is almost perfect  Goal was under 25 and now she is 25 6  She is ready to make a bump up to the phentermine 30  She is doing very well  She knows not to take it with anything acidic  She sleeps okay with it so we do not need to take her off of it her blood pressure is not elevated on it so she is very little side effects  And she has lost weight  She has also had a hernia repair several years ago  Right inguinal hernia  This was done by Dr Jayda Tavares several several years ago  This area has begun to hurt her again she has mesh in that area  The surgeon who did this procedure has left the area several years ago  She would like the area recheck        The following portions of the patient's history were reviewed and updated as appropriate: allergies, current medications, past family history, past medical history, past social history, past surgical history and problem list     Review of Systems   Constitutional: Negative for activity change and fever  HENT: Negative for nosebleeds and trouble swallowing  Eyes: Negative  Respiratory: Negative  Cardiovascular: Negative for palpitations  Gastrointestinal: Negative for abdominal pain, blood in stool and vomiting  Endocrine: Negative for cold intolerance  Genitourinary: Negative  Musculoskeletal: Negative for neck stiffness  Right inguinal area   Skin: Negative for pallor  Allergic/Immunologic: Negative for immunocompromised state  Neurological: Negative for seizures and facial asymmetry  Hematological: Negative for adenopathy  Psychiatric/Behavioral: Negative for agitation and suicidal ideas           /80   Pulse 82   Temp 98 9 °F (37 2 °C)   Resp 12   Ht 5' 4" (1 626 m)   Wt 67 8 kg (149 lb 6 4 oz)   LMP 08/14/2019   SpO2 98%   BMI 25 64 kg/m²   Social History     Socioeconomic History    Marital status: Single     Spouse name: Not on file    Number of children: 2    Years of education: Not on file    Highest education level: Not on file   Occupational History    Occupation: [employer]   Social Needs    Financial resource strain: Not on file    Food insecurity:     Worry: Not on file     Inability: Not on file    Transportation needs:     Medical: Not on file     Non-medical: Not on file   Tobacco Use    Smoking status: Never Smoker    Smokeless tobacco: Never Used   Substance and Sexual Activity    Alcohol use: No    Drug use: No    Sexual activity: Not on file   Lifestyle    Physical activity:     Days per week: Not on file     Minutes per session: Not on file    Stress: Not on file   Relationships    Social connections:     Talks on phone: Not on file     Gets together: Not on file     Attends Pentecostalism service: Not on file     Active member of club or organization: Not on file     Attends meetings of clubs or organizations: Not on file     Relationship status: Not on file    Intimate partner violence:     Fear of current or ex partner: Not on file     Emotionally abused: Not on file     Physically abused: Not on file     Forced sexual activity: Not on file   Other Topics Concern    Not on file   Social History Narrative    Daily coffee consumption     Past Medical History:   Diagnosis Date    Anxiety     Chronic pain disorder     lumbar radiculopathy    Depression     Headache      Family History   Problem Relation Age of Onset    Depression Mother     Breast cancer Maternal Grandmother     Cancer Family      Past Surgical History:   Procedure Laterality Date    CERVICAL BIOPSY  W/ LOOP ELECTRODE EXCISION      HERNIA REPAIR      HERNIA REPAIR Left 9/27/2017    Procedure: LAPAROSCOPIC INGUINAL HERNIA REPAIR WITH MESH;  Surgeon: Chico Yoon MD;  [facility]: [facility];  Service: General    AL ESOPHAGOGASTRODUODENOSCOPY TRANSORAL DIAGNOSTIC N/A 2/24/2017    Procedure: EGD AND COLONOSCOPY;  Surgeon: Henrry Sun MD;  Location[facility]; Service: Gastroenterology       Current Outpatient Medications:     buPROPion (WELLBUTRIN XL) 300 mg 24 hr tablet, Take 1 tablet (300 mg total) by mouth daily, Disp: 90 tablet, Rfl: 1    busPIRone (BUSPAR) 30 MG tablet, TAKE 1 TABLET BY MOUTH 3 TIMES A DAY AS NEEDED FOR ANXIETY, Disp: 270 tablet, Rfl: 1    dexlansoprazole (DEXILANT) 60 MG capsule, Take 1 capsule (60 mg total) by mouth daily for 30 days, Disp: 90 capsule, Rfl: 1    topiramate (TOPAMAX) 50 MG tablet, topirimate 50mg by mouth twice a day , Disp: 180 tablet, Rfl: 1    Phentermine HCl 30 MG TBDP, Take 1 pill daily before noon, Disp: 30 tablet, Rfl: 3      Objective:     Physical Exam   Constitutional: She is oriented to person, place, and time  She appears well-developed and well-nourished  HENT:   Head: Normocephalic and atraumatic  Eyes: Pupils are equal, round, and reactive to light  Neck: Normal range of motion  Neck supple  Cardiovascular: Normal rate  Pulmonary/Chest: Effort normal and breath sounds normal    Abdominal: Soft  Musculoskeletal: Normal range of motion  Neurological: She is alert and oriented to person, place, and time  Skin: Skin is warm and dry  Psychiatric: She has a normal mood and affect  Nursing note and vitals reviewed        Social History     Socioeconomic History    Marital status: Single     Spouse name: Not on file    Number of children: 2    Years of education: Not on file    Highest education level: Not on file   Occupational History    Occupation: [employer]   Social Needs    Financial resource strain: Not on file    Food insecurity:     Worry: Not on file     Inability: Not on file    Transportation needs:     Medical: Not on file     Non-medical: Not on file   Tobacco Use    Smoking status: Never Smoker    Smokeless tobacco: Never Used   Substance and Sexual Activity    Alcohol use: No    Drug use: No    Sexual activity: Not on file   Lifestyle    Physical activity:     Days per week: Not on file     Minutes per session: Not on file    Stress: Not on file   Relationships    Social connections:     Talks on phone: Not on file     Gets together: Not on file     Attends Scientologist service: Not on file     Active member of club or organization: Not on file     Attends meetings of clubs or organizations: Not on file     Relationship status: Not on file    Intimate partner violence:     Fear of current or ex partner: Not on file     Emotionally abused: Not on file     Physically abused: Not on file     Forced sexual activity: Not on file   Other Topics Concern    Not on file   Social History Narrative    Daily coffee consumption     Past Medical History:   Diagnosis Date    Anxiety     Chronic pain disorder     lumbar radiculopathy    Depression     Headache        Current Outpatient Medications:     buPROPion (WELLBUTRIN XL) 300 mg 24 hr tablet, Take 1 tablet (300 mg total) by mouth daily, Disp: 90 tablet, Rfl: 1    busPIRone (BUSPAR) 30 MG tablet, TAKE 1 TABLET BY MOUTH 3 TIMES A DAY AS NEEDED FOR ANXIETY, Disp: 270 tablet, Rfl: 1    dexlansoprazole (DEXILANT) 60 MG capsule, Take 1 capsule (60 mg total) by mouth daily for 30 days, Disp: 90 capsule, Rfl: 1    topiramate (TOPAMAX) 50 MG tablet, topirimate 50mg by mouth twice a day , Disp: 180 tablet, Rfl: 1    Phentermine HCl 30 MG TBDP, Take 1 pill daily before noon, Disp: 30 tablet, Rfl: 3  Family History   Problem Relation Age of Onset    Depression Mother     Breast cancer Maternal Grandmother     Cancer Family      There are no Patient Instructions on file for this visit          Patrice Meckel, CRNP

## 2019-08-29 ENCOUNTER — TELEPHONE (OUTPATIENT)
Dept: SURGERY | Facility: CLINIC | Age: 44
End: 2019-08-29

## 2019-08-29 NOTE — TELEPHONE ENCOUNTER
DAJUAN: Geena Sainz   IN CASE YOU GET THE CALL-  Pt called to set up appt with Sandovla Staley  was a patinet MANY years ago  She said she had surgery done by some one else and has been having problems with the surgery  She did not say WHAT surgery was done, DATE of surgery, NAME of Surgeon or HOSPITAL it was done in  Her phone cut out    I called her back to get all info and schedule- she did not answer, and her voicemail box was " full and no longer accepting any msgs"    If she doesn't reach back out to scheduling department or us directly, I will call her on Tuesday when I get back from [facility]

## 2019-09-03 DIAGNOSIS — R41.840 ATTENTION AND CONCENTRATION DEFICIT: ICD-10-CM

## 2019-09-03 RX ORDER — ATOMOXETINE 40 MG/1
CAPSULE ORAL
Qty: 90 CAPSULE | Refills: 1 | Status: SHIPPED | OUTPATIENT
Start: 2019-09-03 | End: 2020-03-11 | Stop reason: SDUPTHER

## 2019-09-11 ENCOUNTER — CONSULT (OUTPATIENT)
Dept: SURGERY | Facility: CLINIC | Age: 44
End: 2019-09-11
Payer: COMMERCIAL

## 2019-09-11 VITALS
HEART RATE: 92 BPM | TEMPERATURE: 98.4 F | HEIGHT: 64 IN | BODY MASS INDEX: 25.27 KG/M2 | SYSTOLIC BLOOD PRESSURE: 126 MMHG | DIASTOLIC BLOOD PRESSURE: 82 MMHG | WEIGHT: 148 LBS | RESPIRATION RATE: 12 BRPM

## 2019-09-11 DIAGNOSIS — R10.31 RIGHT GROIN PAIN: Primary | ICD-10-CM

## 2019-09-11 DIAGNOSIS — R10.31 RIGHT LOWER QUADRANT PAIN: ICD-10-CM

## 2019-09-11 PROCEDURE — 99241 PR OFFICE CONSULTATION NEW/ESTAB PATIENT 15 MIN: CPT | Performed by: SURGERY

## 2019-09-22 NOTE — PROGRESS NOTES
Assessment/Plan:       Diagnoses and all orders for this visit:    Right groin pain  -     CT abdomen pelvis w contrast; Future  -     Creatinine, serum; Future    Right lower quadrant pain  -     CT abdomen pelvis w contrast; Future  -     Creatinine, serum; Future       Recommend CT as I do not palpate a recurrence and the right lower quadrant pain seems quite prominent in her complaints  She is hoping to be reassured  Subjective:      Patient ID: Lalito Gann is a 45 y o  female  Triage Notes:    Ms Katherine Olmedo is presenting with a concern about a prior inguinal hernia mesh surgery  She has both right lower quadrant pain as well as right lower quadrant abdominal pain  She had an open right inguinal hernia repair several years ago  In reviewing her records she had a symptomatic right inguinal hernia, umbilical hernia and asymptomatic left inguinal hernia and underwent repair of the right and the umbilical hernia  She was then later seen by Dr Bhumi Elder and underwent laparoscopic left inguinal hernia repair  She has no concerns about the left but on the right has been having more pain and discomfort both in the groin and the right lower abdomen  She does a lot of heavy lifting at work at [employer]  She is concerned about a possible recurrence  The following portions of the patient's history were reviewed and updated as appropriate: allergies, current medications, past family history, past medical history, past social history, past surgical history and problem list     Review of Systems   Constitutional: Negative for activity change and appetite change  HENT: Negative for congestion, hearing loss, sore throat and trouble swallowing  Eyes: Negative for discharge and visual disturbance  Respiratory: Negative for cough, chest tightness, shortness of breath and wheezing  Cardiovascular: Negative for chest pain and palpitations  Gastrointestinal: Negative for abdominal pain     Endocrine: Negative for cold intolerance and heat intolerance  Genitourinary: Negative for difficulty urinating  Musculoskeletal: Negative for gait problem  Skin: Negative for color change, rash and wound  Neurological: Negative for dizziness, speech difficulty and headaches  Psychiatric/Behavioral: Negative for behavioral problems and confusion  The patient is not nervous/anxious  Objective:      /82 (BP Location: Left arm, Patient Position: Sitting, Cuff Size: Standard)   Pulse 92   Temp 98 4 °F (36 9 °C) (Oral)   Resp 12   Ht 5' 4" (1 626 m)   Wt 67 1 kg (148 lb)   LMP 08/14/2019   BMI 25 40 kg/m²          Physical Exam   Constitutional: She is oriented to person, place, and time  She appears well-developed and well-nourished  No distress  HENT:   Head: Normocephalic and atraumatic  Eyes: Pupils are equal, round, and reactive to light  EOM are normal    Neck: Normal range of motion  Neck supple  Cardiovascular: Normal rate and regular rhythm  Pulmonary/Chest: Effort normal and breath sounds normal    Abdominal: Soft  She exhibits no mass  There is tenderness (RLQ)  No hernia  Genitourinary:   Genitourinary Comments: There does appear to be a ridge or dense scar in the right groin along the incision   Musculoskeletal: Normal range of motion  Neurological: She is alert and oriented to person, place, and time  Skin: Skin is warm and dry  She is not diaphoretic  Psychiatric: She has a normal mood and affect  Nursing note and vitals reviewed

## 2019-09-25 ENCOUNTER — HOSPITAL ENCOUNTER (OUTPATIENT)
Dept: CT IMAGING | Facility: CLINIC | Age: 44
Discharge: HOME | End: 2019-09-25
Payer: COMMERCIAL

## 2019-09-25 DIAGNOSIS — R10.31 RIGHT GROIN PAIN: ICD-10-CM

## 2019-09-25 DIAGNOSIS — R10.31 RIGHT LOWER QUADRANT PAIN: ICD-10-CM

## 2019-09-25 PROCEDURE — 74177 CT ABD & PELVIS W/CONTRAST: CPT

## 2019-09-25 RX ADMIN — IOHEXOL 100 ML: 350 INJECTION, SOLUTION INTRAVENOUS at 11:35

## 2019-09-30 ENCOUNTER — OFFICE VISIT (OUTPATIENT)
Dept: SURGERY | Facility: CLINIC | Age: 44
End: 2019-09-30
Payer: COMMERCIAL

## 2019-09-30 VITALS
WEIGHT: 141 LBS | SYSTOLIC BLOOD PRESSURE: 110 MMHG | RESPIRATION RATE: 13 BRPM | DIASTOLIC BLOOD PRESSURE: 84 MMHG | TEMPERATURE: 97.9 F | HEART RATE: 80 BPM | HEIGHT: 64 IN | BODY MASS INDEX: 24.07 KG/M2

## 2019-09-30 DIAGNOSIS — R10.31 GROIN PAIN, CHRONIC, RIGHT: Primary | ICD-10-CM

## 2019-09-30 DIAGNOSIS — G89.29 GROIN PAIN, CHRONIC, RIGHT: Primary | ICD-10-CM

## 2019-09-30 PROCEDURE — 99213 OFFICE O/P EST LOW 20 MIN: CPT | Performed by: SURGERY

## 2019-10-03 ENCOUNTER — OFFICE VISIT (OUTPATIENT)
Dept: FAMILY MEDICINE CLINIC | Facility: CLINIC | Age: 44
End: 2019-10-03
Payer: COMMERCIAL

## 2019-10-03 VITALS
TEMPERATURE: 98.3 F | WEIGHT: 149 LBS | SYSTOLIC BLOOD PRESSURE: 104 MMHG | OXYGEN SATURATION: 98 % | BODY MASS INDEX: 25.44 KG/M2 | DIASTOLIC BLOOD PRESSURE: 68 MMHG | HEART RATE: 68 BPM | HEIGHT: 64 IN

## 2019-10-03 DIAGNOSIS — R52 PAIN: Primary | ICD-10-CM

## 2019-10-03 DIAGNOSIS — L98.9 SKIN LESION: ICD-10-CM

## 2019-10-03 DIAGNOSIS — F34.1 DYSTHYMIA: ICD-10-CM

## 2019-10-03 DIAGNOSIS — R63.5 WEIGHT GAIN: ICD-10-CM

## 2019-10-03 DIAGNOSIS — G44.229 CHRONIC TENSION-TYPE HEADACHE, NOT INTRACTABLE: ICD-10-CM

## 2019-10-03 PROCEDURE — 3008F BODY MASS INDEX DOCD: CPT | Performed by: FAMILY MEDICINE

## 2019-10-03 PROCEDURE — 99214 OFFICE O/P EST MOD 30 MIN: CPT | Performed by: FAMILY MEDICINE

## 2019-10-03 RX ORDER — BUPROPION HYDROCHLORIDE 300 MG/1
300 TABLET ORAL DAILY
Qty: 90 TABLET | Refills: 1 | Status: SHIPPED | OUTPATIENT
Start: 2019-10-03 | End: 2021-01-27 | Stop reason: SDUPTHER

## 2019-10-03 RX ORDER — TOPIRAMATE 100 MG/1
TABLET, FILM COATED ORAL
Qty: 180 TABLET | Refills: 1 | Status: SHIPPED | OUTPATIENT
Start: 2019-10-03 | End: 2020-03-11 | Stop reason: SDUPTHER

## 2019-10-03 RX ORDER — PHENTERMINE HYDROCHLORIDE 37.5 MG/1
TABLET ORAL
Qty: 30 TABLET | Refills: 3 | Status: SHIPPED | OUTPATIENT
Start: 2019-10-03 | End: 2019-12-11 | Stop reason: ALTCHOICE

## 2019-10-03 NOTE — PROGRESS NOTES
Standard Visit   Assessment/Plan:     Visit Diagnosis:  Diagnoses and all orders for this visit:    Pain  -     XR hand 3+ vw left; Future  -     XR hand 3+ vw right; Future    Dysthymia  -     buPROPion (WELLBUTRIN XL) 300 mg 24 hr tablet; Take 1 tablet (300 mg total) by mouth daily    Weight gain  -     phentermine (ADIPEX-P) 37 5 MG tablet; Take 1 pill daily before noon    Skin lesion  -     Ambulatory referral to Dermatology; Future    Chronic tension-type headache, not intractable  -     topiramate (TOPAMAX) 100 mg tablet; Take 1 pill twice a day     Topamax increased to 100 twice a day  Medications refilled  I want to get x-rays of both hands  Dermatology consult ordered  Follow up with her ultrasound of the hernia on October 9th        Subjective:    Patient ID: April M Brigitte Juarez is a 45 y o  female  Patient is here with the following concerns:    Here for checkup  With her last visit we adjusted her phentermine  Also she had concerns about her hernia repair  I had center to the surgeon  Her original surgeon that did the original hernia repair had left the area so we center to a [facility]  She had a CT scan that showed the hernia repair was stable however it showed a minor soft tissue prominence  The radiologist suggested that a ultrasound could be done of that area  She had concerns because she had some discomfort in that area  She works for [employer] and does some heavy lifting  So there is always a concern for disruption of the hernia repair  The hernia is on the right  She does have an ultrasound of that area scheduled on October 9th April states that when she is at work it bothers her more  And the area is uncomfortable  She describes it "as sore"  Weight stable  She always runs under the 150s  She is taking her phentermine  She is also on Topamax 50 however we are going to increase the dose to 100 twice a day  She is on Wellbutrin 300 mg  She needs a refill on that      One month ago patient fell on to flexed left hand  She was seen in urgent care x-ray was done there was no fracture  However that hand is still hurting    Also patient has a enlarging skin lesion on her right temple  This has gotten larger  It is starting to hurt  She thinks this color of it has changed  The other day it was bleeding  And starting to itch        The following portions of the patient's history were reviewed and updated as appropriate: allergies, current medications, past family history, past medical history, past social history, past surgical history and problem list     Review of Systems   Constitutional: Negative for activity change and fever  HENT: Negative for nosebleeds and trouble swallowing  Eyes: Negative  Respiratory: Negative  Cardiovascular: Negative for palpitations  Gastrointestinal: Negative for abdominal pain, blood in stool and vomiting  Endocrine: Negative for cold intolerance  Genitourinary: Negative  Musculoskeletal: Positive for arthralgias  Negative for neck stiffness  Skin: Negative for pallor  I have a mole on the side of my temple that is getting larger  I think the color has changed  The other day it was bleeding  And constantly itches   Allergic/Immunologic: Negative for immunocompromised state  Neurological: Negative for seizures and facial asymmetry  Hematological: Negative for adenopathy  Psychiatric/Behavioral: Negative for agitation and suicidal ideas           /68   Pulse 68   Temp 98 3 °F (36 8 °C) (Tympanic)   Ht 5' 4" (1 626 m)   Wt 67 6 kg (149 lb)   LMP 09/20/2019 (Exact Date)   SpO2 98%   BMI 25 58 kg/m²   Social History     Socioeconomic History    Marital status: Single     Spouse name: Not on file    Number of children: 2    Years of education: Not on file    Highest education level: Not on file   Occupational History    Occupation: [employer]   Social Needs    Financial resource strain: Not on file    Food insecurity: Worry: Not on file     Inability: Not on file    Transportation needs:     Medical: Not on file     Non-medical: Not on file   Tobacco Use    Smoking status: Never Smoker    Smokeless tobacco: Never Used   Substance and Sexual Activity    Alcohol use: No    Drug use: No    Sexual activity: Not on file   Lifestyle    Physical activity:     Days per week: Not on file     Minutes per session: Not on file    Stress: Not on file   Relationships    Social connections:     Talks on phone: Not on file     Gets together: Not on file     Attends Sikh service: Not on file     Active member of club or organization: Not on file     Attends meetings of clubs or organizations: Not on file     Relationship status: Not on file    Intimate partner violence:     Fear of current or ex partner: Not on file     Emotionally abused: Not on file     Physically abused: Not on file     Forced sexual activity: Not on file   Other Topics Concern    Not on file   Social History Narrative    Daily coffee consumption     Past Medical History:   Diagnosis Date    Anxiety     Chronic pain disorder     lumbar radiculopathy    Depression     Headache      Family History   Problem Relation Age of Onset    Depression Mother     Breast cancer Maternal Grandmother     Cancer Family      Past Surgical History:   Procedure Laterality Date    CERVICAL BIOPSY  W/ LOOP ELECTRODE EXCISION      HERNIA REPAIR      HERNIA REPAIR Left 9/27/2017    Procedure: LAPAROSCOPIC INGUINAL HERNIA REPAIR WITH MESH;  Surgeon: Sudhakar Swanson MD;  Location: [facility];  Service: General    DC ESOPHAGOGASTRODUODENOSCOPY TRANSORAL DIAGNOSTIC N/A 2/24/2017    Procedure: EGD AND COLONOSCOPY;  Surgeon: Leidy Zepeda MD;  Location: [facility];   Service: Gastroenterology       Current Outpatient Medications:     atoMOXetine (STRATTERA) 40 mg capsule, TAKE 1 CAPSULE BY MOUTH DAILY, Disp: 90 capsule, Rfl: 1    buPROPion (WELLBUTRIN XL) 300 mg 24 hr tablet, Take 1 tablet (300 mg total) by mouth daily, Disp: 90 tablet, Rfl: 1    busPIRone (BUSPAR) 30 MG tablet, TAKE 1 TABLET BY MOUTH 3 TIMES A DAY AS NEEDED FOR ANXIETY, Disp: 270 tablet, Rfl: 1    dexlansoprazole (DEXILANT) 60 MG capsule, Take 1 capsule (60 mg total) by mouth daily for 30 days, Disp: 90 capsule, Rfl: 1    phentermine (ADIPEX-P) 37 5 MG tablet, Take 1 pill daily before noon, Disp: 30 tablet, Rfl: 3    topiramate (TOPAMAX) 100 mg tablet, Take 1 pill twice a day, Disp: 180 tablet, Rfl: 1      Objective:     Physical Exam   Constitutional: She is oriented to person, place, and time  She appears well-developed and well-nourished  HENT:   Head: Normocephalic and atraumatic  Eyes: Pupils are equal, round, and reactive to light  Neck: Normal range of motion  Neck supple  Cardiovascular: Normal rate  Pulmonary/Chest: Effort normal and breath sounds normal    Abdominal: Soft  Musculoskeletal: She exhibits tenderness  Patient appears to have a slight ganglion cyst at the base of right and left anterior wrist  Decreased range of motion of left wrist     Neurological: She is alert and oriented to person, place, and time  Skin: Skin is warm and dry  3 cm round irregularly shaped mass right temple  Inflamed base   Psychiatric: She has a normal mood and affect  Nursing note and vitals reviewed  Of note  It does appear actually that there is a small ganglion at the base of the dorsal hand on both hands  There are no Patient Instructions on file for this visit          AVMSHI Sifuentes

## 2019-10-16 ENCOUNTER — APPOINTMENT (OUTPATIENT)
Dept: RADIOLOGY | Facility: CLINIC | Age: 44
End: 2019-10-16
Payer: COMMERCIAL

## 2019-10-16 ENCOUNTER — HOSPITAL ENCOUNTER (OUTPATIENT)
Dept: ULTRASOUND IMAGING | Facility: CLINIC | Age: 44
Discharge: HOME | End: 2019-10-16
Payer: COMMERCIAL

## 2019-10-16 DIAGNOSIS — R10.31 GROIN PAIN, CHRONIC, RIGHT: ICD-10-CM

## 2019-10-16 DIAGNOSIS — G89.29 GROIN PAIN, CHRONIC, RIGHT: ICD-10-CM

## 2019-10-16 DIAGNOSIS — R52 PAIN: ICD-10-CM

## 2019-10-16 PROCEDURE — 76705 ECHO EXAM OF ABDOMEN: CPT

## 2019-10-16 PROCEDURE — 73130 X-RAY EXAM OF HAND: CPT

## 2019-10-21 ENCOUNTER — TELEPHONE (OUTPATIENT)
Dept: FAMILY MEDICINE CLINIC | Facility: CLINIC | Age: 44
End: 2019-10-21

## 2019-10-21 NOTE — TELEPHONE ENCOUNTER
Called pt no answer ,v/m not set up       ----- Message from Nannette Arteaga sent at 10/17/2019  2:17 PM EDT -----  Let pt know their xray was ok

## 2019-10-22 ENCOUNTER — TELEPHONE (OUTPATIENT)
Dept: FAMILY MEDICINE CLINIC | Facility: CLINIC | Age: 44
End: 2019-10-22

## 2019-11-12 ENCOUNTER — TRANSCRIBE ORDERS (OUTPATIENT)
Dept: ADMINISTRATIVE | Facility: HOSPITAL | Age: 44
End: 2019-11-12

## 2019-11-12 DIAGNOSIS — M16.0 OSTEOARTHRITIS OF BOTH HIPS, UNSPECIFIED OSTEOARTHRITIS TYPE: Primary | ICD-10-CM

## 2019-11-12 DIAGNOSIS — M43.06 SPONDYLOLYSIS OF LUMBAR REGION: ICD-10-CM

## 2019-11-13 ENCOUNTER — APPOINTMENT (OUTPATIENT)
Dept: RADIOLOGY | Facility: CLINIC | Age: 44
End: 2019-11-13
Payer: COMMERCIAL

## 2019-11-13 DIAGNOSIS — M43.06 SPONDYLOLYSIS OF LUMBAR REGION: ICD-10-CM

## 2019-11-13 DIAGNOSIS — M16.0 OSTEOARTHRITIS OF BOTH HIPS, UNSPECIFIED OSTEOARTHRITIS TYPE: ICD-10-CM

## 2019-11-13 PROCEDURE — 72114 X-RAY EXAM L-S SPINE BENDING: CPT

## 2019-11-13 PROCEDURE — 73521 X-RAY EXAM HIPS BI 2 VIEWS: CPT

## 2019-11-21 ENCOUNTER — TELEPHONE (OUTPATIENT)
Dept: INTERNAL MEDICINE CLINIC | Facility: CLINIC | Age: 44
End: 2019-11-21

## 2019-12-11 DIAGNOSIS — G47.26 SHIFT WORK SLEEP DISORDER: Primary | ICD-10-CM

## 2019-12-11 RX ORDER — MODAFINIL 200 MG/1
200 TABLET ORAL DAILY
Qty: 30 TABLET | Refills: 3 | Status: SHIPPED | OUTPATIENT
Start: 2019-12-11 | End: 2020-03-11 | Stop reason: ALTCHOICE

## 2020-02-29 DIAGNOSIS — F41.9 ANXIETY: ICD-10-CM

## 2020-03-02 RX ORDER — BUSPIRONE HYDROCHLORIDE 30 MG/1
TABLET ORAL
Qty: 270 TABLET | Refills: 0 | Status: SHIPPED | OUTPATIENT
Start: 2020-03-02 | End: 2020-03-11 | Stop reason: SDUPTHER

## 2020-03-11 ENCOUNTER — OFFICE VISIT (OUTPATIENT)
Dept: FAMILY MEDICINE CLINIC | Facility: CLINIC | Age: 45
End: 2020-03-11
Payer: COMMERCIAL

## 2020-03-11 VITALS
SYSTOLIC BLOOD PRESSURE: 112 MMHG | HEART RATE: 78 BPM | OXYGEN SATURATION: 97 % | RESPIRATION RATE: 16 BRPM | DIASTOLIC BLOOD PRESSURE: 72 MMHG | BODY MASS INDEX: 26.29 KG/M2 | TEMPERATURE: 98.7 F | WEIGHT: 154 LBS | HEIGHT: 64 IN

## 2020-03-11 DIAGNOSIS — G44.229 CHRONIC TENSION-TYPE HEADACHE, NOT INTRACTABLE: ICD-10-CM

## 2020-03-11 DIAGNOSIS — F41.9 ANXIETY: ICD-10-CM

## 2020-03-11 DIAGNOSIS — R41.840 ATTENTION AND CONCENTRATION DEFICIT: ICD-10-CM

## 2020-03-11 DIAGNOSIS — G47.19 EXCESSIVE DAYTIME SLEEPINESS: Primary | ICD-10-CM

## 2020-03-11 PROCEDURE — 1036F TOBACCO NON-USER: CPT | Performed by: FAMILY MEDICINE

## 2020-03-11 PROCEDURE — 3008F BODY MASS INDEX DOCD: CPT | Performed by: FAMILY MEDICINE

## 2020-03-11 PROCEDURE — 99214 OFFICE O/P EST MOD 30 MIN: CPT | Performed by: FAMILY MEDICINE

## 2020-03-11 RX ORDER — TOPIRAMATE 100 MG/1
TABLET, FILM COATED ORAL
Qty: 270 TABLET | Refills: 1 | Status: SHIPPED | OUTPATIENT
Start: 2020-03-11 | End: 2021-02-10 | Stop reason: SDUPTHER

## 2020-03-11 RX ORDER — TRAMADOL HYDROCHLORIDE 50 MG/1
TABLET ORAL
COMMUNITY
Start: 2016-06-08 | End: 2020-03-11 | Stop reason: ALTCHOICE

## 2020-03-11 RX ORDER — ATOMOXETINE 60 MG/1
60 CAPSULE ORAL DAILY
Qty: 90 CAPSULE | Refills: 1 | Status: SHIPPED | OUTPATIENT
Start: 2020-03-11 | End: 2021-08-23 | Stop reason: SDUPTHER

## 2020-03-11 RX ORDER — BUSPIRONE HYDROCHLORIDE 30 MG/1
30 TABLET ORAL 3 TIMES DAILY PRN
Qty: 180 TABLET | Refills: 0 | Status: SHIPPED | OUTPATIENT
Start: 2020-03-11 | End: 2020-03-12

## 2020-03-11 NOTE — PROGRESS NOTES
Standard Visit   Assessment/Plan:     Visit Diagnosis:  Diagnoses and all orders for this visit:    Excessive daytime sleepiness  -     Diagnostic Sleep Study; Future    Attention and concentration deficit  -     atoMOXetine (STRATTERA) 60 mg capsule; Take 1 capsule (60 mg total) by mouth daily    Anxiety  -     busPIRone (BUSPAR) 30 MG tablet; Take 1 tablet (30 mg total) by mouth 3 (three) times a day as needed (anxiety)    Chronic tension-type headache, not intractable  -     topiramate (TOPAMAX) 100 mg tablet; Take 1 pill in the morning and 2 pills at bedtime    Other orders  -     Discontinue: traMADol (ULTRAM) 50 mg tablet     we are going to get a sleep study   You are a very poor sleeper  Also you are falling asleep in the middle of the day several times just sitting there  Still,   you fall asleep  I am increasing the Topamax to 200 mg at bedtime  This will help with her headaches   this also might help you sleep   This also should help with some of your breakthrough anger issues    it will help with weight loss   you must drink a lot of water on this medication  I want to see you back after sleep study   we are going to hold on increasing Wellbutrin to 450   This will only  Worsen the anger issues  And also interfere with her sleep         Subjective:    Patient ID: April TAMI Sol is a 45 y o  female  Patient is here with the following concerns:    HPI    The following portions of the patient's history were reviewed and updated as appropriate: allergies, current medications, past family history, past medical history, past social history, past surgical history and problem list     Review of Systems   Psychiatric/Behavioral: Positive for decreased concentration           /72   Pulse 78   Temp 98 7 °F (37 1 °C) (Tympanic)   Resp 16   Ht 5' 4" (1 626 m)   Wt 69 9 kg (154 lb)   SpO2 97%   BMI 26 43 kg/m²   Social History     Socioeconomic History    Marital status: Single     Spouse name: Not on file    Number of children: 2    Years of education: Not on file    Highest education level: Not on file   Occupational History    Occupation: [employer]   Social Needs    Financial resource strain: Not on file    Food insecurity:     Worry: Not on file     Inability: Not on file    Transportation needs:     Medical: Not on file     Non-medical: Not on file   Tobacco Use    Smoking status: Never Smoker    Smokeless tobacco: Never Used   Substance and Sexual Activity    Alcohol use: No    Drug use: No    Sexual activity: Not on file   Lifestyle    Physical activity:     Days per week: Not on file     Minutes per session: Not on file    Stress: Not on file   Relationships    Social connections:     Talks on phone: Not on file     Gets together: Not on file     Attends Sabianist service: Not on file     Active member of club or organization: Not on file     Attends meetings of clubs or organizations: Not on file     Relationship status: Not on file    Intimate partner violence:     Fear of current or ex partner: Not on file     Emotionally abused: Not on file     Physically abused: Not on file     Forced sexual activity: Not on file   Other Topics Concern    Not on file   Social History Narrative    Daily coffee consumption     Past Medical History:   Diagnosis Date    Anxiety     Chronic pain disorder     lumbar radiculopathy    Depression     Headache      Family History   Problem Relation Age of Onset    Depression Mother     Breast cancer Maternal Grandmother     Cancer Family      Past Surgical History:   Procedure Laterality Date    CERVICAL BIOPSY  W/ LOOP ELECTRODE EXCISION      HERNIA REPAIR      HERNIA REPAIR Left 9/27/2017    Procedure: LAPAROSCOPIC INGUINAL HERNIA REPAIR WITH MESH;  Surgeon: Debbi Gannon MD;  Location: [facility];  Service: General    CO ESOPHAGOGASTRODUODENOSCOPY TRANSORAL DIAGNOSTIC N/A 2/24/2017    Procedure: EGD AND COLONOSCOPY;  Surgeon: Destinee Otto III, MD;  Location: [facility]; Service: Gastroenterology       Current Outpatient Medications:     atoMOXetine (STRATTERA) 60 mg capsule, Take 1 capsule (60 mg total) by mouth daily, Disp: 90 capsule, Rfl: 1    buPROPion (WELLBUTRIN XL) 300 mg 24 hr tablet, Take 1 tablet (300 mg total) by mouth daily (Patient not taking: Reported on 3/11/2020), Disp: 90 tablet, Rfl: 1    busPIRone (BUSPAR) 30 MG tablet, Take 1 tablet (30 mg total) by mouth 3 (three) times a day as needed (anxiety), Disp: 180 tablet, Rfl: 0    dexlansoprazole (DEXILANT) 60 MG capsule, Take 1 capsule (60 mg total) by mouth daily for 30 days, Disp: 90 capsule, Rfl: 1    topiramate (TOPAMAX) 100 mg tablet, Take 1 pill in the morning and 2 pills at bedtime, Disp: 270 tablet, Rfl: 1    Lab Review   No visits with results within 6 Month(s) from this visit  Latest known visit with results is:   Office Visit on 01/02/2019   Component Date Value     RAPID STREP A 01/02/2019 Positive*    INFLU A PCR 01/02/2019 None Detected     INFLU B PCR 01/02/2019 None Detected     RSV PCR 01/02/2019 None Detected         Objective:     Physical Exam   Constitutional: She is oriented to person, place, and time  She appears well-developed and well-nourished  HENT:   Head: Normocephalic and atraumatic  Eyes: Pupils are equal, round, and reactive to light  Neck: Normal range of motion  Neck supple  Cardiovascular: Normal rate  Pulmonary/Chest: Effort normal and breath sounds normal    Abdominal: Soft  Musculoskeletal: Normal range of motion  Neurological: She is alert and oriented to person, place, and time  Skin: Skin is warm and dry  Psychiatric: She has a normal mood and affect  She is agitated  Nursing note and vitals reviewed  There are no Patient Instructions on file for this visit          VAMSHI Rachel

## 2020-03-12 RX ORDER — BUSPIRONE HYDROCHLORIDE 30 MG/1
TABLET ORAL
Qty: 270 TABLET | Refills: 0 | Status: SHIPPED | OUTPATIENT
Start: 2020-03-12 | End: 2021-03-30 | Stop reason: ALTCHOICE

## 2020-04-08 ENCOUNTER — TELEPHONE (OUTPATIENT)
Dept: FAMILY MEDICINE CLINIC | Facility: CLINIC | Age: 45
End: 2020-04-08

## 2020-07-23 ENCOUNTER — OFFICE VISIT (OUTPATIENT)
Dept: FAMILY MEDICINE CLINIC | Facility: CLINIC | Age: 45
End: 2020-07-23
Payer: COMMERCIAL

## 2020-07-23 VITALS
TEMPERATURE: 97.8 F | BODY MASS INDEX: 27.14 KG/M2 | HEIGHT: 64 IN | HEART RATE: 62 BPM | WEIGHT: 159 LBS | OXYGEN SATURATION: 100 %

## 2020-07-23 DIAGNOSIS — K59.00 CONSTIPATION, UNSPECIFIED CONSTIPATION TYPE: ICD-10-CM

## 2020-07-23 DIAGNOSIS — Z12.31 ENCOUNTER FOR SCREENING MAMMOGRAM FOR BREAST CANCER: ICD-10-CM

## 2020-07-23 DIAGNOSIS — R10.31 RIGHT LOWER QUADRANT ABDOMINAL PAIN: Primary | ICD-10-CM

## 2020-07-23 PROCEDURE — 99214 OFFICE O/P EST MOD 30 MIN: CPT | Performed by: FAMILY MEDICINE

## 2020-07-23 PROCEDURE — 1036F TOBACCO NON-USER: CPT | Performed by: FAMILY MEDICINE

## 2020-07-23 PROCEDURE — 3008F BODY MASS INDEX DOCD: CPT | Performed by: FAMILY MEDICINE

## 2020-07-23 NOTE — PATIENT INSTRUCTIONS

## 2020-07-23 NOTE — PROGRESS NOTES
BMI Counseling: Body mass index is 27 29 kg/m²  The BMI is above normal  Nutrition recommendations include decreasing fast food intake, consuming healthier snacks, limiting drinks that contain sugar, increasing intake of lean protein and reducing intake of saturated and trans fat  Exercise recommendations include vigorous physical activity 75 minutes/week  No pharmacotherapy was ordered  Standard Visit   Assessment/Plan:     Visit Diagnosis:  Diagnoses and all orders for this visit:    Right lower quadrant abdominal pain  -     Ambulatory referral to General Surgery; Future    Constipation, unspecified constipation type  -     XR abdomen 1 view kub; Future    Encounter for screening mammogram for breast cancer  -     Mammo screening bilateral w cad; Future     KUB today   Follow-up with  surgeon to review the ultrasound and the CT scan   Please limit the heavy lifting work   You need your mammo done  Script entered into the chart        Subjective:    Patient ID: Ana M Esquivel is a 39 y o  female  Patient is here with the following concerns:     The abdominal pain that she was experiencing has returned   She had a CT scan that was done to evaluate the pain in the area of hernia repair that she had done prior  It showed a thickening in that area  She was seen by surgical consult  An ultrasound was done which showed no abnormalities no free air no new hernia  She did not follow for because of the [employer] pandemic   The area settle down but now that same pain is back   She wishes to follow-up now   She does do heavy lifting at work  She does work for a [employer]   It is quite strenuous work  She does have a history of a prior hernia repair   She denies any fever vomiting  diarrhea    HPI    The following portions of the patient's history were reviewed and updated as appropriate: allergies, current medications, past family history, past medical history, past social history, past surgical history and problem list     Review of Systems   Constitutional: Negative for chills and fever  Gastrointestinal: Positive for abdominal pain  Negative for blood in stool, diarrhea, rectal pain and vomiting  Genitourinary: Positive for dysuria  Musculoskeletal: Positive for back pain           Pulse 62   Temp 97 8 °F (36 6 °C)   Ht 5' 4" (1 626 m)   Wt 72 1 kg (159 lb)   SpO2 100%   BMI 27 29 kg/m²   Social History     Socioeconomic History    Marital status: Single     Spouse name: Not on file    Number of children: 2    Years of education: Not on file    Highest education level: Not on file   Occupational History    Occupation: [employer]   Social Needs    Financial resource strain: Not on file    Food insecurity:     Worry: Not on file     Inability: Not on file    Transportation needs:     Medical: Not on file     Non-medical: Not on file   Tobacco Use    Smoking status: Never Smoker    Smokeless tobacco: Never Used   Substance and Sexual Activity    Alcohol use: No    Drug use: No    Sexual activity: Not on file   Lifestyle    Physical activity:     Days per week: Not on file     Minutes per session: Not on file    Stress: Not on file   Relationships    Social connections:     Talks on phone: Not on file     Gets together: Not on file     Attends Restorationism service: Not on file     Active member of club or organization: Not on file     Attends meetings of clubs or organizations: Not on file     Relationship status: Not on file    Intimate partner violence:     Fear of current or ex partner: Not on file     Emotionally abused: Not on file     Physically abused: Not on file     Forced sexual activity: Not on file   Other Topics Concern    Not on file   Social History Narrative    Daily coffee consumption     Past Medical History:   Diagnosis Date    Anxiety     Chronic pain disorder     lumbar radiculopathy    Depression     Headache      Family History   Problem Relation Age of Onset    Depression Mother     Breast cancer Maternal Grandmother     Cancer Family      Past Surgical History:   Procedure Laterality Date    CERVICAL BIOPSY  W/ LOOP ELECTRODE EXCISION      HERNIA REPAIR      HERNIA REPAIR Left 9/27/2017    Procedure: LAPAROSCOPIC INGUINAL HERNIA REPAIR WITH MESH;  Surgeon: Radha Boone MD;  Location: [facility];  Service: General    IA ESOPHAGOGASTRODUODENOSCOPY TRANSORAL DIAGNOSTIC N/A 2/24/2017    Procedure: EGD AND COLONOSCOPY;  Surgeon: Shital Cota MD;  Location: [facility]; Service: Gastroenterology       Current Outpatient Medications:     atoMOXetine (STRATTERA) 60 mg capsule, Take 1 capsule (60 mg total) by mouth daily, Disp: 90 capsule, Rfl: 1    buPROPion (WELLBUTRIN XL) 300 mg 24 hr tablet, Take 1 tablet (300 mg total) by mouth daily (Patient not taking: Reported on 3/11/2020), Disp: 90 tablet, Rfl: 1    busPIRone (BUSPAR) 30 MG tablet, TAKE 1 TABLET BY MOUTH THREE TIMES A DAY AS NEEDED FOR ANXIETY, Disp: 270 tablet, Rfl: 0    dexlansoprazole (DEXILANT) 60 MG capsule, Take 1 capsule (60 mg total) by mouth daily for 30 days, Disp: 90 capsule, Rfl: 1    topiramate (TOPAMAX) 100 mg tablet, Take 1 pill in the morning and 2 pills at bedtime, Disp: 270 tablet, Rfl: 1    Lab Review   No visits with results within 6 Month(s) from this visit  Latest known visit with results is:   Office Visit on 01/02/2019   Component Date Value     RAPID STREP A 01/02/2019 Positive*    INFLU A PCR 01/02/2019 None Detected     INFLU B PCR 01/02/2019 None Detected     RSV PCR 01/02/2019 None Detected         Objective:     Physical Exam   Constitutional: She is oriented to person, place, and time  She appears well-developed and well-nourished  No distress  HENT:   Head: Normocephalic and atraumatic  Cardiovascular: Normal rate and regular rhythm  Pulmonary/Chest: Effort normal and breath sounds normal    Abdominal: Soft   Normal appearance and bowel sounds are normal  There is no hepatosplenomegaly  There is tenderness in the right lower quadrant  There is no rigidity, no guarding and no CVA tenderness  No hernia  Neurological: She is alert and oriented to person, place, and time  Skin: Skin is warm  Capillary refill takes less than 2 seconds  Psychiatric: She has a normal mood and affect   Her behavior is normal        Social History     Socioeconomic History    Marital status: Single     Spouse name: Not on file    Number of children: 2    Years of education: Not on file    Highest education level: Not on file   Occupational History    Occupation: [employer]   Social Needs    Financial resource strain: Not on file    Food insecurity:     Worry: Not on file     Inability: Not on file    Transportation needs:     Medical: Not on file     Non-medical: Not on file   Tobacco Use    Smoking status: Never Smoker    Smokeless tobacco: Never Used   Substance and Sexual Activity    Alcohol use: No    Drug use: No    Sexual activity: Not on file   Lifestyle    Physical activity:     Days per week: Not on file     Minutes per session: Not on file    Stress: Not on file   Relationships    Social connections:     Talks on phone: Not on file     Gets together: Not on file     Attends Sabianist service: Not on file     Active member of club or organization: Not on file     Attends meetings of clubs or organizations: Not on file     Relationship status: Not on file    Intimate partner violence:     Fear of current or ex partner: Not on file     Emotionally abused: Not on file     Physically abused: Not on file     Forced sexual activity: Not on file   Other Topics Concern    Not on file   Social History Narrative    Daily coffee consumption     Past Medical History:   Diagnosis Date    Anxiety     Chronic pain disorder     lumbar radiculopathy    Depression     Headache        Current Outpatient Medications:     atoMOXetine (STRATTERA) 60 mg capsule, Take 1 capsule (60 mg total) by mouth daily, Disp: 90 capsule, Rfl: 1    buPROPion (WELLBUTRIN XL) 300 mg 24 hr tablet, Take 1 tablet (300 mg total) by mouth daily (Patient not taking: Reported on 3/11/2020), Disp: 90 tablet, Rfl: 1    busPIRone (BUSPAR) 30 MG tablet, TAKE 1 TABLET BY MOUTH THREE TIMES A DAY AS NEEDED FOR ANXIETY, Disp: 270 tablet, Rfl: 0    dexlansoprazole (DEXILANT) 60 MG capsule, Take 1 capsule (60 mg total) by mouth daily for 30 days, Disp: 90 capsule, Rfl: 1    topiramate (TOPAMAX) 100 mg tablet, Take 1 pill in the morning and 2 pills at bedtime, Disp: 270 tablet, Rfl: 1  Family History   Problem Relation Age of Onset    Depression Mother     Breast cancer Maternal Grandmother     Cancer Family      Patient Instructions     Low Fat Diet   AMBULATORY CARE:   A low-fat diet  is an eating plan that is low in total fat, unhealthy fat, and cholesterol  You may need to follow a low-fat diet if you have trouble digesting or absorbing fat  You may also need to follow this diet if you have high cholesterol  You can also lower your cholesterol by increasing the amount of fiber in your diet  Soluble fiber is a type of fiber that helps to decrease cholesterol levels  Different types of fat in food:   · Limit unhealthy fats  A diet that is high in cholesterol, saturated fat, and trans fat may cause unhealthy cholesterol levels  Unhealthy cholesterol levels increase your risk of heart disease  ¨ Cholesterol:  Limit intake of cholesterol to less than 200 mg per day  Cholesterol is found in meat, eggs, and dairy  ¨ Saturated fat:  Limit saturated fat to less than 7% of your total daily calories  Ask your dietitian how many calories you need each day  Saturated fat is found in butter, cheese, ice cream, whole milk, and palm oil  Saturated fat is also found in meat, such as beef, pork, chicken skin, and processed meats  Processed meats include sausage, hot dogs, and bologna       ¨ Trans fat:  Avoid trans fat as much as possible  Trans fat is used in fried and baked foods  Foods that say trans fat free on the label may still have up to 0 5 grams of trans fat per serving  · Include healthy fats  Replace foods that are high in saturated and trans fat with foods high in healthy fats  This may help to decrease high cholesterol levels  ¨ Monounsaturated fats: These are found in avocados, nuts, and vegetable oils, such as olive, canola, and sunflower oil  ¨ Polyunsaturated fats: These can be found in vegetable oils, such as soybean or corn oil  Omega-3 fats can help to decrease the risk of heart disease  Omega-3 fats are found in fish, such as salmon, herring, trout, and tuna  Omega-3 fats can also be found in plant foods, such as walnuts, flaxseed, soybeans, and canola oil    Foods to limit or avoid:   · Grains:      ¨ Snacks that are made with partially hydrogenated oils, such as chips, regular crackers, and butter-flavored popcorn    ¨ High-fat baked goods, such as biscuits, croissants, doughnuts, pies, cookies, and pastries    · Dairy:      ¨ Whole milk, 2% milk, and yogurt and ice cream made with whole milk    ¨ Half and half creamer, heavy cream, and whipping cream    ¨ Cheese, cream cheese, and sour cream    · Meats and proteins:      ¨ High-fat cuts of meat (T-bone steak, regular hamburger, and ribs)    ¨ Fried meat, poultry (turkey and chicken), and fish    ¨ Poultry (chicken and turkey) with skin    ¨ Cold cuts (salami or bologna), hot dogs, rosario, and sausage    ¨ Whole eggs and egg yolks    · Vegetables and fruits with added fat:      ¨ Fried vegetables or vegetables in butter or high-fat sauces, such as cream or cheese sauces    ¨ Fried fruit or fruit served with butter or cream    · Fats:      ¨ Butter, stick margarine, and shortening    ¨ Coconut, palm oil, and palm kernel oil  Foods to include:   · Grains:      ¨ Whole-grain breads, cereals, pasta, and brown rice    ¨ Low-fat crackers and pretzels    · Vegetables and fruits:      ¨ Fresh, frozen, or canned vegetables (no salt or low-sodium)    ¨ Fresh, frozen, dried, or canned fruit (canned in light syrup or fruit juice)    ¨ Avocado    · Low-fat dairy products:      ¨ Nonfat (skim) or 1% milk    ¨ Nonfat or low-fat cheese, yogurt, and cottage cheese    · Meats and proteins:      ¨ Chicken or turkey with no skin    ¨ Baked or broiled fish    ¨ Lean beef and pork (loin, round, extra lean hamburger)    ¨ Beans and peas, unsalted nuts, soy products    ¨ Egg whites and substitutes    ¨ Seeds and nuts    · Fats:      ¨ Unsaturated oil, such as canola, olive, peanut, soybean, or sunflower oil    ¨ Soft or liquid margarine and vegetable oil spread    ¨ Low-fat salad dressing  Other ways to decrease fat:   · Read food labels before you buy foods  Choose foods that have less than 30% of calories from fat  Choose low-fat or fat-free dairy products  Remember that fat free does not mean calorie free  These foods still contain calories, and too many calories can lead to weight gain  · Trim fat from meat and avoid fried food  Trim all visible fat from meat before you cook it  Remove the skin from poultry  Do not grace meat, fish, or poultry  Bake, roast, boil, or broil these foods instead  Avoid fried foods  Eat a baked potato instead of Western [place] fries  Steam vegetables instead of sautéing them in butter  · Add less fat to foods  Use imitation rosario bits on salads and baked potatoes instead of regular rosario bits  Use fat-free or low-fat salad dressings instead of regular dressings  Use low-fat or nonfat butter-flavored topping instead of regular butter or margarine on popcorn and other foods  Ways to decrease fat in recipes:  Replace high-fat ingredients with low-fat or nonfat ones  This may cause baked goods to be drier than usual  You may need to use nonfat cooking spray on pans to prevent food from sticking   You also may need to change the amount of other ingredients, such as water, in the recipe  Try the following:  · Use low-fat or light margarine instead of regular margarine or shortening  · Use lean ground turkey breast or chicken, or lean ground beef (less than 5% fat) instead of hamburger  · Add 1 teaspoon of canola oil to 8 ounces of skim milk instead of using cream or half and half  · Use grated zucchini, carrots, or apples in breads instead of coconut  · Use blenderized, low-fat cottage cheese, plain tofu, or low-fat ricotta cheese instead of cream cheese  · Use 1 egg white and 1 teaspoon of canola oil, or use ¼ cup (2 ounces) of fat-free egg substitute instead of a whole egg  · Replace half of the oil that is called for in a recipe with applesauce when you bake  Use 3 tablespoons of cocoa powder and 1 tablespoon of canola oil instead of a square of baking chocolate  How to increase fiber:  Eat enough high-fiber foods to get 20 to 30 grams of fiber every day  Slowly increase your fiber intake to avoid stomach cramps, gas, and other problems  · Eat 3 ounces of whole-grain foods each day  An ounce is about 1 slice of bread  Eat whole-grain breads, such as whole-wheat bread  Whole wheat, whole-wheat flour, or other whole grains should be listed as the first ingredient on the food label  Replace white flour with whole-grain flour or use half of each in recipes  Whole-grain flour is heavier than white flour, so you may have to add more yeast or baking powder  · Eat a high-fiber cereal for breakfast   Oatmeal is a good source of soluble fiber  Look for cereals that have bran or fiber in the name  Choose whole-grain products, such as brown rice, barley, and whole-wheat pasta  · Eat more beans, peas, and lentils  For example, add beans to soups or salads  Eat at least 5 cups of fruits and vegetables each day  Eat fruits and vegetables with the peel because the peel is high in fiber    © 2017 [facility] [facility] Information is for End User's use only and may not be sold, redistributed or otherwise used for commercial purposes  All illustrations and images included in CareNotes® are the copyrighted property of A D A M , [employer]  or [place]  The above information is an  only  It is not intended as medical advice for individual conditions or treatments  Talk to your doctor, nurse or pharmacist before following any medical regimen to see if it is safe and effective for you  Heart Healthy Diet   AMBULATORY CARE:   A heart healthy diet  is an eating plan low in total fat, unhealthy fats, and sodium (salt)  A heart healthy diet helps decrease your risk for heart disease and stroke  Limit the amount of fat you eat to 25% to 35% of your total daily calories  Limit sodium to less than 2,300 mg each day  Healthy fats:  Healthy fats can help improve cholesterol levels  The risk for heart disease is decreased when cholesterol levels are normal  Choose healthy fats, such as the following:  · Unsaturated fat  is found in foods such as soybean, canola, olive, corn, and safflower oils  It is also found in soft tub margarine that is made with liquid vegetable oil  · Omega-3 fat  is found in certain fish, such as salmon, tuna, and trout, and in walnuts and flaxseed  Unhealthy fats:  Unhealthy fats can cause unhealthy cholesterol levels in your blood and increase your risk of heart disease  Limit unhealthy fats, such as the following:  · Cholesterol  is found in animal foods, such as eggs and lobster, and in dairy products made from whole milk  Limit cholesterol to less than 300 milligrams (mg) each day  You may need to limit cholesterol to 200 mg each day if you have heart disease  · Saturated fat  is found in meats, such as rosario and hamburger  It is also found in chicken or turkey skin, whole milk, and butter  Limit saturated fat to less than 7% of your total daily calories   Limit saturated fat to less than 6% if you have heart disease or are at increased risk for it  · Trans fat  is found in packaged foods, such as potato chips and cookies  It is also in hard margarine, some fried foods, and shortening  Avoid trans fats as much as possible    Heart healthy foods and drinks to include:  Ask your dietitian or healthcare provider how many servings to have from each of the following food groups:  · Grains:      ¨ Whole-wheat breads, cereals, and pastas, and brown rice    ¨ Low-fat, low-sodium crackers and chips    · Vegetables:      ¨ Broccoli, green beans, green peas, and spinach    ¨ Collards, kale, and lima beans    ¨ Carrots, sweet potatoes, tomatoes, and peppers    ¨ Canned vegetables with no salt added    · Fruits:      ¨ Bananas, peaches, pears, and pineapple    ¨ Grapes, raisins, and dates    ¨ Oranges, tangerines, grapefruit, orange juice, and grapefruit juice    ¨ Apricots, mangoes, melons, and papaya    ¨ Raspberries and strawberries    ¨ Canned fruit with no added sugar    · Low-fat dairy products:      ¨ Nonfat (skim) milk, 1% milk, and low-fat almond, cashew, or soy milks fortified with calcium    ¨ Low-fat cheese, regular or frozen yogurt, and cottage cheese    · Meats and proteins , such as lean cuts of beef and pork (loin, leg, round), skinless chicken and turkey, legumes, soy products, egg whites, and nuts  Foods and drinks to limit or avoid:  Ask your dietitian or healthcare provider about these and other foods that are high in unhealthy fat, sodium, and sugar:  · Snack or packaged foods , such as frozen dinners, cookies, macaroni and cheese, and cereals with more than 300 mg of sodium per serving    · Canned or dry mixes  for cakes, soups, sauces, or gravies    · Vegetables with added sodium , such as instant potatoes, vegetables with added sauces, or regular canned vegetables    · Other foods high in sodium , such as ketchup, barbecue sauce, salad dressing, pickles, olives, soy sauce, and miso    · High-fat dairy foods  such as whole or 2% milk, cream cheese, or sour cream, and cheeses     · High-fat protein foods  such as high-fat cuts of beef (T-bone steaks, ribs), chicken or turkey with skin, and organ meats, such as liver    · Cured or smoked meats , such as hot dogs, rosario, and sausage    · Unhealthy fats and oils , such as butter, stick margarine, shortening, and cooking oils such as coconut or palm oil    · Food and drinks high in sugar , such as soft drinks (soda), sports drinks, sweetened tea, candy, cake, cookies, pies, and doughnuts  Other diet guidelines to follow:   · Eat more foods containing omega-3 fats  Eat fish high in omega-3 fats at least 2 times a week  · Limit alcohol  Too much alcohol can damage your heart and raise your blood pressure  Women should limit alcohol to 1 drink a day  Men should limit alcohol to 2 drinks a day  A drink of alcohol is 12 ounces of beer, 5 ounces of wine, or 1½ ounces of liquor  · Choose low-sodium foods  High-sodium foods can lead to high blood pressure  Add little or no salt to food you prepare  Use herbs and spices in place of salt  · Eat more fiber  to help lower cholesterol levels  Eat at least 5 servings of fruits and vegetables each day  Eat 3 ounces of whole-grain foods each day  Legumes (beans) are also a good source of fiber  Lifestyle guidelines:   · Do not smoke  Nicotine and other chemicals in cigarettes and cigars can cause lung and heart damage  Ask your healthcare provider for information if you currently smoke and need help to quit  E-cigarettes or smokeless tobacco still contain nicotine  Talk to your healthcare provider before you use these products  · Exercise regularly  to help you maintain a healthy weight and improve your blood pressure and cholesterol levels  Ask your healthcare provider about the best exercise plan for you  Do not start an exercise program without asking your healthcare provider     Follow up with your healthcare provider as directed:  Write down your questions so you remember to ask them during your visits  © 2017 [address] [employer] Information is for End User's use only and may not be sold, redistributed or otherwise used for commercial purposes  All illustrations and images included in CareNotes® are the copyrighted property of A D A M , Inc  or [place]  The above information is an  only  It is not intended as medical advice for individual conditions or treatments  Talk to your doctor, nurse or pharmacist before following any medical regimen to see if it is safe and effective for you  VAMSHI Chaves  BMI Counseling: Body mass index is 27 29 kg/m²  The BMI is above normal  Nutrition recommendations include reducing portion sizes, decreasing overall calorie intake, 3-5 servings of fruits/vegetables daily, reducing fast food intake, consuming healthier snacks, decreasing soda and/or juice intake, moderation in carbohydrate intake, increasing intake of lean protein, reducing intake of saturated fat and trans fat and reducing intake of cholesterol

## 2020-07-27 ENCOUNTER — APPOINTMENT (OUTPATIENT)
Dept: RADIOLOGY | Facility: CLINIC | Age: 45
End: 2020-07-27
Payer: COMMERCIAL

## 2020-07-27 DIAGNOSIS — K59.00 CONSTIPATION, UNSPECIFIED CONSTIPATION TYPE: ICD-10-CM

## 2020-07-27 PROCEDURE — 74018 RADEX ABDOMEN 1 VIEW: CPT

## 2020-08-05 ENCOUNTER — TELEPHONE (OUTPATIENT)
Dept: FAMILY MEDICINE CLINIC | Facility: CLINIC | Age: 45
End: 2020-08-05

## 2020-08-05 DIAGNOSIS — K59.04 CHRONIC IDIOPATHIC CONSTIPATION: Primary | ICD-10-CM

## 2020-08-05 RX ORDER — LACTULOSE 20 G/30ML
SOLUTION ORAL
Qty: 473 ML | Refills: 1 | Status: SHIPPED | OUTPATIENT
Start: 2020-08-05 | End: 2021-03-30 | Stop reason: ALTCHOICE

## 2020-08-05 NOTE — TELEPHONE ENCOUNTER
Xray shows extreme constipation,  Large amt of retained stool within colon  You can have regular bowel movements and still have retained fecal contents "stuck" within bowel  This will cause sharp stabbing gas pains and dull bloating aching belly pain  She needs to start mirlax one capful in full glass of juice daily for one full week  Also I want her to take lactulose one tablespoon 2 times a day to help get things going  The lactulose has been called in

## 2020-11-04 ENCOUNTER — TELEMEDICINE (OUTPATIENT)
Dept: FAMILY MEDICINE CLINIC | Facility: CLINIC | Age: 45
End: 2020-11-04
Payer: COMMERCIAL

## 2020-11-04 DIAGNOSIS — J02.9 SORE THROAT: Primary | ICD-10-CM

## 2020-11-04 DIAGNOSIS — J32.0 CHRONIC MAXILLARY SINUSITIS: ICD-10-CM

## 2020-11-04 PROCEDURE — 99214 OFFICE O/P EST MOD 30 MIN: CPT | Performed by: FAMILY MEDICINE

## 2020-11-04 PROCEDURE — 1036F TOBACCO NON-USER: CPT | Performed by: FAMILY MEDICINE

## 2020-11-04 RX ORDER — AZITHROMYCIN 250 MG/1
TABLET, FILM COATED ORAL
Qty: 6 TABLET | Refills: 0 | Status: SHIPPED | OUTPATIENT
Start: 2020-11-04 | End: 2020-11-08

## 2020-11-05 ENCOUNTER — TELEPHONE (OUTPATIENT)
Dept: FAMILY MEDICINE CLINIC | Facility: CLINIC | Age: 45
End: 2020-11-05

## 2020-11-05 DIAGNOSIS — Z20.828 EXPOSURE TO SARS-ASSOCIATED CORONAVIRUS: Primary | ICD-10-CM

## 2020-11-06 DIAGNOSIS — Z20.828 EXPOSURE TO SARS-ASSOCIATED CORONAVIRUS: ICD-10-CM

## 2020-11-06 PROCEDURE — U0003 INFECTIOUS AGENT DETECTION BY NUCLEIC ACID (DNA OR RNA); SEVERE ACUTE RESPIRATORY SYNDROME CORONAVIRUS 2 (SARS-COV-2) (CORONAVIRUS DISEASE [COVID-19]), AMPLIFIED PROBE TECHNIQUE, MAKING USE OF HIGH THROUGHPUT TECHNOLOGIES AS DESCRIBED BY CMS-2020-01-R: HCPCS | Performed by: FAMILY MEDICINE

## 2020-11-08 LAB — SARS-COV-2 RNA SPEC QL NAA+PROBE: NOT DETECTED

## 2021-01-27 ENCOUNTER — TELEPHONE (OUTPATIENT)
Dept: FAMILY MEDICINE CLINIC | Facility: CLINIC | Age: 46
End: 2021-01-27

## 2021-01-27 DIAGNOSIS — F34.1 DYSTHYMIA: ICD-10-CM

## 2021-01-27 RX ORDER — BUPROPION HYDROCHLORIDE 300 MG/1
300 TABLET ORAL DAILY
Qty: 90 TABLET | Refills: 1 | Status: SHIPPED | OUTPATIENT
Start: 2021-01-27 | End: 2021-02-10

## 2021-01-27 NOTE — TELEPHONE ENCOUNTER
Called patient  Unable to leave message   Provigil script can only be filled if she is working a night shift per [employer]

## 2021-02-10 ENCOUNTER — OFFICE VISIT (OUTPATIENT)
Dept: FAMILY MEDICINE CLINIC | Facility: CLINIC | Age: 46
End: 2021-02-10
Payer: COMMERCIAL

## 2021-02-10 VITALS
DIASTOLIC BLOOD PRESSURE: 84 MMHG | HEART RATE: 92 BPM | TEMPERATURE: 98.6 F | SYSTOLIC BLOOD PRESSURE: 120 MMHG | BODY MASS INDEX: 30.05 KG/M2 | WEIGHT: 176 LBS | RESPIRATION RATE: 18 BRPM | OXYGEN SATURATION: 99 % | HEIGHT: 64 IN

## 2021-02-10 DIAGNOSIS — G44.219 EPISODIC TENSION-TYPE HEADACHE, NOT INTRACTABLE: ICD-10-CM

## 2021-02-10 DIAGNOSIS — R63.5 WEIGHT GAIN: ICD-10-CM

## 2021-02-10 DIAGNOSIS — Z12.31 ENCOUNTER FOR MAMMOGRAM TO ESTABLISH BASELINE MAMMOGRAM: ICD-10-CM

## 2021-02-10 DIAGNOSIS — F33.9 EPISODE OF RECURRENT MAJOR DEPRESSIVE DISORDER, UNSPECIFIED DEPRESSION EPISODE SEVERITY (HCC): ICD-10-CM

## 2021-02-10 DIAGNOSIS — F33.9 EPISODE OF RECURRENT MAJOR DEPRESSIVE DISORDER, UNSPECIFIED DEPRESSION EPISODE SEVERITY (HCC): Primary | ICD-10-CM

## 2021-02-10 PROCEDURE — 99214 OFFICE O/P EST MOD 30 MIN: CPT | Performed by: FAMILY MEDICINE

## 2021-02-10 PROCEDURE — 1036F TOBACCO NON-USER: CPT | Performed by: FAMILY MEDICINE

## 2021-02-10 RX ORDER — BUPROPION HYDROCHLORIDE 450 MG/1
450 TABLET, FILM COATED, EXTENDED RELEASE ORAL DAILY
Qty: 90 TABLET | Refills: 1 | Status: SHIPPED | OUTPATIENT
Start: 2021-02-10 | End: 2021-03-30 | Stop reason: ALTCHOICE

## 2021-02-10 RX ORDER — PHENTERMINE HYDROCHLORIDE 37.5 MG/1
37.5 CAPSULE ORAL EVERY MORNING
Qty: 30 CAPSULE | Refills: 1 | Status: SHIPPED | OUTPATIENT
Start: 2021-02-10 | End: 2021-04-07 | Stop reason: SDUPTHER

## 2021-02-10 RX ORDER — TOPIRAMATE 100 MG/1
TABLET, FILM COATED ORAL
Qty: 180 TABLET | Refills: 1 | Status: SHIPPED | OUTPATIENT
Start: 2021-02-10 | End: 2021-08-20 | Stop reason: ALTCHOICE

## 2021-02-10 NOTE — PROGRESS NOTES
BMI Counseling: Body mass index is 30 21 kg/m²  The BMI is above normal  Nutrition recommendations include consuming healthier snacks, limiting drinks that contain sugar and increasing intake of lean protein  Exercise recommendations include vigorous physical activity 75 minutes/week, obtaining a gym membership and strength training exercises  Standard Visit   Assessment/Plan:     Visit Diagnosis:  Diagnoses and all orders for this visit:    Episode of recurrent major depressive disorder, unspecified depression episode severity (HCC)  -     buPROPion (FORFIVO XL) 450 MG 24 hr tablet; Take 1 tablet (450 mg total) by mouth daily    Episodic tension-type headache, not intractable  -     topiramate (TOPAMAX) 100 mg tablet; Take 1 pill twice a day    Weight gain  -     phentermine 37 5 MG capsule; Take 1 capsule (37 5 mg total) by mouth every morning          Subjective:    Patient ID: April M Feli Sarabia is a [place] 48 y o  female  Patient is here with the following concerns:     Here for checkup   Worried about her weight   Has gained weight over the COVID and do past surgeries  Wants to a program to help her lose some weight         The following portions of the patient's history were reviewed and updated as appropriate: allergies, current medications, past family history, past medical history, past social history, past surgical history and problem list     Review of Systems   Constitutional: Negative for activity change and fever  HENT: Negative for nosebleeds and trouble swallowing  Eyes: Negative  Respiratory: Negative  Cardiovascular: Negative for palpitations  Gastrointestinal: Negative for abdominal pain, blood in stool and vomiting  Endocrine: Negative for cold intolerance  Genitourinary: Negative  Musculoskeletal: Negative for neck stiffness  Skin: Negative for pallor  Allergic/Immunologic: Negative for immunocompromised state     Neurological: Negative for seizures and facial asymmetry  Hematological: Negative for adenopathy  Psychiatric/Behavioral: Negative for agitation and suicidal ideas           /84   Pulse 92   Temp 98 6 °F (37 °C) (Tympanic)   Resp 18   Ht 5' 4" (1 626 m)   Wt 79 8 kg (176 lb)   SpO2 99%   BMI 30 21 kg/m²   Social History     Socioeconomic History    Marital status: Single     Spouse name: Not on file    Number of children: 2    Years of education: Not on file    Highest education level: Not on file   Occupational History    Occupation: [employer]   Social Needs    Financial resource strain: Not on file    Food insecurity     Worry: Not on file     Inability: Not on file   [employer] needs     Medical: Not on file     Non-medical: Not on file   Tobacco Use    Smoking status: Never Smoker    Smokeless tobacco: Never Used   Substance and Sexual Activity    Alcohol use: No    Drug use: No    Sexual activity: Not on file   Lifestyle    Physical activity     Days per week: Not on file     Minutes per session: Not on file    Stress: Not on file   Relationships    Social connections     Talks on phone: Not on file     Gets together: Not on file     Attends Judaism service: Not on file     Active member of club or organization: Not on file     Attends meetings of clubs or organizations: Not on file     Relationship status: Not on file    Intimate partner violence     Fear of current or ex partner: Not on file     Emotionally abused: Not on file     Physically abused: Not on file     Forced sexual activity: Not on file   Other Topics Concern    Not on file   Social History Narrative    Daily coffee consumption     Past Medical History:   Diagnosis Date    Anxiety     Chronic pain disorder     lumbar radiculopathy    Depression     Headache      Family History   Problem Relation Age of Onset    Depression Mother     Breast cancer Maternal Grandmother     Cancer Family      Past Surgical History:   Procedure Laterality Date    CERVICAL BIOPSY  W/ LOOP ELECTRODE EXCISION      HERNIA REPAIR      HERNIA REPAIR Left 9/27/2017    Procedure: LAPAROSCOPIC INGUINAL HERNIA REPAIR WITH MESH;  Surgeon: Presley Burns MD;  Location: [facility];  Service: General    IN ESOPHAGOGASTRODUODENOSCOPY TRANSORAL DIAGNOSTIC N/A 2/24/2017    Procedure: EGD AND COLONOSCOPY;  Surgeon: Namrata Funes MD;  Location: [facility]; Service: Gastroenterology       Current Outpatient Medications:     atoMOXetine (STRATTERA) 60 mg capsule, Take 1 capsule (60 mg total) by mouth daily, Disp: 90 capsule, Rfl: 1    busPIRone (BUSPAR) 30 MG tablet, TAKE 1 TABLET BY MOUTH THREE TIMES A DAY AS NEEDED FOR ANXIETY, Disp: 270 tablet, Rfl: 0    lactulose 20 g/30 mL, 30 cc twice daily prn constipation, Disp: 473 mL, Rfl: 1    buPROPion (FORFIVO XL) 450 MG 24 hr tablet, Take 1 tablet (450 mg total) by mouth daily, Disp: 90 tablet, Rfl: 1    dexlansoprazole (DEXILANT) 60 MG capsule, Take 1 capsule (60 mg total) by mouth daily for 30 days, Disp: 90 capsule, Rfl: 1    phentermine 37 5 MG capsule, Take 1 capsule (37 5 mg total) by mouth every morning, Disp: 30 capsule, Rfl: 1    topiramate (TOPAMAX) 100 mg tablet, Take 1 pill twice a day, Disp: 180 tablet, Rfl: 1    Lab Review   Orders Only on 11/06/2020   Component Date Value    SARS-CoV-2  11/06/2020 Not Detected         Objective:     Physical Exam  Vitals signs and nursing note reviewed  Constitutional:       Appearance: She is well-developed  HENT:      Head: Normocephalic and atraumatic  Eyes:      Pupils: Pupils are equal, round, and reactive to light  Neck:      Musculoskeletal: Normal range of motion and neck supple  Cardiovascular:      Rate and Rhythm: Normal rate  Pulmonary:      Effort: Pulmonary effort is normal       Breath sounds: Normal breath sounds  Abdominal:      Palpations: Abdomen is soft  Musculoskeletal: Normal range of motion  Skin:     General: Skin is warm and dry  Neurological:      Mental Status: She is alert and oriented to person, place, and time           Social History     Socioeconomic History    Marital status: Single     Spouse name: Not on file    Number of children: 2    Years of education: Not on file    Highest education level: Not on file   Occupational History    Occupation: [employer]   Social Needs    Financial resource strain: Not on file    Food insecurity     Worry: Not on file     Inability: Not on file   [employer] needs     Medical: Not on file     Non-medical: Not on file   Tobacco Use    Smoking status: Never Smoker    Smokeless tobacco: Never Used   Substance and Sexual Activity    Alcohol use: No    Drug use: No    Sexual activity: Not on file   Lifestyle    Physical activity     Days per week: Not on file     Minutes per session: Not on file    Stress: Not on file   Relationships    Social connections     Talks on phone: Not on file     Gets together: Not on file     Attends Druze service: Not on file     Active member of club or organization: Not on file     Attends meetings of clubs or organizations: Not on file     Relationship status: Not on file    Intimate partner violence     Fear of current or ex partner: Not on file     Emotionally abused: Not on file     Physically abused: Not on file     Forced sexual activity: Not on file   Other Topics Concern    Not on file   Social History Narrative    Daily coffee consumption     Past Medical History:   Diagnosis Date    Anxiety     Chronic pain disorder     lumbar radiculopathy    Depression     Headache        Current Outpatient Medications:     atoMOXetine (STRATTERA) 60 mg capsule, Take 1 capsule (60 mg total) by mouth daily, Disp: 90 capsule, Rfl: 1    busPIRone (BUSPAR) 30 MG tablet, TAKE 1 TABLET BY MOUTH THREE TIMES A DAY AS NEEDED FOR ANXIETY, Disp: 270 tablet, Rfl: 0    lactulose 20 g/30 mL, 30 cc twice daily prn constipation, Disp: 473 mL, Rfl: 1    buPROPion (FORFIVO XL) 450 MG 24 hr tablet, Take 1 tablet (450 mg total) by mouth daily, Disp: 90 tablet, Rfl: 1    dexlansoprazole (DEXILANT) 60 MG capsule, Take 1 capsule (60 mg total) by mouth daily for 30 days, Disp: 90 capsule, Rfl: 1    phentermine 37 5 MG capsule, Take 1 capsule (37 5 mg total) by mouth every morning, Disp: 30 capsule, Rfl: 1    topiramate (TOPAMAX) 100 mg tablet, Take 1 pill twice a day, Disp: 180 tablet, Rfl: 1  Family History   Problem Relation Age of Onset    Depression Mother     Breast cancer Maternal Grandmother     Cancer Family      There are no Patient Instructions on file for this visit          VAMSHI Brandon

## 2021-02-11 DIAGNOSIS — F33.9 EPISODE OF RECURRENT MAJOR DEPRESSIVE DISORDER, UNSPECIFIED DEPRESSION EPISODE SEVERITY (HCC): ICD-10-CM

## 2021-02-11 DIAGNOSIS — F32.A DEPRESSION, UNSPECIFIED DEPRESSION TYPE: Primary | ICD-10-CM

## 2021-02-11 RX ORDER — BUPROPION HYDROCHLORIDE 450 MG/1
450 TABLET, FILM COATED, EXTENDED RELEASE ORAL DAILY
Qty: 90 TABLET | Refills: 1 | OUTPATIENT
Start: 2021-02-11

## 2021-02-11 RX ORDER — BUPROPION HYDROCHLORIDE 300 MG/1
300 TABLET ORAL EVERY MORNING
Qty: 90 TABLET | Refills: 1 | Status: SHIPPED | OUTPATIENT
Start: 2021-02-11 | End: 2021-08-23 | Stop reason: SDUPTHER

## 2021-02-11 RX ORDER — BUPROPION HYDROCHLORIDE 150 MG/1
150 TABLET ORAL EVERY MORNING
Qty: 90 TABLET | Refills: 1 | Status: SHIPPED | OUTPATIENT
Start: 2021-02-11 | End: 2021-08-20

## 2021-03-30 ENCOUNTER — OFFICE VISIT (OUTPATIENT)
Dept: FAMILY MEDICINE CLINIC | Facility: CLINIC | Age: 46
End: 2021-03-30
Payer: COMMERCIAL

## 2021-03-30 VITALS
DIASTOLIC BLOOD PRESSURE: 80 MMHG | HEART RATE: 80 BPM | SYSTOLIC BLOOD PRESSURE: 120 MMHG | HEIGHT: 64 IN | OXYGEN SATURATION: 99 % | TEMPERATURE: 98.2 F | WEIGHT: 171 LBS | BODY MASS INDEX: 29.19 KG/M2

## 2021-03-30 DIAGNOSIS — Z00.00 HEALTHCARE MAINTENANCE: Primary | ICD-10-CM

## 2021-03-30 DIAGNOSIS — R63.5 WEIGHT GAIN: ICD-10-CM

## 2021-03-30 DIAGNOSIS — F34.1 DYSTHYMIA: ICD-10-CM

## 2021-03-30 DIAGNOSIS — F41.9 ANXIETY: ICD-10-CM

## 2021-03-30 PROCEDURE — 3008F BODY MASS INDEX DOCD: CPT | Performed by: FAMILY MEDICINE

## 2021-03-30 PROCEDURE — 99214 OFFICE O/P EST MOD 30 MIN: CPT | Performed by: NURSE PRACTITIONER

## 2021-03-30 RX ORDER — HYDROXYZINE PAMOATE 100 MG/1
100 CAPSULE ORAL 3 TIMES DAILY PRN
Qty: 30 CAPSULE | Refills: 0 | Status: SHIPPED | OUTPATIENT
Start: 2021-03-30 | End: 2021-08-23 | Stop reason: SDUPTHER

## 2021-03-30 NOTE — ASSESSMENT & PLAN NOTE
Advised to obtain fasting labs  Advised to continue phentermine  We discussed saxenda if patient has impaired fasting glucose  We did discuss possibly seeing weight management  She did lose 5 lbs in the last 6 weeks  Advised low-calorie diet

## 2021-03-30 NOTE — PROGRESS NOTES
Assessment/Plan:     Chronic Problems:  Weight gain  Advised to obtain fasting labs  Advised to continue phentermine  We discussed saxenda if patient has impaired fasting glucose  We did discuss possibly seeing weight management  She did lose 5 lbs in the last 6 weeks  Advised low-calorie diet  Dysthymia    Continue Wellbutrin as this seems to be working well for her  Anxiety    Will start patient on Vistaril as needed  Visit Diagnosis:  Diagnoses and all orders for this visit:    Healthcare maintenance  -     CBC and differential; Future  -     Comprehensive metabolic panel; Future  -     Hemoglobin A1C; Future  -     Lipid panel; Future  -     TSH, 3rd generation; Future    Anxiety  -     hydrOXYzine (VISTARIL) 100 MG capsule; Take 1 capsule (100 mg total) by mouth 3 (three) times a day as needed for anxiety    Weight gain    Dysthymia          Subjective:    Patient ID: Ana M Zarate Beaver is a 39 y o  female  Patient presents for follow up on phentermine and weight loss  Patient feels phentermine is not working well for her, but she did lose 5 pounds in the last 6 weeks since starting the med  Goal weight is 140lbs  She does not feel the phentermine is suppressing her appetite enough  She continues with increase anxiety  She feels her depression is stable with wellbutrin currently  Patient does struggle with fatigue as she works early shifts at [employer]  She did use provigil prior to help with fatigue  The following portions of the patient's history were reviewed and updated as appropriate: allergies, current medications, past family history, past medical history, past social history, past surgical history and problem list     Review of Systems   Constitutional: Negative for chills and fever  HENT: Negative for ear pain and sore throat  Eyes: Negative for pain and visual disturbance  Respiratory: Negative for cough and shortness of breath      Cardiovascular: Negative for chest pain and palpitations  Gastrointestinal: Negative for abdominal pain and vomiting  Genitourinary: Negative for dysuria and hematuria  Musculoskeletal: Negative for arthralgias and back pain  Skin: Negative for color change and rash  Neurological: Negative for seizures and syncope  Psychiatric/Behavioral: Positive for dysphoric mood and sleep disturbance  The patient is nervous/anxious  All other systems reviewed and are negative          /80   Pulse 80   Temp 98 2 °F (36 8 °C)   Ht 5' 4" (1 626 m)   Wt 77 6 kg (171 lb)   SpO2 99%   BMI 29 35 kg/m²   Social History     Socioeconomic History    Marital status: Single     Spouse name: Not on file    Number of children: 2    Years of education: Not on file    Highest education level: Not on file   Occupational History    Occupation: [employer]   Social Needs    Financial resource strain: Not on file    Food insecurity     Worry: Not on file     Inability: Not on file   [employer] needs     Medical: Not on file     Non-medical: Not on file   Tobacco Use    Smoking status: Never Smoker    Smokeless tobacco: Never Used   Substance and Sexual Activity    Alcohol use: No    Drug use: No    Sexual activity: Not on file   Lifestyle    Physical activity     Days per week: Not on file     Minutes per session: Not on file    Stress: Not on file   Relationships    Social connections     Talks on phone: Not on file     Gets together: Not on file     Attends Adventist service: Not on file     Active member of club or organization: Not on file     Attends meetings of clubs or organizations: Not on file     Relationship status: Not on file    Intimate partner violence     Fear of current or ex partner: Not on file     Emotionally abused: Not on file     Physically abused: Not on file     Forced sexual activity: Not on file   Other Topics Concern    Not on file   Social History Narrative    Daily coffee consumption     Past Medical History: Diagnosis Date    Anxiety     Chronic pain disorder     lumbar radiculopathy    Depression     Headache      Family History   Problem Relation Age of Onset    Depression Mother     Breast cancer Maternal Grandmother     Cancer Family      Past Surgical History:   Procedure Laterality Date    CERVICAL BIOPSY  W/ LOOP ELECTRODE EXCISION      HERNIA REPAIR      HERNIA REPAIR Left 9/27/2017    Procedure: LAPAROSCOPIC INGUINAL HERNIA REPAIR WITH MESH;  Surgeon: Sudhakar Swanson MD;  Location: [facility];  Service: General    ME ESOPHAGOGASTRODUODENOSCOPY TRANSORAL DIAGNOSTIC N/A 2/24/2017    Procedure: EGD AND COLONOSCOPY;  Surgeon: Leidy Zepeda MD;  Location[facility]; Service: Gastroenterology       Current Outpatient Medications:     atoMOXetine (STRATTERA) 60 mg capsule, Take 1 capsule (60 mg total) by mouth daily, Disp: 90 capsule, Rfl: 1    buPROPion (WELLBUTRIN XL) 150 mg 24 hr tablet, Take 1 tablet (150 mg total) by mouth every morning, Disp: 90 tablet, Rfl: 1    buPROPion (WELLBUTRIN XL) 300 mg 24 hr tablet, Take 1 tablet (300 mg total) by mouth every morning, Disp: 90 tablet, Rfl: 1    topiramate (TOPAMAX) 100 mg tablet, Take 1 pill twice a day, Disp: 180 tablet, Rfl: 1    hydrOXYzine (VISTARIL) 100 MG capsule, Take 1 capsule (100 mg total) by mouth 3 (three) times a day as needed for anxiety, Disp: 30 capsule, Rfl: 0    phentermine 37 5 MG capsule, Take 1 capsule (37 5 mg total) by mouth every morning (Patient not taking: Reported on 3/30/2021), Disp: 30 capsule, Rfl: 1    Allergies   Allergen Reactions    Tylenol Cold Severe Congestion [Pseudoephedrine-[id number]]           Lab Review   No visits with results within 2 Month(s) from this visit  Latest known visit with results is:   Orders Only on 11/06/2020   Component Date Value    SARS-CoV-2  11/06/2020 Not Detected         Imaging: No results found      Objective:     Physical Exam  Constitutional:       Appearance: She is well-developed  Cardiovascular:      Rate and Rhythm: Normal rate and regular rhythm  Heart sounds: Normal heart sounds  No murmur  Pulmonary:      Effort: Pulmonary effort is normal  No respiratory distress  Breath sounds: Normal breath sounds  Skin:     General: Skin is warm and dry  Neurological:      Mental Status: She is alert and oriented to person, place, and time  There are no Patient Instructions on file for this visit  VAMSHI Patel    Portions of the record may have been created with voice recognition software  Occasional wrong word or "sound a like" substitutions may have occurred due to the inherent limitations of voice recognition software  Read the chart carefully and recognize, using context, where substitutions have occurred

## 2021-04-07 ENCOUNTER — TELEPHONE (OUTPATIENT)
Dept: FAMILY MEDICINE CLINIC | Facility: CLINIC | Age: 46
End: 2021-04-07

## 2021-04-07 DIAGNOSIS — R63.5 WEIGHT GAIN: ICD-10-CM

## 2021-04-07 RX ORDER — PHENTERMINE HYDROCHLORIDE 37.5 MG/1
37.5 CAPSULE ORAL EVERY MORNING
Qty: 30 CAPSULE | Refills: 1 | Status: SHIPPED | OUTPATIENT
Start: 2021-04-07 | End: 2021-08-23 | Stop reason: SDUPTHER

## 2021-04-07 NOTE — TELEPHONE ENCOUNTER
patient called   She went to the pharmacy to pick her med's up everything was there except the phentermine

## 2021-05-10 RX ORDER — CYCLOBENZAPRINE HCL 5 MG
5 TABLET ORAL 3 TIMES DAILY PRN
COMMUNITY
Start: 2021-03-15 | End: 2021-08-20 | Stop reason: ALTCHOICE

## 2021-05-10 RX ORDER — NAPROXEN 500 MG/1
TABLET ORAL
COMMUNITY
Start: 2021-03-16 | End: 2021-08-20 | Stop reason: ALTCHOICE

## 2021-08-19 DIAGNOSIS — F32.A DEPRESSION, UNSPECIFIED DEPRESSION TYPE: ICD-10-CM

## 2021-08-20 ENCOUNTER — OFFICE VISIT (OUTPATIENT)
Dept: FAMILY MEDICINE CLINIC | Facility: CLINIC | Age: 46
End: 2021-08-20
Payer: COMMERCIAL

## 2021-08-20 VITALS
BODY MASS INDEX: 30.56 KG/M2 | DIASTOLIC BLOOD PRESSURE: 78 MMHG | SYSTOLIC BLOOD PRESSURE: 120 MMHG | HEIGHT: 64 IN | TEMPERATURE: 98.7 F | OXYGEN SATURATION: 98 % | WEIGHT: 179 LBS | HEART RATE: 88 BPM

## 2021-08-20 DIAGNOSIS — G89.29 CHRONIC PAIN OF BOTH KNEES: ICD-10-CM

## 2021-08-20 DIAGNOSIS — M25.561 CHRONIC PAIN OF BOTH KNEES: ICD-10-CM

## 2021-08-20 DIAGNOSIS — K21.9 GASTROESOPHAGEAL REFLUX DISEASE WITHOUT ESOPHAGITIS: ICD-10-CM

## 2021-08-20 DIAGNOSIS — F32.A DEPRESSION, UNSPECIFIED DEPRESSION TYPE: Primary | ICD-10-CM

## 2021-08-20 DIAGNOSIS — R10.30 LOWER ABDOMINAL PAIN: ICD-10-CM

## 2021-08-20 DIAGNOSIS — G44.219 EPISODIC TENSION-TYPE HEADACHE, NOT INTRACTABLE: ICD-10-CM

## 2021-08-20 DIAGNOSIS — M25.562 CHRONIC PAIN OF BOTH KNEES: ICD-10-CM

## 2021-08-20 PROCEDURE — 99214 OFFICE O/P EST MOD 30 MIN: CPT | Performed by: NURSE PRACTITIONER

## 2021-08-20 RX ORDER — AMITRIPTYLINE HYDROCHLORIDE 25 MG/1
25 TABLET, FILM COATED ORAL
Qty: 30 TABLET | Refills: 0 | Status: SHIPPED | OUTPATIENT
Start: 2021-08-20 | End: 2021-09-20

## 2021-08-20 RX ORDER — FLUOXETINE 10 MG/1
10 CAPSULE ORAL DAILY
Qty: 30 CAPSULE | Refills: 0 | Status: SHIPPED | OUTPATIENT
Start: 2021-08-20 | End: 2021-09-20

## 2021-08-20 RX ORDER — BUPROPION HYDROCHLORIDE 150 MG/1
TABLET ORAL
Qty: 90 TABLET | Refills: 1 | Status: SHIPPED | OUTPATIENT
Start: 2021-08-20 | End: 2021-09-21 | Stop reason: ALTCHOICE

## 2021-08-20 RX ORDER — PANTOPRAZOLE SODIUM 40 MG/1
40 TABLET, DELAYED RELEASE ORAL
Qty: 90 TABLET | Refills: 0 | Status: SHIPPED | OUTPATIENT
Start: 2021-08-20 | End: 2022-02-03 | Stop reason: ALTCHOICE

## 2021-08-23 DIAGNOSIS — R41.840 ATTENTION AND CONCENTRATION DEFICIT: ICD-10-CM

## 2021-08-23 DIAGNOSIS — F41.9 ANXIETY: ICD-10-CM

## 2021-08-23 DIAGNOSIS — R63.5 WEIGHT GAIN: ICD-10-CM

## 2021-08-23 DIAGNOSIS — F32.A DEPRESSION, UNSPECIFIED DEPRESSION TYPE: ICD-10-CM

## 2021-08-23 RX ORDER — PHENTERMINE HYDROCHLORIDE 37.5 MG/1
37.5 CAPSULE ORAL EVERY MORNING
Qty: 30 CAPSULE | Refills: 1 | Status: SHIPPED | OUTPATIENT
Start: 2021-08-23 | End: 2021-09-21 | Stop reason: ALTCHOICE

## 2021-08-23 RX ORDER — ATOMOXETINE 60 MG/1
60 CAPSULE ORAL DAILY
Qty: 90 CAPSULE | Refills: 1 | Status: SHIPPED | OUTPATIENT
Start: 2021-08-23 | End: 2022-02-03 | Stop reason: ALTCHOICE

## 2021-08-23 RX ORDER — HYDROXYZINE PAMOATE 100 MG/1
100 CAPSULE ORAL 3 TIMES DAILY PRN
Qty: 30 CAPSULE | Refills: 0 | Status: SHIPPED | OUTPATIENT
Start: 2021-08-23 | End: 2021-09-21 | Stop reason: SDUPTHER

## 2021-08-23 RX ORDER — BUPROPION HYDROCHLORIDE 300 MG/1
300 TABLET ORAL EVERY MORNING
Qty: 90 TABLET | Refills: 1 | Status: SHIPPED | OUTPATIENT
Start: 2021-08-23 | End: 2022-02-03 | Stop reason: ALTCHOICE

## 2021-08-23 NOTE — TELEPHONE ENCOUNTER
Patient needs her older medications refilled  She is asking for all her medications refilled at the same time

## 2021-08-30 ENCOUNTER — OFFICE VISIT (OUTPATIENT)
Dept: OBGYN CLINIC | Facility: CLINIC | Age: 46
End: 2021-08-30
Payer: COMMERCIAL

## 2021-08-30 ENCOUNTER — APPOINTMENT (OUTPATIENT)
Dept: RADIOLOGY | Facility: CLINIC | Age: 46
End: 2021-08-30
Payer: COMMERCIAL

## 2021-08-30 VITALS
SYSTOLIC BLOOD PRESSURE: 130 MMHG | RESPIRATION RATE: 18 BRPM | BODY MASS INDEX: 29.53 KG/M2 | DIASTOLIC BLOOD PRESSURE: 91 MMHG | WEIGHT: 173 LBS | HEIGHT: 64 IN | HEART RATE: 102 BPM

## 2021-08-30 DIAGNOSIS — M25.562 CHRONIC PAIN OF BOTH KNEES: ICD-10-CM

## 2021-08-30 DIAGNOSIS — M25.561 CHRONIC PAIN OF BOTH KNEES: ICD-10-CM

## 2021-08-30 DIAGNOSIS — G89.29 CHRONIC PAIN OF BOTH KNEES: ICD-10-CM

## 2021-08-30 DIAGNOSIS — M22.42 CHONDROMALACIA, PATELLA, LEFT: ICD-10-CM

## 2021-08-30 DIAGNOSIS — M22.41 CHONDROMALACIA, PATELLA, RIGHT: Primary | ICD-10-CM

## 2021-08-30 PROCEDURE — 73564 X-RAY EXAM KNEE 4 OR MORE: CPT

## 2021-08-30 PROCEDURE — 3008F BODY MASS INDEX DOCD: CPT | Performed by: ORTHOPAEDIC SURGERY

## 2021-08-30 PROCEDURE — 1036F TOBACCO NON-USER: CPT | Performed by: ORTHOPAEDIC SURGERY

## 2021-08-30 PROCEDURE — 20610 DRAIN/INJ JOINT/BURSA W/O US: CPT | Performed by: ORTHOPAEDIC SURGERY

## 2021-08-30 PROCEDURE — 99243 OFF/OP CNSLTJ NEW/EST LOW 30: CPT | Performed by: ORTHOPAEDIC SURGERY

## 2021-08-30 RX ADMIN — LIDOCAINE HYDROCHLORIDE 2 ML: 10 INJECTION, SOLUTION INFILTRATION; PERINEURAL at 19:18

## 2021-08-30 RX ADMIN — METHYLPREDNISOLONE ACETATE 2 ML: 40 INJECTION, SUSPENSION INTRA-ARTICULAR; INTRALESIONAL; INTRAMUSCULAR; SOFT TISSUE at 19:18

## 2021-08-30 RX ADMIN — BUPIVACAINE HYDROCHLORIDE 2 ML: 2.5 INJECTION, SOLUTION INFILTRATION; PERINEURAL at 19:18

## 2021-08-30 NOTE — PROGRESS NOTES
Patient Name:  Monika Grover  MRN:  [id number]    Edgar Zepeda     1  Chondromalacia, patella, right  -     Large joint arthrocentesis: bilateral knee    2  Chronic pain of both knees  Comments: Will obtain xrays and refer to ortho  Orders:  -     Ambulatory referral to Orthopedic Surgery  -     XR knee 4+ vw right injury; Future; Expected date: 08/30/2021  -     XR knee 4+ vw left injury; Future; Expected date: 08/30/2021  -     Ambulatory referral to Physical Therapy; Future    3  Chondromalacia, patella, left  -     Large joint arthrocentesis: bilateral knee        Patient is symptomatic from bilateral patellar chondromalacia  Multiple treatment options were discussed including conservative management with oral analgesics, activity modification, formal physical therapy, and home exercise program   Home exercises were provided to focus on short arc quadriceps strengthening  She was given a prescription for physical therapy to work on hip abductor and quadriceps strengthening  She was also instructed to avoid deep squatting, lunging, or high impact activity  We also discussed bilateral knee corticosteroid injections  Risks and benefits of corticosteroid injection were discussed in detail  Risks including:  Post injection pain, elevation in blood sugar, skin discoloration, fatty atrophy, and infection were discussed in detail  The patient understood and elected to proceed forward  After sterile preparation the bilateral knees were injected with 2 cc of 1% lidocaine, 2 cc of 0 25% bupivacaine, and 2 cc of Depo-Medrol  The patient tolerated the procedure and no immediate complications were noted  The patient was instructed to ice and elevate the injection site, limit strenuous activity for the next 24-48 hours, and contact us if there were any questions or concerns prior to their follow-up appointment  They were also instructed to contact their primary care physician to discuss elevated blood sugar  I will see the patient back  In 6-8 weeks for repeat evaluation  Chief Complaint      bilateral knee pain    History of the Present Illness     April M Sameer Crow is a 42 y o  female with  Bilateral knee pain, right greater than left  Pain has been going on for many years but has recently worsened after performing deep squats at the gym  Pain is located in the front of her knees  Pain is worse with climbing stairs, climbing up into her work truck, and after sitting for long periods of time  She denies any discrete locking or giving way  No obvious swelling present  No numbness or tingling  Review of Systems     Review of Systems   Constitutional: Negative for appetite change and unexpected weight change  HENT: Negative for congestion and trouble swallowing  Eyes: Negative for visual disturbance  Respiratory: Negative for cough and shortness of breath  Cardiovascular: Negative for chest pain and palpitations  Gastrointestinal: Negative for nausea and vomiting  Endocrine: Negative for cold intolerance and heat intolerance  Musculoskeletal: Negative for gait problem and myalgias  Skin: Negative for rash  Neurological: Negative for numbness  Physical Exam     /91   Pulse 102   Resp 18   Ht 5' 4" (1 626 m)   Wt 78 5 kg (173 lb)   BMI 29 70 kg/m²       Right Knee: Range of motion from  0 to  130° with pain at terminal flexion  There is  Mild patellofemoral crepitus with range of motion  There is  trace effusion  There is mild tenderness over the  Medial and lateral joint line  There is  5/5 quadriceps strength and  normal tone  The patient  Can not perform a straight leg raise  positive  patellar grind test   No instability noted with lateral translation, firm endpoint with 2 quadrants of lateral translation  Anterior drawer tests is  negative  negative Lachman Test  Posterior drawer test is  negative    Varus stress testing reveals  No instability or pain at 0 and 30°   Valgus stress testing reveals  No instability or pain at 0 and 30°  Natalie's testing is  negative  The patient is neurovascular intact distally  Left Knee: Range of motion from  0 to  130° with pain at terminal flexion  There is  Mild patellofemoral crepitus with range of motion  There is  no effusion  There is mild tenderness over the  Medial and lateral joint line  There is  5/5 quadriceps strength and  normal tone  The patient  Can not perform a straight leg raise  positive  patellar grind test   No instability noted with lateral translation, firm endpoint with 2 quadrants of lateral translation  Anterior drawer tests is  negative  negative Lachman Test  Posterior drawer test is  negative  Varus stress testing reveals  No instability or pain at 0 and 30°  Valgus stress testing reveals  No instability or pain at 0 and 30°  Natalie's testing is  negative  The patient is neurovascular intact distally  Eyes:  Anicteric sclerae  Neck:  Supple  Lungs:  Normal respiratory effort  Cardiovascular:  Capillary refill is less than 2 seconds  Skin:  Intact without erythema  Neurologic:  Sensation grossly intact to light touch  Psychiatric:  Mood and affect are appropriate  Data Review     I have personally reviewed pertinent films in PACS, and my interpretation follows: No fracture dislocation noted on bilateral knee x-rays  Joint spaces are relatively well maintained with increased lateral patellar tilt on the right knee        Past Medical History:   Diagnosis Date    Anxiety     Chronic pain disorder     lumbar radiculopathy    Depression     Headache        Past Surgical History:   Procedure Laterality Date    CERVICAL BIOPSY  W/ LOOP ELECTRODE EXCISION      HERNIA REPAIR      HERNIA REPAIR Left 9/27/2017    Procedure: LAPAROSCOPIC INGUINAL HERNIA REPAIR WITH MESH;  Surgeon: Ugo Lin MD;  Location: [facility] OR;  Service: General    [place] ESOPHAGOGASTRODUODENOSCOPY TRANSORAL DIAGNOSTIC N/A 2/24/2017    Procedure: EGD AND COLONOSCOPY;  Surgeon: Dominick Mohan MD;  Location: [facility]; Service: Gastroenterology       Allergies   Allergen Reactions    Codeine Other (See Comments)    Tylenol Cold Severe Congestion [Pseudoephedrine-Dm-Gg-Apap]        Current Outpatient Medications on File Prior to Visit   Medication Sig Dispense Refill    amitriptyline (ELAVIL) 25 mg tablet Take 1 tablet (25 mg total) by mouth daily at bedtime 30 tablet 0    atoMOXetine (STRATTERA) 60 mg capsule Take 1 capsule (60 mg total) by mouth daily 90 capsule 1    buPROPion (WELLBUTRIN XL) 150 mg 24 hr tablet TAKE 1 TABLET BY MOUTH EVERY DAY IN THE MORNING 90 tablet 1    buPROPion (WELLBUTRIN XL) 300 mg 24 hr tablet Take 1 tablet (300 mg total) by mouth every morning 90 tablet 1    FLUoxetine (PROzac) 10 mg capsule Take 1 capsule (10 mg total) by mouth daily 30 capsule 0    hydrOXYzine (VISTARIL) 100 MG capsule Take 1 capsule (100 mg total) by mouth 3 (three) times a day as needed for anxiety 30 capsule 0    pantoprazole (PROTONIX) 40 mg tablet Take 1 tablet (40 mg total) by mouth daily before breakfast 90 tablet 0    phentermine 37 5 MG capsule Take 1 capsule (37 5 mg total) by mouth every morning 30 capsule 1     No current facility-administered medications on file prior to visit  Social History     Tobacco Use    Smoking status: Never Smoker    Smokeless tobacco: Never Used   Vaping Use    Vaping Use: Never used   Substance Use Topics    Alcohol use: No    Drug use: No       Family History   Problem Relation Age of Onset    Depression Mother     Breast cancer Maternal Grandmother     Cancer Family              Procedures Performed     Large joint arthrocentesis: bilateral knee  Universal Protocol:  Consent: Verbal consent obtained    Risks and benefits: risks, benefits and alternatives were discussed  Consent given by: patient  Time out: Immediately prior to procedure a "time out" was called to verify the correct patient, procedure, equipment, support staff and site/side marked as required  Patient understanding: patient states understanding of the procedure being performed  Patient consent: the patient's understanding of the procedure matches consent given  Radiology Images displayed and confirmed   If images not available, report reviewed: imaging studies available    Supporting Documentation  Indications: pain   Procedure Details  Location: knee - bilateral knee  Needle size: 22 G  Ultrasound guidance: no  Approach: lateral    Medications (Right): 2 mL bupivacaine 0 25 %; 2 mL lidocaine 1 %; 2 mL methylPREDNISolone acetate 40 mg/mLMedications (Left): 2 mL bupivacaine 0 25 %; 2 mL lidocaine 1 %; 2 mL methylPREDNISolone acetate 40 mg/mL   Patient tolerance: patient tolerated the procedure well with no immediate complications  Dressing:  Sterile dressing applied              Homero Camargo DO

## 2021-08-30 NOTE — LETTER
September 1, 2021     Tammy Sargent, [address] [facility]    Patient: April Miriam Standard   YOB: 1975   Date of Visit: 8/30/2021       Dear Dr Yeager Slider:    Thank you for referring Chandlerallyssa Hawthorne to me for evaluation  Below are my notes for this consultation  If you have questions, please do not hesitate to call me  I look forward to following your patient along with you  Sincerely,        Evelyn Cortez DO        CC: No Recipients  Evelyn Cortez DO  8/31/2021  8:04 PM  Signed  Patient Name:  Mechelle Carson  MRN:  [id number]    Assessment & Plan     1  Chondromalacia, patella, right  -     Large joint arthrocentesis: bilateral knee    2  Chronic pain of both knees  Comments: Will obtain xrays and refer to ortho  Orders:  -     Ambulatory referral to Orthopedic Surgery  -     XR knee 4+ vw right injury; Future; Expected date: 08/30/2021  -     XR knee 4+ vw left injury; Future; Expected date: 08/30/2021  -     Ambulatory referral to Physical Therapy; Future    3  Chondromalacia, patella, left  -     Large joint arthrocentesis: bilateral knee        Patient is symptomatic from bilateral patellar chondromalacia  Multiple treatment options were discussed including conservative management with oral analgesics, activity modification, formal physical therapy, and home exercise program   Home exercises were provided to focus on short arc quadriceps strengthening  She was given a prescription for physical therapy to work on hip abductor and quadriceps strengthening  She was also instructed to avoid deep squatting, lunging, or high impact activity  We also discussed bilateral knee corticosteroid injections  Risks and benefits of corticosteroid injection were discussed in detail  Risks including:  Post injection pain, elevation in blood sugar, skin discoloration, fatty atrophy, and infection were discussed in detail  The patient understood and elected to proceed forward  After sterile preparation the bilateral knees were injected with 2 cc of 1% lidocaine, 2 cc of 0 25% bupivacaine, and 2 cc of Depo-Medrol  The patient tolerated the procedure and no immediate complications were noted  The patient was instructed to ice and elevate the injection site, limit strenuous activity for the next 24-48 hours, and contact us if there were any questions or concerns prior to their follow-up appointment  They were also instructed to contact their primary care physician to discuss elevated blood sugar  I will see the patient back  In 6-8 weeks for repeat evaluation  Chief Complaint      bilateral knee pain    History of the Present Illness     April M Myrtle Magallanes is a 42 y o  female with  Bilateral knee pain, right greater than left  Pain has been going on for many years but has recently worsened after performing deep squats at the gym  Pain is located in the front of her knees  Pain is worse with climbing stairs, climbing up into her work truck, and after sitting for long periods of time  She denies any discrete locking or giving way  No obvious swelling present  No numbness or tingling  Review of Systems     Review of Systems   Constitutional: Negative for appetite change and unexpected weight change  HENT: Negative for congestion and trouble swallowing  Eyes: Negative for visual disturbance  Respiratory: Negative for cough and shortness of breath  Cardiovascular: Negative for chest pain and palpitations  Gastrointestinal: Negative for nausea and vomiting  Endocrine: Negative for cold intolerance and heat intolerance  Musculoskeletal: Negative for gait problem and myalgias  Skin: Negative for rash  Neurological: Negative for numbness  Physical Exam     /91   Pulse 102   Resp 18   Ht 5' 4" (1 626 m)   Wt 78 5 kg (173 lb)   BMI 29 70 kg/m²       Right Knee: Range of motion from  0 to  130° with pain at terminal flexion    There is  Mild patellofemoral crepitus with range of motion  There is  trace effusion  There is mild tenderness over the  Medial and lateral joint line  There is  5/5 quadriceps strength and  normal tone  The patient  Can not perform a straight leg raise  positive  patellar grind test   No instability noted with lateral translation, firm endpoint with 2 quadrants of lateral translation  Anterior drawer tests is  negative  negative Lachman Test  Posterior drawer test is  negative  Varus stress testing reveals  No instability or pain at 0 and 30°  Valgus stress testing reveals  No instability or pain at 0 and 30°  Natalie's testing is  negative  The patient is neurovascular intact distally  Left Knee: Range of motion from  0 to  130° with pain at terminal flexion  There is  Mild patellofemoral crepitus with range of motion  There is  no effusion  There is mild tenderness over the  Medial and lateral joint line  There is  5/5 quadriceps strength and  normal tone  The patient  Can not perform a straight leg raise  positive  patellar grind test   No instability noted with lateral translation, firm endpoint with 2 quadrants of lateral translation  Anterior drawer tests is  negative  negative Lachman Test  Posterior drawer test is  negative  Varus stress testing reveals  No instability or pain at 0 and 30°  Valgus stress testing reveals  No instability or pain at 0 and 30°  Natalie's testing is  negative  The patient is neurovascular intact distally  Eyes:  Anicteric sclerae  Neck:  Supple  Lungs:  Normal respiratory effort  Cardiovascular:  Capillary refill is less than 2 seconds  Skin:  Intact without erythema  Neurologic:  Sensation grossly intact to light touch  Psychiatric:  Mood and affect are appropriate  Data Review     I have personally reviewed pertinent films in PACS, and my interpretation follows: No fracture dislocation noted on bilateral knee x-rays    Joint spaces are relatively well maintained with increased lateral patellar tilt on the right knee  Past Medical History:   Diagnosis Date    Anxiety     Chronic pain disorder     lumbar radiculopathy    Depression     Headache        Past Surgical History:   Procedure Laterality Date    CERVICAL BIOPSY  W/ LOOP ELECTRODE EXCISION      HERNIA REPAIR      HERNIA REPAIR Left 9/27/2017    Procedure: LAPAROSCOPIC INGUINAL HERNIA REPAIR WITH MESH;  Surgeon: Myrna Pace MD;  Location: [facility];  Service: General    [place] ESOPHAGOGASTRODUODENOSCOPY TRANSORAL DIAGNOSTIC N/A 2/24/2017    Procedure: EGD AND COLONOSCOPY;  Surgeon: Екатерина Potts MD;  Location: [facility]; Service: Gastroenterology       Allergies   Allergen Reactions    Codeine Other (See Comments)    Tylenol Cold Severe Congestion [Pseudoephedrine-Dm-Gg-Apap]        Current Outpatient Medications on File Prior to Visit   Medication Sig Dispense Refill    amitriptyline (ELAVIL) 25 mg tablet Take 1 tablet (25 mg total) by mouth daily at bedtime 30 tablet 0    atoMOXetine (STRATTERA) 60 mg capsule Take 1 capsule (60 mg total) by mouth daily 90 capsule 1    buPROPion (WELLBUTRIN XL) 150 mg 24 hr tablet TAKE 1 TABLET BY MOUTH EVERY DAY IN THE MORNING 90 tablet 1    buPROPion (WELLBUTRIN XL) 300 mg 24 hr tablet Take 1 tablet (300 mg total) by mouth every morning 90 tablet 1    FLUoxetine (PROzac) 10 mg capsule Take 1 capsule (10 mg total) by mouth daily 30 capsule 0    hydrOXYzine (VISTARIL) 100 MG capsule Take 1 capsule (100 mg total) by mouth 3 (three) times a day as needed for anxiety 30 capsule 0    pantoprazole (PROTONIX) 40 mg tablet Take 1 tablet (40 mg total) by mouth daily before breakfast 90 tablet 0    phentermine 37 5 MG capsule Take 1 capsule (37 5 mg total) by mouth every morning 30 capsule 1     No current facility-administered medications on file prior to visit         Social History     Tobacco Use    Smoking status: Never Smoker  Smokeless tobacco: Never Used   Vaping Use    Vaping Use: Never used   Substance Use Topics    Alcohol use: No    Drug use: No       Family History   Problem Relation Age of Onset    Depression Mother     Breast cancer Maternal Grandmother     Cancer Family              Procedures Performed     Large joint arthrocentesis: bilateral knee  Universal Protocol:  Consent: Verbal consent obtained  Risks and benefits: risks, benefits and alternatives were discussed  Consent given by: patient  Time out: Immediately prior to procedure a "time out" was called to verify the correct patient, procedure, equipment, support staff and site/side marked as required  Patient understanding: patient states understanding of the procedure being performed  Patient consent: the patient's understanding of the procedure matches consent given  Radiology Images displayed and confirmed   If images not available, report reviewed: imaging studies available    Supporting Documentation  Indications: pain   Procedure Details  Location: knee - bilateral knee  Needle size: 22 G  Ultrasound guidance: no  Approach: lateral    Medications (Right): 2 mL bupivacaine 0 25 %; 2 mL lidocaine 1 %; 2 mL methylPREDNISolone acetate 40 mg/mLMedications (Left): 2 mL bupivacaine 0 25 %; 2 mL lidocaine 1 %; 2 mL methylPREDNISolone acetate 40 mg/mL   Patient tolerance: patient tolerated the procedure well with no immediate complications  Dressing:  Sterile dressing applied              Mary Stalls, DO

## 2021-08-31 ENCOUNTER — TELEPHONE (OUTPATIENT)
Dept: OBGYN CLINIC | Facility: HOSPITAL | Age: 46
End: 2021-08-31

## 2021-08-31 RX ORDER — LIDOCAINE HYDROCHLORIDE 10 MG/ML
2 INJECTION, SOLUTION INFILTRATION; PERINEURAL
Status: COMPLETED | OUTPATIENT
Start: 2021-08-30 | End: 2021-08-30

## 2021-08-31 RX ORDER — METHYLPREDNISOLONE ACETATE 40 MG/ML
2 INJECTION, SUSPENSION INTRA-ARTICULAR; INTRALESIONAL; INTRAMUSCULAR; SOFT TISSUE
Status: COMPLETED | OUTPATIENT
Start: 2021-08-30 | End: 2021-08-30

## 2021-08-31 RX ORDER — BUPIVACAINE HYDROCHLORIDE 2.5 MG/ML
2 INJECTION, SOLUTION INFILTRATION; PERINEURAL
Status: COMPLETED | OUTPATIENT
Start: 2021-08-30 | End: 2021-08-30

## 2021-08-31 NOTE — TELEPHONE ENCOUNTER
Spoke to patient  She stated she is having so much pain in her knees since yesterday she can barely walk  Stated she has tried ice but it is not helping  Denies redness heat to touch or increased swelling  She took off work today due to the pain  Advised her to try OTC medications  Tylenol and advil as recommended on the bottle, alternating and ice 20 mins on 20 mins off and rest when needed  Patient will try the above and call back if it does not help  Advised this is normal for a couple of days following injection  Should subside over the next 48 hrs      Understanding verbalized

## 2021-08-31 NOTE — TELEPHONE ENCOUNTER
Patient sees Dr Mary Anne Daniels  She is calling because she had cortisone injections in both knees yesterday  She is stating that she can barely walk now  She is asking for a call back

## 2021-09-17 DIAGNOSIS — G44.219 EPISODIC TENSION-TYPE HEADACHE, NOT INTRACTABLE: ICD-10-CM

## 2021-09-17 DIAGNOSIS — F32.A DEPRESSION, UNSPECIFIED DEPRESSION TYPE: ICD-10-CM

## 2021-09-20 ENCOUNTER — TELEPHONE (OUTPATIENT)
Dept: OBGYN CLINIC | Facility: CLINIC | Age: 46
End: 2021-09-20

## 2021-09-20 RX ORDER — AMITRIPTYLINE HYDROCHLORIDE 25 MG/1
TABLET, FILM COATED ORAL
Qty: 30 TABLET | Refills: 0 | Status: SHIPPED | OUTPATIENT
Start: 2021-09-20 | End: 2021-09-21 | Stop reason: SDUPTHER

## 2021-09-20 RX ORDER — FLUOXETINE 10 MG/1
CAPSULE ORAL
Qty: 30 CAPSULE | Refills: 0 | Status: SHIPPED | OUTPATIENT
Start: 2021-09-20 | End: 2021-09-21 | Stop reason: ALTCHOICE

## 2021-09-21 ENCOUNTER — OFFICE VISIT (OUTPATIENT)
Dept: FAMILY MEDICINE CLINIC | Facility: CLINIC | Age: 46
End: 2021-09-21
Payer: COMMERCIAL

## 2021-09-21 VITALS
TEMPERATURE: 98.2 F | HEART RATE: 104 BPM | WEIGHT: 176 LBS | DIASTOLIC BLOOD PRESSURE: 90 MMHG | SYSTOLIC BLOOD PRESSURE: 120 MMHG | OXYGEN SATURATION: 98 % | BODY MASS INDEX: 30.05 KG/M2 | RESPIRATION RATE: 14 BRPM | HEIGHT: 64 IN

## 2021-09-21 DIAGNOSIS — G44.219 EPISODIC TENSION-TYPE HEADACHE, NOT INTRACTABLE: ICD-10-CM

## 2021-09-21 DIAGNOSIS — F32.A DEPRESSION, UNSPECIFIED DEPRESSION TYPE: ICD-10-CM

## 2021-09-21 DIAGNOSIS — R63.5 WEIGHT GAIN: ICD-10-CM

## 2021-09-21 DIAGNOSIS — F41.9 ANXIETY: ICD-10-CM

## 2021-09-21 DIAGNOSIS — F34.1 DYSTHYMIA: Primary | ICD-10-CM

## 2021-09-21 PROCEDURE — 3725F SCREEN DEPRESSION PERFORMED: CPT | Performed by: NURSE PRACTITIONER

## 2021-09-21 PROCEDURE — 1036F TOBACCO NON-USER: CPT | Performed by: NURSE PRACTITIONER

## 2021-09-21 PROCEDURE — 99214 OFFICE O/P EST MOD 30 MIN: CPT | Performed by: NURSE PRACTITIONER

## 2021-09-21 PROCEDURE — 3008F BODY MASS INDEX DOCD: CPT | Performed by: NURSE PRACTITIONER

## 2021-09-21 RX ORDER — AMITRIPTYLINE HYDROCHLORIDE 50 MG/1
50 TABLET, FILM COATED ORAL
Qty: 30 TABLET | Refills: 0 | Status: SHIPPED | OUTPATIENT
Start: 2021-09-21 | End: 2021-10-14

## 2021-09-21 RX ORDER — HYDROXYZINE PAMOATE 100 MG/1
100 CAPSULE ORAL 3 TIMES DAILY PRN
Qty: 30 CAPSULE | Refills: 0 | Status: SHIPPED | OUTPATIENT
Start: 2021-09-21 | End: 2022-02-03 | Stop reason: SDUPTHER

## 2021-09-21 RX ORDER — BUPROPION HYDROCHLORIDE 150 MG/1
150 TABLET ORAL EVERY MORNING
Qty: 90 TABLET | Refills: 1
Start: 2021-09-21 | End: 2022-02-03 | Stop reason: ALTCHOICE

## 2021-09-21 NOTE — PROGRESS NOTES
Assessment/Plan:     Chronic Problems:  Dysthymia  Advised to go to psych as medications do not seem to help  Anxiety  Continue vistaril as needed  Refer to psych  Weight gain  Encouraged healthy diet and exercises  Patient has tried phentermine and topamax with no weight loss  Discussed healthy diet at length  Visit Diagnosis:  Diagnoses and all orders for this visit:    Dysthymia    Episodic tension-type headache, not intractable  -     amitriptyline (ELAVIL) 50 mg tablet; Take 1 tablet (50 mg total) by mouth daily at bedtime    Depression, unspecified depression type  -     Ambulatory referral to Psychiatry; Future  -     buPROPion (WELLBUTRIN XL) 150 mg 24 hr tablet; Take 1 tablet (150 mg total) by mouth every morning    Anxiety  -     hydrOXYzine (VISTARIL) 100 MG capsule; Take 1 capsule (100 mg total) by mouth 3 (three) times a day as needed for anxiety    Weight gain          Subjective:    Patient ID: April M Geoffrey Rico is a 42 y o  female  Patient presents for follow up  Phentermine is not working  Concerned with weight  States she is eating cereal in the morning, salads with chicken at night, she feels she does not eat much  She works a very active job, but does not exercise regularly  Amitriptyline is helping, not as severe, but Still having migraines  prozac decreased her sex drive and does not like side effects  Patient is having panic attacks  Vistaril is helping to sleep  The following portions of the patient's history were reviewed and updated as appropriate: allergies, current medications, past family history, past medical history, past social history, past surgical history and problem list     Review of Systems   Constitutional: Negative for chills and fever  HENT: Negative for ear pain and sore throat  Eyes: Negative for pain and visual disturbance  Respiratory: Negative for cough and shortness of breath  Cardiovascular: Negative for chest pain and palpitations  Gastrointestinal: Negative for abdominal pain and vomiting  Genitourinary: Negative for dysuria and hematuria  Musculoskeletal: Positive for arthralgias  Negative for back pain  Skin: Negative for color change and rash  Neurological: Positive for headaches  Negative for seizures and syncope  Psychiatric/Behavioral: Positive for dysphoric mood and sleep disturbance  The patient is nervous/anxious  All other systems reviewed and are negative  /90   Pulse 104   Temp 98 2 °F (36 8 °C)   Resp 14   Ht 5' 4" (1 626 m)   Wt 79 8 kg (176 lb)   SpO2 98%   BMI 30 21 kg/m²   Social History     Socioeconomic History    Marital status: Single     Spouse name: Not on file    Number of children: 2    Years of education: Not on file    Highest education level: Not on file   Occupational History    Occupation: [employer]   Tobacco Use    Smoking status: Never Smoker    Smokeless tobacco: Never Used   Vaping Use    Vaping Use: Never used   Substance and Sexual Activity    Alcohol use: No    Drug use: No    Sexual activity: Not on file   Other Topics Concern    Not on file   Social History Narrative    Daily coffee consumption     Social Determinants of Health     Financial Resource Strain:     Difficulty of Paying Living Expenses:    Food Insecurity:     Worried About Running Out of Food in the Last Year:     Ran Out of Food in the Last Year:    Transportation Needs:     Lack of Transportation (Medical):      Lack of Transportation (Non-Medical):    Physical Activity:     Days of Exercise per Week:     Minutes of Exercise per Session:    Stress:     Feeling of Stress :    Social Connections:     Frequency of Communication with Friends and Family:     Frequency of Social Gatherings with Friends and Family:     Attends Scientologist Services:     Active Member of Clubs or Organizations:     Attends Club or Organization Meetings:     Marital Status:    Intimate Partner Violence:     Fear of Current or Ex-Partner:     Emotionally Abused:     Physically Abused:     Sexually Abused:      Past Medical History:   Diagnosis Date    Anxiety     Chronic pain disorder     lumbar radiculopathy    Depression     Headache      Family History   Problem Relation Age of Onset    Depression Mother     Breast cancer Maternal Grandmother     Cancer Family      Past Surgical History:   Procedure Laterality Date    CERVICAL BIOPSY  W/ LOOP ELECTRODE EXCISION      HERNIA REPAIR      HERNIA REPAIR Left 9/27/2017    Procedure: LAPAROSCOPIC INGUINAL HERNIA REPAIR WITH MESH;  Surgeon: Leo Amaya MD;  Location: [facility];  Service: General    [place] ESOPHAGOGASTRODUODENOSCOPY TRANSORAL DIAGNOSTIC N/A 2/24/2017    Procedure: EGD AND COLONOSCOPY;  Surgeon: Polina Caballero MD;  Location: [facility]; Service: Gastroenterology       Current Outpatient Medications:     amitriptyline (ELAVIL) 50 mg tablet, Take 1 tablet (50 mg total) by mouth daily at bedtime, Disp: 30 tablet, Rfl: 0    atoMOXetine (STRATTERA) 60 mg capsule, Take 1 capsule (60 mg total) by mouth daily, Disp: 90 capsule, Rfl: 1    buPROPion (WELLBUTRIN XL) 150 mg 24 hr tablet, Take 1 tablet (150 mg total) by mouth every morning, Disp: 90 tablet, Rfl: 1    buPROPion (WELLBUTRIN XL) 300 mg 24 hr tablet, Take 1 tablet (300 mg total) by mouth every morning, Disp: 90 tablet, Rfl: 1    hydrOXYzine (VISTARIL) 100 MG capsule, Take 1 capsule (100 mg total) by mouth 3 (three) times a day as needed for anxiety, Disp: 30 capsule, Rfl: 0    pantoprazole (PROTONIX) 40 mg tablet, Take 1 tablet (40 mg total) by mouth daily before breakfast, Disp: 90 tablet, Rfl: 0    Allergies   Allergen Reactions    Codeine Other (See Comments)    Tylenol Cold Severe Congestion [Pseudoephedrine-Dm-Gg-Apap]           Lab Review   No visits with results within 2 Month(s) from this visit     Latest known visit with results is:   Orders Only on 11/06/2020   Component Date Value    SARS-CoV-2  11/06/2020 Not Detected         Imaging: XR knee 4+ vw left injury    Result Date: 9/5/2021  Narrative: LEFT KNEE INDICATION:   M25 561: Pain in right knee M25 562: Pain in left knee G89 29: Other chronic pain  COMPARISON:  None VIEWS:  XR KNEE 4+ VW LEFT INJURY FINDINGS: There is no acute fracture or dislocation  There is no joint effusion  Mild patellofemoral compartment osteoarthritis  This is evidenced by marginal osteophyte formations  No lytic or blastic osseous lesion  Soft tissues are unremarkable  Impression: No acute osseous abnormality  Mild patellofemoral compartment osteoarthritis  Workstation performed: [id number]     XR knee 4+ vw right injury    Result Date: 9/9/2021  Narrative: RIGHT KNEE INDICATION:   M25 561: Pain in right knee M25 562: Pain in left knee G89 29: Other chronic pain  COMPARISON:  None VIEWS:  XR KNEE 4+ VW RIGHT INJURY FINDINGS: There is no acute fracture or dislocation  Joint effusion cannot be reliably evaluated without lateral view  No significant degenerative changes  No lytic or blastic osseous lesion  Soft tissues are unremarkable  Impression: No acute osseous abnormality  Workstation performed: [id number]       Objective:     Physical Exam  Constitutional:       Appearance: She is well-developed  Cardiovascular:      Rate and Rhythm: Normal rate and regular rhythm  Heart sounds: Normal heart sounds  No murmur heard  Pulmonary:      Effort: Pulmonary effort is normal  No respiratory distress  Breath sounds: Normal breath sounds  Skin:     General: Skin is warm and dry  Neurological:      Mental Status: She is alert and oriented to person, place, and time  There are no Patient Instructions on file for this visit  VAMSHI Patel    Portions of the record may have been created with voice recognition software    Occasional wrong word or "sound a like" substitutions may have occurred due to the inherent limitations of voice recognition software  Read the chart carefully and recognize, using context, where substitutions have occurred

## 2021-09-21 NOTE — ASSESSMENT & PLAN NOTE
Encouraged healthy diet and exercises  Patient has tried phentermine and topamax with no weight loss  Discussed healthy diet at length

## 2021-10-11 ENCOUNTER — OFFICE VISIT (OUTPATIENT)
Dept: FAMILY MEDICINE CLINIC | Facility: CLINIC | Age: 46
End: 2021-10-11
Payer: COMMERCIAL

## 2021-10-11 DIAGNOSIS — R05.9 COUGH: Primary | ICD-10-CM

## 2021-10-11 DIAGNOSIS — J02.9 SORE THROAT: ICD-10-CM

## 2021-10-11 DIAGNOSIS — R53.83 OTHER FATIGUE: ICD-10-CM

## 2021-10-11 PROCEDURE — U0005 INFEC AGEN DETEC AMPLI PROBE: HCPCS | Performed by: FAMILY MEDICINE

## 2021-10-11 PROCEDURE — U0003 INFECTIOUS AGENT DETECTION BY NUCLEIC ACID (DNA OR RNA); SEVERE ACUTE RESPIRATORY SYNDROME CORONAVIRUS 2 (SARS-COV-2) (CORONAVIRUS DISEASE [COVID-19]), AMPLIFIED PROBE TECHNIQUE, MAKING USE OF HIGH THROUGHPUT TECHNOLOGIES AS DESCRIBED BY CMS-2020-01-R: HCPCS | Performed by: FAMILY MEDICINE

## 2021-10-11 PROCEDURE — 99214 OFFICE O/P EST MOD 30 MIN: CPT | Performed by: FAMILY MEDICINE

## 2021-10-11 PROCEDURE — 1036F TOBACCO NON-USER: CPT | Performed by: FAMILY MEDICINE

## 2021-10-12 LAB — SARS-COV-2 RNA RESP QL NAA+PROBE: POSITIVE

## 2021-10-13 ENCOUNTER — TELEPHONE (OUTPATIENT)
Dept: FAMILY MEDICINE CLINIC | Facility: CLINIC | Age: 46
End: 2021-10-13

## 2021-10-14 DIAGNOSIS — G44.219 EPISODIC TENSION-TYPE HEADACHE, NOT INTRACTABLE: ICD-10-CM

## 2021-10-14 RX ORDER — AMITRIPTYLINE HYDROCHLORIDE 50 MG/1
TABLET, FILM COATED ORAL
Qty: 30 TABLET | Refills: 0 | Status: SHIPPED | OUTPATIENT
Start: 2021-10-14 | End: 2021-11-10

## 2021-11-10 DIAGNOSIS — G44.219 EPISODIC TENSION-TYPE HEADACHE, NOT INTRACTABLE: ICD-10-CM

## 2021-11-10 RX ORDER — AMITRIPTYLINE HYDROCHLORIDE 50 MG/1
TABLET, FILM COATED ORAL
Qty: 30 TABLET | Refills: 0 | Status: SHIPPED | OUTPATIENT
Start: 2021-11-10 | End: 2021-11-24

## 2021-11-24 DIAGNOSIS — G44.219 EPISODIC TENSION-TYPE HEADACHE, NOT INTRACTABLE: ICD-10-CM

## 2021-11-24 RX ORDER — AMITRIPTYLINE HYDROCHLORIDE 50 MG/1
TABLET, FILM COATED ORAL
Qty: 90 TABLET | Refills: 1 | Status: SHIPPED | OUTPATIENT
Start: 2021-11-24 | End: 2022-02-03 | Stop reason: SDUPTHER

## 2022-02-03 ENCOUNTER — OFFICE VISIT (OUTPATIENT)
Dept: FAMILY MEDICINE CLINIC | Facility: CLINIC | Age: 47
End: 2022-02-03
Payer: COMMERCIAL

## 2022-02-03 VITALS
TEMPERATURE: 97.5 F | WEIGHT: 189 LBS | HEART RATE: 102 BPM | BODY MASS INDEX: 32.27 KG/M2 | SYSTOLIC BLOOD PRESSURE: 122 MMHG | OXYGEN SATURATION: 98 % | DIASTOLIC BLOOD PRESSURE: 84 MMHG | HEIGHT: 64 IN

## 2022-02-03 DIAGNOSIS — G44.219 EPISODIC TENSION-TYPE HEADACHE, NOT INTRACTABLE: ICD-10-CM

## 2022-02-03 DIAGNOSIS — F33.9 DEPRESSION, RECURRENT (HCC): ICD-10-CM

## 2022-02-03 DIAGNOSIS — Z00.00 HEALTHCARE MAINTENANCE: ICD-10-CM

## 2022-02-03 DIAGNOSIS — F32.A DEPRESSION, UNSPECIFIED DEPRESSION TYPE: Primary | ICD-10-CM

## 2022-02-03 DIAGNOSIS — R41.840 ATTENTION AND CONCENTRATION DEFICIT: ICD-10-CM

## 2022-02-03 DIAGNOSIS — F41.9 ANXIETY: ICD-10-CM

## 2022-02-03 PROCEDURE — 99214 OFFICE O/P EST MOD 30 MIN: CPT | Performed by: NURSE PRACTITIONER

## 2022-02-03 PROCEDURE — 1036F TOBACCO NON-USER: CPT | Performed by: NURSE PRACTITIONER

## 2022-02-03 PROCEDURE — 3008F BODY MASS INDEX DOCD: CPT | Performed by: NURSE PRACTITIONER

## 2022-02-03 RX ORDER — SERTRALINE HYDROCHLORIDE 25 MG/1
25 TABLET, FILM COATED ORAL DAILY
Qty: 30 TABLET | Refills: 0 | Status: SHIPPED | OUTPATIENT
Start: 2022-02-03 | End: 2022-02-25

## 2022-02-03 RX ORDER — AMITRIPTYLINE HYDROCHLORIDE 75 MG/1
75 TABLET, FILM COATED ORAL
Qty: 90 TABLET | Refills: 1 | Status: SHIPPED | OUTPATIENT
Start: 2022-02-03 | End: 2022-07-21

## 2022-02-03 RX ORDER — DEXTROAMPHETAMINE SACCHARATE, AMPHETAMINE ASPARTATE MONOHYDRATE, DEXTROAMPHETAMINE SULFATE AND AMPHETAMINE SULFATE 2.5; 2.5; 2.5; 2.5 MG/1; MG/1; MG/1; MG/1
10 CAPSULE, EXTENDED RELEASE ORAL EVERY MORNING
Qty: 30 CAPSULE | Refills: 0 | Status: SHIPPED | OUTPATIENT
Start: 2022-02-03 | End: 2022-02-28 | Stop reason: SDUPTHER

## 2022-02-03 RX ORDER — HYDROXYZINE PAMOATE 100 MG/1
100 CAPSULE ORAL 3 TIMES DAILY PRN
Qty: 30 CAPSULE | Refills: 0 | Status: SHIPPED | OUTPATIENT
Start: 2022-02-03 | End: 2022-07-21 | Stop reason: SDUPTHER

## 2022-02-25 DIAGNOSIS — F32.A DEPRESSION, UNSPECIFIED DEPRESSION TYPE: ICD-10-CM

## 2022-02-25 RX ORDER — SERTRALINE HYDROCHLORIDE 25 MG/1
25 TABLET, FILM COATED ORAL DAILY
Qty: 30 TABLET | Refills: 0 | Status: SHIPPED | OUTPATIENT
Start: 2022-02-25 | End: 2022-03-23

## 2022-02-28 DIAGNOSIS — R41.840 ATTENTION AND CONCENTRATION DEFICIT: ICD-10-CM

## 2022-02-28 RX ORDER — DEXTROAMPHETAMINE SACCHARATE, AMPHETAMINE ASPARTATE MONOHYDRATE, DEXTROAMPHETAMINE SULFATE AND AMPHETAMINE SULFATE 2.5; 2.5; 2.5; 2.5 MG/1; MG/1; MG/1; MG/1
20 CAPSULE, EXTENDED RELEASE ORAL EVERY MORNING
Qty: 60 CAPSULE | Refills: 0 | Status: SHIPPED | OUTPATIENT
Start: 2022-02-28 | End: 2022-03-15 | Stop reason: ALTCHOICE

## 2022-02-28 NOTE — TELEPHONE ENCOUNTER
Patient called in refill on Adderall  She is asking for dosage to be up'd as per your last visit conversation   Its not working for her Please advise, Thank you

## 2022-03-15 ENCOUNTER — OFFICE VISIT (OUTPATIENT)
Dept: FAMILY MEDICINE CLINIC | Facility: CLINIC | Age: 47
End: 2022-03-15
Payer: COMMERCIAL

## 2022-03-15 VITALS
HEART RATE: 102 BPM | SYSTOLIC BLOOD PRESSURE: 122 MMHG | OXYGEN SATURATION: 98 % | TEMPERATURE: 98 F | DIASTOLIC BLOOD PRESSURE: 80 MMHG | HEIGHT: 64 IN | BODY MASS INDEX: 32.44 KG/M2 | WEIGHT: 190 LBS

## 2022-03-15 DIAGNOSIS — E66.09 CLASS 1 OBESITY DUE TO EXCESS CALORIES WITHOUT SERIOUS COMORBIDITY WITH BODY MASS INDEX (BMI) OF 32.0 TO 32.9 IN ADULT: ICD-10-CM

## 2022-03-15 DIAGNOSIS — F33.9 DEPRESSION, RECURRENT (HCC): ICD-10-CM

## 2022-03-15 DIAGNOSIS — G56.03 BILATERAL CARPAL TUNNEL SYNDROME: ICD-10-CM

## 2022-03-15 DIAGNOSIS — F32.A DEPRESSION, UNSPECIFIED DEPRESSION TYPE: ICD-10-CM

## 2022-03-15 DIAGNOSIS — Z12.31 ENCOUNTER FOR SCREENING MAMMOGRAM FOR MALIGNANT NEOPLASM OF BREAST: Primary | ICD-10-CM

## 2022-03-15 DIAGNOSIS — R41.840 ATTENTION AND CONCENTRATION DEFICIT: ICD-10-CM

## 2022-03-15 PROBLEM — E66.811 CLASS 1 OBESITY DUE TO EXCESS CALORIES WITHOUT SERIOUS COMORBIDITY WITH BODY MASS INDEX (BMI) OF 32.0 TO 32.9 IN ADULT: Status: ACTIVE | Noted: 2022-03-15

## 2022-03-15 PROCEDURE — 3008F BODY MASS INDEX DOCD: CPT | Performed by: NURSE PRACTITIONER

## 2022-03-15 PROCEDURE — 99214 OFFICE O/P EST MOD 30 MIN: CPT | Performed by: NURSE PRACTITIONER

## 2022-03-15 PROCEDURE — 1036F TOBACCO NON-USER: CPT | Performed by: NURSE PRACTITIONER

## 2022-03-15 RX ORDER — DEXTROAMPHETAMINE SACCHARATE, AMPHETAMINE ASPARTATE MONOHYDRATE, DEXTROAMPHETAMINE SULFATE AND AMPHETAMINE SULFATE 7.5; 7.5; 7.5; 7.5 MG/1; MG/1; MG/1; MG/1
30 CAPSULE, EXTENDED RELEASE ORAL EVERY MORNING
Qty: 30 CAPSULE | Refills: 0 | Status: SHIPPED | OUTPATIENT
Start: 2022-03-15 | End: 2022-05-09 | Stop reason: SDUPTHER

## 2022-03-15 NOTE — PROGRESS NOTES
Assessment/Plan:     Chronic Problems:  Attention and concentration deficit  Will increase adderall to 30mg daily  Advised to make appointment with psychiatry as she feels nothing has helped  Depression, recurrent ([facility] [address] )  Patient felt no different with zoloft  Will send to psychiatry as nothing has worked in the past      Class 1 obesity due to excess calories without serious comorbidity with body mass index (BMI) of 32 0 to 32 9 in adult  Patient is requesting referral to weight management  Advised weight loss with diet and exercise  Visit Diagnosis:  Diagnoses and all orders for this visit:    Encounter for screening mammogram for malignant neoplasm of breast  -     Mammo screening bilateral w 3d & cad; Future  -     Ambulatory Referral to Psychiatry; Future    Attention and concentration deficit  -     amphetamine-dextroamphetamine (ADDERALL XR, 30MG,) 30 MG 24 hr capsule; Take 1 capsule (30 mg total) by mouth every morning Max Daily Amount: 30 mg    Depression, unspecified depression type  -     Ambulatory Referral to Psychiatry; Future    Depression, recurrent ([facility] [address] )    Bilateral carpal tunnel syndrome  -     Ambulatory Referral to Hand Surgery; Future    Class 1 obesity due to excess calories without serious comorbidity with body mass index (BMI) of 32 0 to 32 9 in adult  -     Ambulatory Referral to Weight Management; Future          Subjective:    Patient ID: April M Ct Martinez is a 42 y o  female  Patient presents for routine follow up  She feels nothing has ever helped for her anxiety and depression  She tried wellbutrin, zoloft, prozac and buspar  She was recently started on Adderall for ADHD and feels it is helping somewhat  She is very upset with her weight and would like to talk to weight management about possible options  She had carpal tunnel surgery in both of her hands  Her hands are bothering her  Her surgery about 5-7 years, it did help at first  Her hands are sore all the time     She is not sleeping well  She has no energy  The following portions of the patient's history were reviewed and updated as appropriate: allergies, current medications, past family history, past medical history, past social history, past surgical history and problem list     Review of Systems   Constitutional: Positive for fatigue  Negative for chills and fever  HENT: Negative for ear pain and sore throat  Eyes: Negative for pain and visual disturbance  Respiratory: Negative for cough and shortness of breath  Cardiovascular: Negative for chest pain and palpitations  Gastrointestinal: Negative for abdominal pain and vomiting  Genitourinary: Negative for dysuria and hematuria  Musculoskeletal: Negative for arthralgias and back pain  Skin: Negative for color change and rash  Neurological: Negative for seizures and syncope  Psychiatric/Behavioral: Positive for decreased concentration, dysphoric mood and sleep disturbance  The patient is nervous/anxious  All other systems reviewed and are negative          /80   Pulse 102   Temp 98 °F (36 7 °C)   Ht 5' 4" (1 626 m)   Wt 86 2 kg (190 lb)   SpO2 98%   BMI 32 61 kg/m²   Social History     Socioeconomic History    Marital status: Single     Spouse name: Not on file    Number of children: 2    Years of education: Not on file    Highest education level: Not on file   Occupational History    Occupation: [employer]   Tobacco Use    Smoking status: Never Smoker    Smokeless tobacco: Never Used   Vaping Use    Vaping Use: Never used   Substance and Sexual Activity    Alcohol use: No    Drug use: No    Sexual activity: Not on file   Other Topics Concern    Not on file   Social History Narrative    Daily coffee consumption     Social Determinants of Health     Financial Resource Strain: Not on file   Food Insecurity: Not on file   Transportation Needs: Not on file   Physical Activity: Not on file   Stress: Not on file   Social Connections: Not on file   Intimate Partner Violence: Not on file   Housing Stability: Not on file     Past Medical History:   Diagnosis Date    Anxiety     Chronic pain disorder     lumbar radiculopathy    Depression     Headache      Family History   Problem Relation Age of Onset    Depression Mother     Breast cancer Maternal Grandmother     Cancer Family      Past Surgical History:   Procedure Laterality Date    CERVICAL BIOPSY  W/ LOOP ELECTRODE EXCISION      HERNIA REPAIR      HERNIA REPAIR Left 9/27/2017    Procedure: LAPAROSCOPIC INGUINAL HERNIA REPAIR WITH MESH;  Surgeon: Alexandria Campos MD;  Location: [facility];  Service: General    [place] ESOPHAGOGASTRODUODENOSCOPY TRANSORAL DIAGNOSTIC N/A 2/24/2017    Procedure: EGD AND COLONOSCOPY;  Surgeon: Barnie Gosselin, MD;  Location: [facility]; Service: Gastroenterology       Current Outpatient Medications:     amitriptyline (ELAVIL) 75 mg tablet, Take 1 tablet (75 mg total) by mouth daily at bedtime, Disp: 90 tablet, Rfl: 1    hydrOXYzine (VISTARIL) 100 MG capsule, Take 1 capsule (100 mg total) by mouth 3 (three) times a day as needed for anxiety, Disp: 30 capsule, Rfl: 0    sertraline (ZOLOFT) 25 mg tablet, TAKE 1 TABLET (25 MG TOTAL) BY MOUTH DAILY  , Disp: 30 tablet, Rfl: 0    amphetamine-dextroamphetamine (ADDERALL XR, 30MG,) 30 MG 24 hr capsule, Take 1 capsule (30 mg total) by mouth every morning Max Daily Amount: 30 mg, Disp: 30 capsule, Rfl: 0    Allergies   Allergen Reactions    Codeine Other (See Comments)    Tylenol Cold Severe Congestion [Pseudoephedrine-Dm-Gg-Apap]           Lab Review   No visits with results within 2 Month(s) from this visit  Latest known visit with results is:   Office Visit on 10/11/2021   Component Date Value    SARS-CoV-2 10/11/2021 Positive*        Imaging: No results found  Objective:     Physical Exam  Constitutional:       Appearance: She is well-developed     Cardiovascular:      Rate and Rhythm: Normal rate and regular rhythm  Heart sounds: Normal heart sounds  No murmur heard  Pulmonary:      Effort: Pulmonary effort is normal  No respiratory distress  Breath sounds: Normal breath sounds  Skin:     General: Skin is warm and dry  Neurological:      Mental Status: She is alert and oriented to person, place, and time  There are no Patient Instructions on file for this visit  VAMSHI Patel    Portions of the record may have been created with voice recognition software  Occasional wrong word or "sound a like" substitutions may have occurred due to the inherent limitations of voice recognition software  Read the chart carefully and recognize, using context, where substitutions have occurred

## 2022-03-15 NOTE — ASSESSMENT & PLAN NOTE
Will increase adderall to 30mg daily  Advised to make appointment with psychiatry as she feels nothing has helped

## 2022-03-23 DIAGNOSIS — F32.A DEPRESSION, UNSPECIFIED DEPRESSION TYPE: ICD-10-CM

## 2022-03-23 RX ORDER — SERTRALINE HYDROCHLORIDE 25 MG/1
25 TABLET, FILM COATED ORAL DAILY
Qty: 90 TABLET | Refills: 1 | Status: SHIPPED | OUTPATIENT
Start: 2022-03-23 | End: 2022-07-21

## 2022-04-14 NOTE — ASSESSMENT & PLAN NOTE
- Sleep 8 hours nightly  - Avoid Caffeine, smoking, and alcohol   - Exercise daily, but don't exercise, eat or drink large amounts of liquid within 2 hours of your bedtime.  - Don't watch TV in bed. Don't use any screens within a 2 hours before bed  - Don't nap during the day    - if anxiety still an issue over the next few months, reach out and we can start venlafaxine (effexor)    Get blood tests   Will start on Zoloft daily  Will return in 4 weeks  Advised to use hydroxyzine as needed

## 2022-05-09 DIAGNOSIS — R41.840 ATTENTION AND CONCENTRATION DEFICIT: ICD-10-CM

## 2022-05-10 RX ORDER — DEXTROAMPHETAMINE SACCHARATE, AMPHETAMINE ASPARTATE MONOHYDRATE, DEXTROAMPHETAMINE SULFATE AND AMPHETAMINE SULFATE 7.5; 7.5; 7.5; 7.5 MG/1; MG/1; MG/1; MG/1
30 CAPSULE, EXTENDED RELEASE ORAL EVERY MORNING
Qty: 30 CAPSULE | Refills: 0 | Status: SHIPPED | OUTPATIENT
Start: 2022-05-10 | End: 2022-06-10 | Stop reason: SDUPTHER

## 2022-06-10 DIAGNOSIS — R41.840 ATTENTION AND CONCENTRATION DEFICIT: ICD-10-CM

## 2022-06-10 RX ORDER — DEXTROAMPHETAMINE SACCHARATE, AMPHETAMINE ASPARTATE MONOHYDRATE, DEXTROAMPHETAMINE SULFATE AND AMPHETAMINE SULFATE 7.5; 7.5; 7.5; 7.5 MG/1; MG/1; MG/1; MG/1
30 CAPSULE, EXTENDED RELEASE ORAL EVERY MORNING
Qty: 30 CAPSULE | Refills: 0 | Status: SHIPPED | OUTPATIENT
Start: 2022-06-10 | End: 2022-07-21 | Stop reason: SDUPTHER

## 2022-07-21 ENCOUNTER — OFFICE VISIT (OUTPATIENT)
Dept: FAMILY MEDICINE CLINIC | Facility: CLINIC | Age: 47
End: 2022-07-21
Payer: COMMERCIAL

## 2022-07-21 VITALS
HEIGHT: 64 IN | WEIGHT: 171.4 LBS | HEART RATE: 91 BPM | BODY MASS INDEX: 29.26 KG/M2 | OXYGEN SATURATION: 98 % | SYSTOLIC BLOOD PRESSURE: 122 MMHG | TEMPERATURE: 98 F | DIASTOLIC BLOOD PRESSURE: 88 MMHG

## 2022-07-21 DIAGNOSIS — R23.2 HOT FLASHES: ICD-10-CM

## 2022-07-21 DIAGNOSIS — F33.9 DEPRESSION, RECURRENT (HCC): Primary | ICD-10-CM

## 2022-07-21 DIAGNOSIS — G44.219 EPISODIC TENSION-TYPE HEADACHE, NOT INTRACTABLE: ICD-10-CM

## 2022-07-21 DIAGNOSIS — R41.840 ATTENTION AND CONCENTRATION DEFICIT: ICD-10-CM

## 2022-07-21 DIAGNOSIS — F41.9 ANXIETY: ICD-10-CM

## 2022-07-21 DIAGNOSIS — G56.03 BILATERAL CARPAL TUNNEL SYNDROME: ICD-10-CM

## 2022-07-21 PROCEDURE — 99214 OFFICE O/P EST MOD 30 MIN: CPT | Performed by: NURSE PRACTITIONER

## 2022-07-21 PROCEDURE — 3725F SCREEN DEPRESSION PERFORMED: CPT | Performed by: NURSE PRACTITIONER

## 2022-07-21 RX ORDER — HYDROXYZINE PAMOATE 100 MG/1
100 CAPSULE ORAL 3 TIMES DAILY PRN
Qty: 30 CAPSULE | Refills: 0 | Status: SHIPPED | OUTPATIENT
Start: 2022-07-21 | End: 2022-07-22 | Stop reason: SDUPTHER

## 2022-07-21 RX ORDER — VENLAFAXINE HYDROCHLORIDE 37.5 MG/1
37.5 CAPSULE, EXTENDED RELEASE ORAL
Qty: 30 CAPSULE | Refills: 0 | Status: SHIPPED | OUTPATIENT
Start: 2022-07-21 | End: 2022-07-21 | Stop reason: SDUPTHER

## 2022-07-21 RX ORDER — VENLAFAXINE HYDROCHLORIDE 37.5 MG/1
37.5 CAPSULE, EXTENDED RELEASE ORAL
Qty: 30 CAPSULE | Refills: 0 | Status: SHIPPED | OUTPATIENT
Start: 2022-07-21 | End: 2022-08-15 | Stop reason: SDUPTHER

## 2022-07-21 RX ORDER — AMITRIPTYLINE HYDROCHLORIDE 75 MG/1
75 TABLET, FILM COATED ORAL
Qty: 90 TABLET | Refills: 1 | Status: SHIPPED | OUTPATIENT
Start: 2022-07-21

## 2022-07-21 RX ORDER — DEXTROAMPHETAMINE SACCHARATE, AMPHETAMINE ASPARTATE MONOHYDRATE, DEXTROAMPHETAMINE SULFATE AND AMPHETAMINE SULFATE 7.5; 7.5; 7.5; 7.5 MG/1; MG/1; MG/1; MG/1
30 CAPSULE, EXTENDED RELEASE ORAL EVERY MORNING
Qty: 30 CAPSULE | Refills: 0 | Status: SHIPPED | OUTPATIENT
Start: 2022-07-21 | End: 2022-09-13 | Stop reason: SDUPTHER

## 2022-07-21 NOTE — ASSESSMENT & PLAN NOTE
Will start of effexor daily  Continue vistaril prn  Will return in 1 month  Advised to call Dr Bunny Muro office to see if they take her insurance

## 2022-07-21 NOTE — PROGRESS NOTES
BMI Counseling: Body mass index is 29 42 kg/m²  The BMI is above normal  Nutrition recommendations include decreasing portion sizes, encouraging healthy choices of fruits and vegetables and moderation in carbohydrate intake  Exercise recommendations include moderate physical activity 150 minutes/week and exercising 3-5 times per week  Rationale for BMI follow-up plan is due to patient being overweight or obese  Assessment/Plan:     Chronic Problems:  Episodic tension-type headache, not intractable  More manageable with Amitriptyline  Anxiety  Will start of effexor daily  Continue vistaril prn  Will return in 1 month  Advised to call Dr Dominga Casiano office to see if they take her insurance  Attention and concentration deficit  Doing OK with adderall  BMI 29 0-29 9,adult  She lost 19lbs since her last visit  She states this is due to stress  Visit Diagnosis:  Diagnoses and all orders for this visit:    Depression, recurrent ([facility] [address] )    Anxiety  -     venlafaxine (EFFEXOR-XR) 37 5 mg 24 hr capsule; Take 1 capsule (37 5 mg total) by mouth daily with breakfast  -     hydrOXYzine (VISTARIL) 100 MG capsule; Take 1 capsule (100 mg total) by mouth 3 (three) times a day as needed for anxiety    Hot flashes  -     venlafaxine (EFFEXOR-XR) 37 5 mg 24 hr capsule; Take 1 capsule (37 5 mg total) by mouth daily with breakfast    Bilateral carpal tunnel syndrome  -     Ambulatory Referral to Hand Surgery; Future    Attention and concentration deficit  -     amphetamine-dextroamphetamine (ADDERALL XR, 30MG,) 30 MG 24 hr capsule; Take 1 capsule (30 mg total) by mouth every morning Max Daily Amount: 30 mg    Episodic tension-type headache, not intractable    BMI 29 0-29 9,adult          Subjective:    Patient ID: April M Blayne Diaz is a 41 y o  female  Patient presents for follow up  Hot flashes, feels she cannot deal with anyone  She is trying to find psychiatrist  Anxiety is 8-9/10, depression 10/10   Sleeping ok sometimes  Feels she may be perimenopausal, lmp 1 month ago  Hands are very sore  She had carpal tunnel surgery, Dr  Rebeca Sacks  Does not want to go to [place]  She does have some nasal congestion, uses saline, flushes, benadryl, moreso at night  The following portions of the patient's history were reviewed and updated as appropriate: allergies, current medications, past family history, past medical history, past social history, past surgical history and problem list     Review of Systems   Constitutional: Negative for chills and fever  Hot flashes   HENT: Positive for sinus pressure  Negative for ear pain and sore throat  Eyes: Negative for pain and visual disturbance  Respiratory: Negative for cough and shortness of breath  Cardiovascular: Negative for chest pain and palpitations  Gastrointestinal: Negative for abdominal pain and vomiting  Genitourinary: Negative for dysuria and hematuria  Musculoskeletal: Negative for arthralgias and back pain  Skin: Negative for color change and rash  Neurological: Positive for headaches (better)  Negative for dizziness and light-headedness  Psychiatric/Behavioral: Positive for dysphoric mood  The patient is nervous/anxious  All other systems reviewed and are negative          /88 (BP Location: Left arm, Patient Position: Sitting)   Pulse 91   Temp 98 °F (36 7 °C) (Tympanic)   Ht 5' 4" (1 626 m)   Wt 77 7 kg (171 lb 6 4 oz)   SpO2 98%   BMI 29 42 kg/m²   Social History     Socioeconomic History    Marital status: Single     Spouse name: Not on file    Number of children: 2    Years of education: Not on file    Highest education level: Not on file   Occupational History    Occupation: [employer]   Tobacco Use    Smoking status: Never Smoker    Smokeless tobacco: Never Used   Vaping Use    Vaping Use: Never used   Substance and Sexual Activity    Alcohol use: No    Drug use: No    Sexual activity: Not on file   Other Topics Concern  Not on file   Social History Narrative    Daily coffee consumption     Social Determinants of Health     Financial Resource Strain: Not on file   Food Insecurity: Not on file   Transportation Needs: Not on file   Physical Activity: Not on file   Stress: Not on file   Social Connections: Not on file   Intimate Partner Violence: Not on file   Housing Stability: Not on file     Past Medical History:   Diagnosis Date    Anxiety     Chronic pain disorder     lumbar radiculopathy    Depression     Headache      Family History   Problem Relation Age of Onset    Depression Mother     Breast cancer Maternal Grandmother     Cancer Family      Past Surgical History:   Procedure Laterality Date    CERVICAL BIOPSY  W/ LOOP ELECTRODE EXCISION      HERNIA REPAIR      HERNIA REPAIR Left 9/27/2017    Procedure: LAPAROSCOPIC INGUINAL HERNIA REPAIR WITH MESH;  Surgeon: Aurora Stacy MD;  [facility]: [facility];  Service: General    [place] ESOPHAGOGASTRODUODENOSCOPY TRANSORAL DIAGNOSTIC N/A 2/24/2017    Procedure: EGD AND COLONOSCOPY;  Surgeon: Samantha Langston MD;  Location: [facility];   Service: Gastroenterology       Current Outpatient Medications:     amitriptyline (ELAVIL) 75 mg tablet, Take 1 tablet (75 mg total) by mouth daily at bedtime, Disp: 90 tablet, Rfl: 1    amphetamine-dextroamphetamine (ADDERALL XR, 30MG,) 30 MG 24 hr capsule, Take 1 capsule (30 mg total) by mouth every morning Max Daily Amount: 30 mg, Disp: 30 capsule, Rfl: 0    hydrOXYzine (VISTARIL) 100 MG capsule, Take 1 capsule (100 mg total) by mouth 3 (three) times a day as needed for anxiety, Disp: 30 capsule, Rfl: 0    venlafaxine (EFFEXOR-XR) 37 5 mg 24 hr capsule, Take 1 capsule (37 5 mg total) by mouth daily with breakfast, Disp: 30 capsule, Rfl: 0    Allergies   Allergen Reactions    Codeine Other (See Comments)    Tylenol Cold Severe Congestion [Pseudoephedrine-Dm-Gg-Apap]           Lab Review   No visits with results within 2 Month(s) from this visit  Latest known visit with results is:   Office Visit on 10/11/2021   Component Date Value    SARS-CoV-2 10/11/2021 Positive (A)        Imaging: No results found  Objective:     Physical Exam  Constitutional:       Appearance: She is well-developed  Cardiovascular:      Rate and Rhythm: Normal rate and regular rhythm  Heart sounds: Normal heart sounds  No murmur heard  Pulmonary:      Effort: Pulmonary effort is normal  No respiratory distress  Breath sounds: Normal breath sounds  Skin:     General: Skin is warm and dry  Neurological:      Mental Status: She is alert and oriented to person, place, and time  There are no Patient Instructions on file for this visit  VAMSHI Patel    Portions of the record may have been created with voice recognition software  Occasional wrong word or "sound a like" substitutions may have occurred due to the inherent limitations of voice recognition software  Read the chart carefully and recognize, using context, where substitutions have occurred

## 2022-07-22 DIAGNOSIS — F41.9 ANXIETY: ICD-10-CM

## 2022-07-22 RX ORDER — HYDROXYZINE PAMOATE 100 MG/1
100 CAPSULE ORAL 3 TIMES DAILY PRN
Qty: 30 CAPSULE | Refills: 0 | Status: SHIPPED | OUTPATIENT
Start: 2022-07-22 | End: 2022-08-04

## 2022-07-22 NOTE — TELEPHONE ENCOUNTER
Pt called -  is asking to resend prescription for hydrOXYzine (VISTARIL) 100 MG capsule     Pharmacy states they did not receive script from yesterday (@8 20 AM 7/20/2022)  Pt is completely out of the meds - asking to expedite her request      Please advise

## 2022-08-04 ENCOUNTER — TELEPHONE (OUTPATIENT)
Dept: FAMILY MEDICINE CLINIC | Facility: CLINIC | Age: 47
End: 2022-08-04

## 2022-08-04 DIAGNOSIS — F41.9 ANXIETY: ICD-10-CM

## 2022-08-04 RX ORDER — HYDROXYZINE PAMOATE 100 MG/1
CAPSULE ORAL
Qty: 30 CAPSULE | Refills: 0 | Status: SHIPPED | OUTPATIENT
Start: 2022-08-04

## 2022-08-04 NOTE — TELEPHONE ENCOUNTER
Left message to see if patient was able to  medication or if she would like for us to send it to a different pharmacy being that is is on back order

## 2022-08-15 DIAGNOSIS — R23.2 HOT FLASHES: ICD-10-CM

## 2022-08-15 DIAGNOSIS — F41.9 ANXIETY: ICD-10-CM

## 2022-08-15 RX ORDER — VENLAFAXINE HYDROCHLORIDE 37.5 MG/1
37.5 CAPSULE, EXTENDED RELEASE ORAL
Qty: 90 CAPSULE | Refills: 1 | Status: SHIPPED | OUTPATIENT
Start: 2022-08-15

## 2022-09-13 DIAGNOSIS — R41.840 ATTENTION AND CONCENTRATION DEFICIT: ICD-10-CM

## 2022-09-13 RX ORDER — DEXTROAMPHETAMINE SACCHARATE, AMPHETAMINE ASPARTATE MONOHYDRATE, DEXTROAMPHETAMINE SULFATE AND AMPHETAMINE SULFATE 7.5; 7.5; 7.5; 7.5 MG/1; MG/1; MG/1; MG/1
30 CAPSULE, EXTENDED RELEASE ORAL EVERY MORNING
Qty: 30 CAPSULE | Refills: 0 | Status: SHIPPED | OUTPATIENT
Start: 2022-09-13

## 2022-10-13 NOTE — PROGRESS NOTES
Assessment/Plan:    The CT scan was relatively unrevealing  She does feel a little more reassured at least that it is negative  The radiologist suggest an ultrasound as well to determine if there is inguinal canal pathology  She is amenable  Subjective:      Patient ID: Cori Holloway is a 45 y o  female  Triage Notes: pt is here to discuss her Ct- Scan results  Pt c/o pain and discomfort of a level (10)  Bowels are normal  Eating ok  Some nausea and vomiting  No fevers  Ms Britt Kent is presenting to followup her groin pain  She does still have the discomfort  She is here to discuss her Ct  Scan results  Bowels are normal  Eating ok  Some nausea and vomiting  No fevers  Review of Systems   Constitutional: Negative for activity change and appetite change  HENT: Negative for congestion, hearing loss, sore throat and trouble swallowing  Eyes: Negative for discharge and visual disturbance  Respiratory: Negative for cough, chest tightness, shortness of breath and wheezing  Cardiovascular: Negative for chest pain and palpitations  Gastrointestinal: Negative for abdominal pain  Endocrine: Negative for cold intolerance and heat intolerance  Genitourinary: Negative for difficulty urinating  Musculoskeletal: Negative for gait problem  Skin: Negative for color change, rash and wound  Neurological: Negative for dizziness, speech difficulty and headaches  Psychiatric/Behavioral: Negative for behavioral problems and confusion  The patient is not nervous/anxious  Objective: There were no vitals taken for this visit  Physical Exam   Constitutional: She is oriented to person, place, and time  She appears well-developed and well-nourished  No distress  HENT:   Head: Normocephalic and atraumatic  Eyes: Pupils are equal, round, and reactive to light  EOM are normal    Neck: Normal range of motion  Neck supple  Cardiovascular: Normal rate and regular rhythm  Pulmonary/Chest: Effort normal and breath sounds normal    Abdominal: Soft  She exhibits no mass  There is tenderness (RLQ)  No hernia  Genitourinary:   Genitourinary Comments: ridge/ dense scar in the right groin along the incision   Musculoskeletal: Normal range of motion  Neurological: She is alert and oriented to person, place, and time  Skin: Skin is warm and dry  She is not diaphoretic  Psychiatric: She has a normal mood and affect  Nursing note and vitals reviewed  I will SWITCH the dose or number of times a day I take the medications listed below when I get home from the hospital:  None

## 2023-02-16 ENCOUNTER — OFFICE VISIT (OUTPATIENT)
Dept: FAMILY MEDICINE CLINIC | Facility: CLINIC | Age: 48
End: 2023-02-16

## 2023-02-16 VITALS
TEMPERATURE: 98.6 F | OXYGEN SATURATION: 98 % | WEIGHT: 181 LBS | BODY MASS INDEX: 30.9 KG/M2 | HEART RATE: 102 BPM | SYSTOLIC BLOOD PRESSURE: 112 MMHG | HEIGHT: 64 IN | DIASTOLIC BLOOD PRESSURE: 84 MMHG

## 2023-02-16 DIAGNOSIS — R41.840 ATTENTION AND CONCENTRATION DEFICIT: ICD-10-CM

## 2023-02-16 DIAGNOSIS — Z00.00 HEALTHCARE MAINTENANCE: ICD-10-CM

## 2023-02-16 DIAGNOSIS — F33.9 DEPRESSION, RECURRENT (HCC): ICD-10-CM

## 2023-02-16 DIAGNOSIS — M25.522 LEFT ELBOW PAIN: Primary | ICD-10-CM

## 2023-02-16 DIAGNOSIS — F41.9 ANXIETY: ICD-10-CM

## 2023-02-16 RX ORDER — MELOXICAM 15 MG/1
15 TABLET ORAL DAILY
Qty: 30 TABLET | Refills: 0 | Status: SHIPPED | OUTPATIENT
Start: 2023-02-16

## 2023-02-16 RX ORDER — LORAZEPAM 0.5 MG/1
TABLET ORAL EVERY 8 HOURS PRN
COMMUNITY

## 2023-02-16 NOTE — PROGRESS NOTES
Assessment/Plan:     Chronic Problems:  Depression, recurrent ([address] )  Continue with [facility] for psychiatry  Attention and concentration deficit  Advised to discuss with [facility]  Visit Diagnosis:  Diagnoses and all orders for this visit:    Left elbow pain  Comments:  Meloxicam daily, xray, refer to ortho for continued pain  Orders:  -     XR elbow 2 vw left; Future  -     Ambulatory Referral to Orthopedic Surgery; Future  -     meloxicam (Mobic) 15 mg tablet; Take 1 tablet (15 mg total) by mouth daily    Healthcare maintenance  -     CBC and differential; Future  -     Comprehensive metabolic panel; Future  -     Lipid panel; Future  -     TSH, 3rd generation; Future    Attention and concentration deficit    Depression, recurrent (HCC)    Anxiety    Other orders  -     LORazepam (ATIVAN) 0 5 mg tablet; Take by mouth every 8 (eight) hours as needed          Subjective:    Patient ID: April M Janee Zaragoza is a 41 y o  female  Patient presents with Left elbow pain 6-8/10, constant pain for 5 months  Denies injury  Denies swelling  Got a brace 2 days ago with some relief  Moving makes it worse, lifting at work makes it worse  She is having issues with both of her arms and dropping things  She had carpal tunnel surgery  Dr Eugenio Sosa with [facility]  Seeing psychiatry and is lost how to help her  Seeing Jacqueline Boyle  The following portions of the patient's history were reviewed and updated as appropriate: allergies, current medications, past family history, past medical history, past social history, past surgical history and problem list     Review of Systems   Constitutional: Negative for chills and fever  HENT: Negative for ear pain and sore throat  Eyes: Negative for pain and visual disturbance  Respiratory: Negative for cough and shortness of breath  Cardiovascular: Negative for chest pain and palpitations  Gastrointestinal: Negative for abdominal pain and vomiting     Genitourinary: Negative for dysuria and hematuria  Musculoskeletal: Negative for arthralgias and back pain  Skin: Negative for color change and rash  Neurological: Negative for dizziness, seizures, syncope, light-headedness and headaches  Psychiatric/Behavioral: Positive for dysphoric mood (8/10)  Negative for sleep disturbance  The patient is nervous/anxious  All other systems reviewed and are negative          /84   Pulse 102   Temp 98 6 °F (37 °C)   Ht 5' 4" (1 626 m)   Wt 82 1 kg (181 lb)   SpO2 98%   BMI 31 07 kg/m²   Social History     Socioeconomic History   • Marital status: Single     Spouse name: Not on file   • Number of children: 2   • Years of education: Not on file   • Highest education level: Not on file   Occupational History   • Occupation: [employer]   Tobacco Use   • Smoking status: Never   • Smokeless tobacco: Never   Vaping Use   • Vaping Use: Never used   Substance and Sexual Activity   • Alcohol use: No   • Drug use: No   • Sexual activity: Not on file   Other Topics Concern   • Not on file   Social History Narrative    Daily coffee consumption     Social Determinants of Health     Financial Resource Strain: Not on file   Food Insecurity: Not on file   Transportation Needs: Not on file   Physical Activity: Not on file   Stress: Not on file   Social Connections: Not on file   Intimate Partner Violence: Not on file   Housing Stability: Not on file     Past Medical History:   Diagnosis Date   • Anxiety    • Chronic pain disorder     lumbar radiculopathy   • Depression    • GERD (gastroesophageal reflux disease)    • Headache    • Headache(784 0)      Family History   Problem Relation Age of Onset   • Depression Mother    • Breast cancer Maternal Grandmother    • Cancer Family      Past Surgical History:   Procedure Laterality Date   • CERVICAL BIOPSY  W/ LOOP ELECTRODE EXCISION     • HERNIA REPAIR     • HERNIA REPAIR Left 09/27/2017    Procedure: LAPAROSCOPIC INGUINAL HERNIA REPAIR WITH MESH;  Surgeon: Nathanael Yang Otto Hanna MD;  Location: [facility];  Service: General   • [place] ESOPHAGOGASTRODUODENOSCOPY TRANSORAL DIAGNOSTIC N/A 02/24/2017    Procedure: EGD AND COLONOSCOPY;  Surgeon: Severa Chase, MD;  Location: [facility]; Service: Gastroenterology   • TUBAL LIGATION         Current Outpatient Medications:   •  amitriptyline (ELAVIL) 75 mg tablet, TAKE 1 TABLET (75 MG TOTAL) BY MOUTH DAILY AT BEDTIME, Disp: 90 tablet, Rfl: 1  •  LORazepam (ATIVAN) 0 5 mg tablet, Take by mouth every 8 (eight) hours as needed, Disp: , Rfl:   •  meloxicam (Mobic) 15 mg tablet, Take 1 tablet (15 mg total) by mouth daily, Disp: 30 tablet, Rfl: 0    Allergies   Allergen Reactions   • Codeine Other (See Comments)   • Tylenol Cold Severe Congestion [Pseudoephedrine-Dm-Gg-Apap]           Lab Review   No visits with results within 2 Month(s) from this visit  Latest known visit with results is:   Office Visit on 10/11/2021   Component Date Value   • SARS-CoV-2 10/11/2021 Positive (A)         Imaging: No results found  Objective:     Physical Exam  Constitutional:       Appearance: She is well-developed  Cardiovascular:      Rate and Rhythm: Normal rate and regular rhythm  Musculoskeletal:      Left elbow: Decreased range of motion  Tenderness present  Skin:     General: Skin is warm and dry  Neurological:      Mental Status: She is alert and oriented to person, place, and time  Psychiatric:         Mood and Affect: Mood normal            There are no Patient Instructions on file for this visit  VAMSHI Patel    Portions of the record may have been created with voice recognition software  Occasional wrong word or "sound a like" substitutions may have occurred due to the inherent limitations of voice recognition software  Read the chart carefully and recognize, using context, where substitutions have occurred

## 2023-06-09 ENCOUNTER — OFFICE VISIT (OUTPATIENT)
Dept: FAMILY MEDICINE CLINIC | Facility: CLINIC | Age: 48
End: 2023-06-09
Payer: COMMERCIAL

## 2023-06-09 ENCOUNTER — TELEPHONE (OUTPATIENT)
Dept: OBGYN CLINIC | Facility: OTHER | Age: 48
End: 2023-06-09

## 2023-06-09 VITALS
WEIGHT: 184 LBS | BODY MASS INDEX: 31.41 KG/M2 | OXYGEN SATURATION: 96 % | HEIGHT: 64 IN | SYSTOLIC BLOOD PRESSURE: 110 MMHG | HEART RATE: 85 BPM | DIASTOLIC BLOOD PRESSURE: 90 MMHG

## 2023-06-09 DIAGNOSIS — M67.449 DIGITAL MUCINOUS CYST OF FINGER: ICD-10-CM

## 2023-06-09 DIAGNOSIS — Z12.31 ENCOUNTER FOR SCREENING MAMMOGRAM FOR MALIGNANT NEOPLASM OF BREAST: Primary | ICD-10-CM

## 2023-06-09 DIAGNOSIS — M79.642 PAIN IN BOTH HANDS: ICD-10-CM

## 2023-06-09 DIAGNOSIS — R63.5 WEIGHT GAIN: ICD-10-CM

## 2023-06-09 DIAGNOSIS — M25.541 ARTHRALGIA OF BOTH HANDS: ICD-10-CM

## 2023-06-09 DIAGNOSIS — G44.219 EPISODIC TENSION-TYPE HEADACHE, NOT INTRACTABLE: ICD-10-CM

## 2023-06-09 DIAGNOSIS — F33.9 DEPRESSION, RECURRENT (HCC): ICD-10-CM

## 2023-06-09 DIAGNOSIS — G43.109 MIGRAINE WITH AURA AND WITHOUT STATUS MIGRAINOSUS, NOT INTRACTABLE: ICD-10-CM

## 2023-06-09 DIAGNOSIS — R41.840 ATTENTION AND CONCENTRATION DEFICIT: ICD-10-CM

## 2023-06-09 DIAGNOSIS — R10.31 RIGHT INGUINAL PAIN: ICD-10-CM

## 2023-06-09 DIAGNOSIS — M79.641 PAIN IN BOTH HANDS: ICD-10-CM

## 2023-06-09 DIAGNOSIS — F41.9 ANXIETY: ICD-10-CM

## 2023-06-09 DIAGNOSIS — M25.542 ARTHRALGIA OF BOTH HANDS: ICD-10-CM

## 2023-06-09 DIAGNOSIS — E66.09 CLASS 1 OBESITY DUE TO EXCESS CALORIES WITHOUT SERIOUS COMORBIDITY WITH BODY MASS INDEX (BMI) OF 31.0 TO 31.9 IN ADULT: ICD-10-CM

## 2023-06-09 PROCEDURE — 99214 OFFICE O/P EST MOD 30 MIN: CPT | Performed by: NURSE PRACTITIONER

## 2023-06-09 RX ORDER — ALPRAZOLAM 0.5 MG/1
0.5 TABLET ORAL 2 TIMES DAILY PRN
Qty: 60 TABLET | Refills: 0 | Status: SHIPPED | OUTPATIENT
Start: 2023-06-09

## 2023-06-09 RX ORDER — SUMATRIPTAN 50 MG/1
50 TABLET, FILM COATED ORAL ONCE AS NEEDED
Qty: 9 TABLET | Refills: 0 | Status: SHIPPED | OUTPATIENT
Start: 2023-06-09

## 2023-06-09 RX ORDER — AMITRIPTYLINE HYDROCHLORIDE 75 MG/1
75 TABLET, FILM COATED ORAL
Qty: 90 TABLET | Refills: 1 | Status: SHIPPED | OUTPATIENT
Start: 2023-06-09

## 2023-06-09 RX ORDER — DEXTROAMPHETAMINE SACCHARATE, AMPHETAMINE ASPARTATE MONOHYDRATE, DEXTROAMPHETAMINE SULFATE AND AMPHETAMINE SULFATE 7.5; 7.5; 7.5; 7.5 MG/1; MG/1; MG/1; MG/1
30 CAPSULE, EXTENDED RELEASE ORAL EVERY MORNING
Qty: 30 CAPSULE | Refills: 0 | Status: SHIPPED | OUTPATIENT
Start: 2023-06-09

## 2023-06-09 NOTE — PROGRESS NOTES
Assessment/Plan:     Chronic Problems:  Anxiety  Advised to use xanax sparingly as needed  Attention and concentration deficit  Adderall daily  Visit Diagnosis:  Diagnoses and all orders for this visit:    Encounter for screening mammogram for malignant neoplasm of breast  -     Mammo screening bilateral w 3d & cad; Future    Episodic tension-type headache, not intractable  -     amitriptyline (ELAVIL) 75 mg tablet; Take 1 tablet (75 mg total) by mouth daily at bedtime    Attention and concentration deficit  -     amphetamine-dextroamphetamine (ADDERALL XR, 30MG,) 30 MG 24 hr capsule; Take 1 capsule (30 mg total) by mouth every morning Max Daily Amount: 30 mg    Pain in both hands  -     Ambulatory Referral to Hand Surgery; Future    Digital mucinous cyst of finger  -     Ambulatory Referral to Hand Surgery; Future    Arthralgia of both hands  -     XR hand 3+ vw left; Future  -     XR hand 3+ vw right; Future  -     Lyme Total Antibody Profile with reflex to WB; Future  -     RF Screen w/ Reflex to Titer; Future  -     C-reactive protein; Future  -     IHSAN Screen w/ Reflex to Titer/Pattern; Future    Migraine with aura and without status migrainosus, not intractable  -     Ambulatory Referral to Neurology; Future  -     SUMAtriptan (Imitrex) 50 mg tablet; Take 1 tablet (50 mg total) by mouth once as needed for migraine for up to 1 dose    Anxiety  -     ALPRAZolam (XANAX) 0 5 mg tablet; Take 1 tablet (0 5 mg total) by mouth 2 (two) times a day as needed for anxiety    Right inguinal pain  -     US groin/inguinal area; Future    Depression, recurrent (HCC)    Class 1 obesity due to excess calories without serious comorbidity with body mass index (BMI) of 31 0 to 31 9 in adult    Weight gain          Subjective:    Patient ID: April TAMI Matias is a 57 y o  female  Patient presents with several concerns  April is requesting her Adderall  She states she is no longer [place], last a few months ago   Hands "have been hurting really bad, gets a bump on her finger  It does drain  The cyst does cause her some pain  Concerned with migraines  Interested in botox  Concerned with weight gain  Steady pain in right groin after surgery  S/p hernia repair  The following portions of the patient's history were reviewed and updated as appropriate: allergies, current medications, past family history, past medical history, past social history, past surgical history and problem list     Review of Systems   Constitutional: Positive for fatigue  Negative for chills, diaphoresis and fever  HENT: Negative for ear pain and sore throat  Eyes: Positive for photophobia (with migraines)  Negative for pain and visual disturbance  Respiratory: Negative for cough and shortness of breath  Cardiovascular: Negative for chest pain and palpitations  Gastrointestinal: Positive for nausea  Negative for abdominal pain and vomiting  Genitourinary: Negative for dysuria and hematuria  Musculoskeletal: Positive for arthralgias  Negative for back pain  Skin: Negative for color change and rash  Neurological: Positive for dizziness, light-headedness and headaches  Negative for seizures and syncope  Psychiatric/Behavioral: Positive for dysphoric mood and sleep disturbance  The patient is nervous/anxious  All other systems reviewed and are negative          /90 (BP Location: Left arm, Patient Position: Sitting, Cuff Size: Standard)   Pulse 85   Ht 5' 4\" (1 626 m)   Wt 83 5 kg (184 lb)   SpO2 96%   BMI 31 58 kg/m²   Social History     Socioeconomic History   • Marital status: Single     Spouse name: Not on file   • Number of children: 2   • Years of education: Not on file   • Highest education level: Not on file   Occupational History   • Occupation: [employer]   Tobacco Use   • Smoking status: Never   • Smokeless tobacco: Never   Vaping Use   • Vaping Use: Never used   Substance and Sexual Activity   • Alcohol use: No   • Drug " use: No   • Sexual activity: Not on file   Other Topics Concern   • Not on file   Social History Narrative    Daily coffee consumption     Social Determinants of Health     Financial Resource Strain: Not on file   Food Insecurity: Not on file   Transportation Needs: Not on file   Physical Activity: Not on file   Stress: Not on file   Social Connections: Not on file   Intimate Partner Violence: Not on file   Housing Stability: Not on file     Past Medical History:   Diagnosis Date   • Anxiety    • Chronic pain disorder     lumbar radiculopathy   • Depression    • GERD (gastroesophageal reflux disease)    • Headache    • Headache(784 0)      Family History   Problem Relation Age of Onset   • Depression Mother    • Breast cancer Maternal Grandmother    • Cancer Family      Past Surgical History:   Procedure Laterality Date   • CERVICAL BIOPSY  W/ LOOP ELECTRODE EXCISION     • HERNIA REPAIR     • HERNIA REPAIR Left 09/27/2017    Procedure: LAPAROSCOPIC INGUINAL HERNIA REPAIR WITH MESH;  Surgeon: Mariia Garcia MD;  Location: [facility];  Service: General   • [place] ESOPHAGOGASTRODUODENOSCOPY TRANSORAL DIAGNOSTIC N/A 02/24/2017    Procedure: EGD AND COLONOSCOPY;  Surgeon: Mike Armstrong MD;  Location: [facility];   Service: Gastroenterology   • TUBAL LIGATION         Current Outpatient Medications:   •  ALPRAZolam (XANAX) 0 5 mg tablet, Take 1 tablet (0 5 mg total) by mouth 2 (two) times a day as needed for anxiety, Disp: 60 tablet, Rfl: 0  •  amitriptyline (ELAVIL) 75 mg tablet, Take 1 tablet (75 mg total) by mouth daily at bedtime, Disp: 90 tablet, Rfl: 1  •  amphetamine-dextroamphetamine (ADDERALL XR, 30MG,) 30 MG 24 hr capsule, Take 1 capsule (30 mg total) by mouth every morning Max Daily Amount: 30 mg, Disp: 30 capsule, Rfl: 0  •  SUMAtriptan (Imitrex) 50 mg tablet, Take 1 tablet (50 mg total) by mouth once as needed for migraine for up to 1 dose, Disp: 9 tablet, Rfl: 0    Allergies   Allergen Reactions   • "Codeine Other (See Comments)   • Tylenol Cold Severe Congestion [Pseudoephedrine-Dm-Gg-Apap]           Lab Review   No visits with results within 2 Month(s) from this visit  Latest known visit with results is:   Office Visit on 10/11/2021   Component Date Value   • SARS-CoV-2 10/11/2021 Positive (A)         Imaging: No results found  Objective:     Physical Exam  Constitutional:       Appearance: She is well-developed  Cardiovascular:      Rate and Rhythm: Normal rate and regular rhythm  Heart sounds: Normal heart sounds  No murmur heard  Pulmonary:      Effort: Pulmonary effort is normal  No respiratory distress  Breath sounds: Normal breath sounds  Musculoskeletal:      Comments: Fluid filled cyst first digit   Skin:     General: Skin is warm and dry  Neurological:      Mental Status: She is alert and oriented to person, place, and time  There are no Patient Instructions on file for this visit  VAMSHI Patel    Portions of the record may have been created with voice recognition software  Occasional wrong word or \"sound a like\" substitutions may have occurred due to the inherent limitations of voice recognition software  Read the chart carefully and recognize, using context, where substitutions have occurred    "

## 2023-06-09 NOTE — TELEPHONE ENCOUNTER
Patient is being referred to a orthopedics  Please schedule accordingly      [address]   [phone]

## 2023-06-13 ENCOUNTER — TELEPHONE (OUTPATIENT)
Dept: FAMILY MEDICINE CLINIC | Facility: CLINIC | Age: 48
End: 2023-06-13

## 2023-06-13 DIAGNOSIS — E66.09 CLASS 1 OBESITY DUE TO EXCESS CALORIES WITHOUT SERIOUS COMORBIDITY WITH BODY MASS INDEX (BMI) OF 31.0 TO 31.9 IN ADULT: Primary | ICD-10-CM

## 2023-06-13 NOTE — TELEPHONE ENCOUNTER
Patient called in and stated that she spoke with her insurance company and approve medication Saxenda and just need you to begin the pre auth  It would be through patient [facility] # [phone] Fax # [phone]  Please advise    Thanks

## 2023-06-15 DIAGNOSIS — E66.09 CLASS 1 OBESITY DUE TO EXCESS CALORIES WITHOUT SERIOUS COMORBIDITY WITH BODY MASS INDEX (BMI) OF 31.0 TO 31.9 IN ADULT: Primary | ICD-10-CM

## 2023-06-20 ENCOUNTER — TELEPHONE (OUTPATIENT)
Dept: FAMILY MEDICINE CLINIC | Facility: CLINIC | Age: 48
End: 2023-06-20

## 2023-06-20 DIAGNOSIS — E66.09 CLASS 1 OBESITY DUE TO EXCESS CALORIES WITHOUT SERIOUS COMORBIDITY WITH BODY MASS INDEX (BMI) OF 31.0 TO 31.9 IN ADULT: ICD-10-CM

## 2023-06-20 NOTE — TELEPHONE ENCOUNTER
Pt called stating that her pharmacy said to up the saxenda to 15ml per 90 days because it will be cheaper that way

## 2023-06-21 ENCOUNTER — TELEPHONE (OUTPATIENT)
Dept: FAMILY MEDICINE CLINIC | Facility: CLINIC | Age: 48
End: 2023-06-21

## 2023-06-26 ENCOUNTER — TELEPHONE (OUTPATIENT)
Dept: FAMILY MEDICINE CLINIC | Facility: CLINIC | Age: 48
End: 2023-06-26

## 2023-06-26 DIAGNOSIS — E66.09 CLASS 1 OBESITY DUE TO EXCESS CALORIES WITHOUT SERIOUS COMORBIDITY WITH BODY MASS INDEX (BMI) OF 31.0 TO 31.9 IN ADULT: Primary | ICD-10-CM

## 2023-06-26 RX ORDER — PEN NEEDLE, DIABETIC 29 G X1/2"
NEEDLE, DISPOSABLE MISCELLANEOUS DAILY
Qty: 30 EACH | Refills: 1 | Status: SHIPPED | OUTPATIENT
Start: 2023-06-26 | End: 2023-08-03 | Stop reason: SDUPTHER

## 2023-07-13 DIAGNOSIS — R41.840 ATTENTION AND CONCENTRATION DEFICIT: ICD-10-CM

## 2023-07-13 RX ORDER — DEXTROAMPHETAMINE SACCHARATE, AMPHETAMINE ASPARTATE MONOHYDRATE, DEXTROAMPHETAMINE SULFATE AND AMPHETAMINE SULFATE 7.5; 7.5; 7.5; 7.5 MG/1; MG/1; MG/1; MG/1
30 CAPSULE, EXTENDED RELEASE ORAL EVERY MORNING
Qty: 30 CAPSULE | Refills: 0 | Status: SHIPPED | OUTPATIENT
Start: 2023-07-13

## 2023-07-26 DIAGNOSIS — E66.09 CLASS 1 OBESITY DUE TO EXCESS CALORIES WITHOUT SERIOUS COMORBIDITY WITH BODY MASS INDEX (BMI) OF 31.0 TO 31.9 IN ADULT: ICD-10-CM

## 2023-07-28 RX ORDER — LIRAGLUTIDE 6 MG/ML
INJECTION, SOLUTION SUBCUTANEOUS
Refills: 1 | OUTPATIENT
Start: 2023-07-28

## 2023-08-02 ENCOUNTER — TELEPHONE (OUTPATIENT)
Dept: FAMILY MEDICINE CLINIC | Facility: CLINIC | Age: 48
End: 2023-08-02

## 2023-08-02 NOTE — TELEPHONE ENCOUNTER
Pt called and left message on [employer]. Was asking about the status of a pending refill on her saxenda medication.  Was wondering if it needed Trupti's approval or the insurance's approval.

## 2023-08-02 NOTE — TELEPHONE ENCOUNTER
Called patient and she stated that the pharmacy said it is only a 30 day supply. Can we switch it to 15ml per 90 days?

## 2023-08-03 DIAGNOSIS — E66.09 CLASS 1 OBESITY DUE TO EXCESS CALORIES WITHOUT SERIOUS COMORBIDITY WITH BODY MASS INDEX (BMI) OF 31.0 TO 31.9 IN ADULT: ICD-10-CM

## 2023-08-03 DIAGNOSIS — F41.9 ANXIETY: ICD-10-CM

## 2023-08-04 RX ORDER — PEN NEEDLE, DIABETIC 29 G X1/2"
NEEDLE, DISPOSABLE MISCELLANEOUS DAILY
Qty: 30 EACH | Refills: 0 | Status: SHIPPED | OUTPATIENT
Start: 2023-08-04

## 2023-08-04 RX ORDER — ALPRAZOLAM 0.5 MG/1
0.5 TABLET ORAL 2 TIMES DAILY PRN
Qty: 60 TABLET | Refills: 0 | Status: SHIPPED | OUTPATIENT
Start: 2023-08-04

## 2023-08-09 ENCOUNTER — TELEPHONE (OUTPATIENT)
Dept: FAMILY MEDICINE CLINIC | Facility: CLINIC | Age: 48
End: 2023-08-09

## 2023-08-09 NOTE — TELEPHONE ENCOUNTER
Spoke with patient- she just wants to know if you have heard back from the insurance company yet. Please give patient a call as soon as you can, thank you!

## 2023-08-09 NOTE — TELEPHONE ENCOUNTER
Informed patient of message sent to her regarding the backorder of [id number] [facility].   She will let us know if she finds another pharmacy

## 2023-08-25 DIAGNOSIS — R41.840 ATTENTION AND CONCENTRATION DEFICIT: ICD-10-CM

## 2023-08-25 DIAGNOSIS — F41.9 ANXIETY: ICD-10-CM

## 2023-08-25 DIAGNOSIS — E66.09 CLASS 1 OBESITY DUE TO EXCESS CALORIES WITHOUT SERIOUS COMORBIDITY WITH BODY MASS INDEX (BMI) OF 31.0 TO 31.9 IN ADULT: ICD-10-CM

## 2023-08-25 RX ORDER — DEXTROAMPHETAMINE SACCHARATE, AMPHETAMINE ASPARTATE MONOHYDRATE, DEXTROAMPHETAMINE SULFATE AND AMPHETAMINE SULFATE 7.5; 7.5; 7.5; 7.5 MG/1; MG/1; MG/1; MG/1
30 CAPSULE, EXTENDED RELEASE ORAL EVERY MORNING
Qty: 30 CAPSULE | Refills: 0 | Status: SHIPPED | OUTPATIENT
Start: 2023-08-25

## 2023-08-25 RX ORDER — PEN NEEDLE, DIABETIC 29 G X1/2"
NEEDLE, DISPOSABLE MISCELLANEOUS DAILY
Qty: 30 EACH | Refills: 0 | Status: SHIPPED | OUTPATIENT
Start: 2023-08-25

## 2023-08-25 RX ORDER — ALPRAZOLAM 0.5 MG/1
0.5 TABLET ORAL 2 TIMES DAILY PRN
Qty: 60 TABLET | Refills: 0 | Status: SHIPPED | OUTPATIENT
Start: 2023-08-25

## 2023-08-27 DIAGNOSIS — E66.09 CLASS 1 OBESITY DUE TO EXCESS CALORIES WITHOUT SERIOUS COMORBIDITY WITH BODY MASS INDEX (BMI) OF 31.0 TO 31.9 IN ADULT: ICD-10-CM

## 2023-08-27 RX ORDER — LIRAGLUTIDE 6 MG/ML
INJECTION, SOLUTION SUBCUTANEOUS
Refills: 0 | OUTPATIENT
Start: 2023-08-27

## 2023-09-14 NOTE — PROGRESS NOTES
Anesthesia Post-op Note    Patient: Nando Damon  Procedure(s) Performed: LAPAROSCOPIC HAND ASSIST ILEOCECECTOMY  Anesthesia type: General    Vitals Value Taken Time   Temp 36.5 09/14/23 1104   Pulse 88 09/14/23 1103   Resp 26 09/14/23 1103   SpO2 100 % 09/14/23 1103   /61 09/14/23 1100   Vitals shown include unvalidated device data.      Patient Location: PACU Phase 1  Post-op Vital Signs:stable  Level of Consciousness: awake, alert and participates in exam  Respiratory Status: spontaneous ventilation and face mask  Cardiovascular stable  Hydration: euvolemic  Pain Management: adequately controlled  Handoff: Handoff to receiving nurse was performed and questions were answered  Vomiting: none  Nausea: None  Airway Patency:patent  Post-op Assessment: no complications and patient tolerated procedure well      There were no known notable events for this encounter.   Assessment/Plan:     Chronic Problems:  No problem-specific Assessment & Plan notes found for this encounter  Visit Diagnosis:  Diagnoses and all orders for this visit:    Depression, unspecified depression type  -     FLUoxetine (PROzac) 10 mg capsule; Take 1 capsule (10 mg total) by mouth daily    Gastroesophageal reflux disease without esophagitis  -     pantoprazole (PROTONIX) 40 mg tablet; Take 1 tablet (40 mg total) by mouth daily before breakfast    Episodic tension-type headache, not intractable  -     amitriptyline (ELAVIL) 25 mg tablet; Take 1 tablet (25 mg total) by mouth daily at bedtime  -     Ambulatory referral to Neurology; Future    Lower abdominal pain  Comments: Will obtain US, advised to start probiotic  Orders:  -     US abdomen complete; Future    Chronic pain of both knees  Comments: Will obtain xrays and refer to ortho  Orders:  -     XR knee 3 vw left non injury; Future  -     XR ankle 3+ vw right; Future  -     Ambulatory referral to Orthopedic Surgery; Future          Subjective:    Patient ID: April M Tiana Ortiz is a 42 y o  female  Patient presents with several concerns  Issues with GERD, used to be on meds  Not sure what she was on in the past   Doesn't feel wellbutrin is working--- provigil helped in the past  No motivation, very anxious  Vistaril helps, but knocks her out  Hernia surgeries in the past--- having abd pain--- took laxatives and enema  Goes to the bathroom regularly, every other day  Lower abdominal pain, nothing related to periods, bloated, 7/10 pain  Stopped topamax--- bad migraines-- migraines 8-9 days in a row   Right knee pain lately, swelling, right>left      The following portions of the patient's history were reviewed and updated as appropriate: allergies, current medications, past family history, past medical history, past social history, past surgical history and problem list     Review of Systems   Constitutional: Negative for chills and fever  HENT: Negative for ear pain and sore throat  Eyes: Negative for pain and visual disturbance  Respiratory: Negative for cough and shortness of breath  Cardiovascular: Negative for chest pain and palpitations  Gastrointestinal: Positive for abdominal distention and abdominal pain  Negative for blood in stool, constipation, diarrhea, nausea and vomiting  Genitourinary: Negative for dysuria and hematuria  Musculoskeletal: Positive for arthralgias  Negative for back pain  Skin: Negative for color change and rash  Neurological: Positive for headaches  Negative for seizures and syncope  Psychiatric/Behavioral: Positive for dysphoric mood and sleep disturbance  The patient is nervous/anxious  All other systems reviewed and are negative  /78   Pulse 88   Temp 98 7 °F (37 1 °C)   Ht 5' 4" (1 626 m)   Wt 81 2 kg (179 lb)   SpO2 98%   BMI 30 73 kg/m²   Social History     Socioeconomic History    Marital status: Single     Spouse name: Not on file    Number of children: 2    Years of education: Not on file    Highest education level: Not on file   Occupational History    Occupation: [employer]   Tobacco Use    Smoking status: Never Smoker    Smokeless tobacco: Never Used   Substance and Sexual Activity    Alcohol use: No    Drug use: No    Sexual activity: Not on file   Other Topics Concern    Not on file   Social History Narrative    Daily coffee consumption     Social Determinants of Health     Financial Resource Strain:     Difficulty of Paying Living Expenses:    Food Insecurity:     Worried About Running Out of Food in the Last Year:     [address] in the Last Year:    Transportation Needs:     Lack of Transportation (Medical):      Lack of Transportation (Non-Medical):    Physical Activity:     Days of Exercise per Week:     Minutes of Exercise per Session:    Stress:     Feeling of Stress :    Social Connections:     Frequency of Communication with Friends and Family:     Frequency of Social Gatherings with Friends and Family:     Attends Church Services:     Active Member of Clubs or Organizations:     Attends Club or Organization Meetings:     Marital Status:    Intimate Partner Violence:     Fear of Current or Ex-Partner:     Emotionally Abused:     Physically Abused:     Sexually Abused:      Past Medical History:   Diagnosis Date    Anxiety     Chronic pain disorder     lumbar radiculopathy    Depression     Headache      Family History   Problem Relation Age of Onset    Depression Mother     Breast cancer Maternal Grandmother     Cancer Family      Past Surgical History:   Procedure Laterality Date    CERVICAL BIOPSY  W/ LOOP ELECTRODE EXCISION      HERNIA REPAIR      HERNIA REPAIR Left 9/27/2017    Procedure: LAPAROSCOPIC INGUINAL HERNIA REPAIR WITH MESH;  Surgeon: Tasia Sparrow MD;  Location: [facility];  Service: General    [place] ESOPHAGOGASTRODUODENOSCOPY TRANSORAL DIAGNOSTIC N/A 2/24/2017    Procedure: EGD AND COLONOSCOPY;  Surgeon: Clair Archer MD;  Location: [facility];   Service: Gastroenterology       Current Outpatient Medications:     atoMOXetine (STRATTERA) 60 mg capsule, Take 1 capsule (60 mg total) by mouth daily, Disp: 90 capsule, Rfl: 1    buPROPion (WELLBUTRIN XL) 150 mg 24 hr tablet, TAKE 1 TABLET BY MOUTH EVERY DAY IN THE MORNING, Disp: 90 tablet, Rfl: 1    buPROPion (WELLBUTRIN XL) 300 mg 24 hr tablet, Take 1 tablet (300 mg total) by mouth every morning, Disp: 90 tablet, Rfl: 1    hydrOXYzine (VISTARIL) 100 MG capsule, Take 1 capsule (100 mg total) by mouth 3 (three) times a day as needed for anxiety, Disp: 30 capsule, Rfl: 0    phentermine 37 5 MG capsule, Take 1 capsule (37 5 mg total) by mouth every morning, Disp: 30 capsule, Rfl: 1    amitriptyline (ELAVIL) 25 mg tablet, Take 1 tablet (25 mg total) by mouth daily at bedtime, Disp: 30 tablet, Rfl: 0    FLUoxetine (PROzac) 10 mg capsule, Take 1 capsule (10 mg total) by mouth daily, Disp: 30 capsule, Rfl: 0    pantoprazole (PROTONIX) 40 mg tablet, Take 1 tablet (40 mg total) by mouth daily before breakfast, Disp: 90 tablet, Rfl: 0    Allergies   Allergen Reactions    Codeine Other (See Comments)    Tylenol Cold Severe Congestion [Pseudoephedrine-Dm-Gg-Apap]           Lab Review   No visits with results within 2 Month(s) from this visit  Latest known visit with results is:   Orders Only on 11/06/2020   Component Date Value    SARS-CoV-2  11/06/2020 Not Detected         Imaging: No results found  Objective:     Physical Exam  Constitutional:       Appearance: She is well-developed  Cardiovascular:      Rate and Rhythm: Normal rate and regular rhythm  Heart sounds: Normal heart sounds  No murmur heard  Pulmonary:      Effort: Pulmonary effort is normal  No respiratory distress  Breath sounds: Normal breath sounds  Skin:     General: Skin is warm and dry  Neurological:      Mental Status: She is alert and oriented to person, place, and time  There are no Patient Instructions on file for this visit  VAMSHI Patel    Portions of the record may have been created with voice recognition software  Occasional wrong word or "sound a like" substitutions may have occurred due to the inherent limitations of voice recognition software  Read the chart carefully and recognize, using context, where substitutions have occurred

## 2023-09-15 DIAGNOSIS — R41.840 ATTENTION AND CONCENTRATION DEFICIT: ICD-10-CM

## 2023-09-15 RX ORDER — DEXTROAMPHETAMINE SACCHARATE, AMPHETAMINE ASPARTATE MONOHYDRATE, DEXTROAMPHETAMINE SULFATE AND AMPHETAMINE SULFATE 7.5; 7.5; 7.5; 7.5 MG/1; MG/1; MG/1; MG/1
30 CAPSULE, EXTENDED RELEASE ORAL EVERY MORNING
Qty: 30 CAPSULE | Refills: 0 | Status: SHIPPED | OUTPATIENT
Start: 2023-09-15

## 2023-09-20 ENCOUNTER — TELEPHONE (OUTPATIENT)
Dept: FAMILY MEDICINE CLINIC | Facility: CLINIC | Age: 48
End: 2023-09-20

## 2023-09-29 ENCOUNTER — TELEPHONE (OUTPATIENT)
Dept: FAMILY MEDICINE CLINIC | Facility: CLINIC | Age: 48
End: 2023-09-29

## 2023-09-29 NOTE — TELEPHONE ENCOUNTER
Called patient and left message to call back to let them know that her medication is already approved.  Called pharmacy and they let me know, they just don't have the saxenda in stock still

## 2023-11-09 DIAGNOSIS — E66.09 CLASS 1 OBESITY DUE TO EXCESS CALORIES WITHOUT SERIOUS COMORBIDITY WITH BODY MASS INDEX (BMI) OF 31.0 TO 31.9 IN ADULT: ICD-10-CM

## 2023-11-09 RX ORDER — PEN NEEDLE, DIABETIC 29 G X1/2"
NEEDLE, DISPOSABLE MISCELLANEOUS
Qty: 100 EACH | Refills: 1 | Status: SHIPPED | OUTPATIENT
Start: 2023-11-09

## 2023-11-15 DIAGNOSIS — R41.840 ATTENTION AND CONCENTRATION DEFICIT: ICD-10-CM

## 2023-11-15 NOTE — TELEPHONE ENCOUNTER
Patient stated she needs a refill for her adderall, she said the 30mg is not working for her. She understands it is hard to get adderall anywhere and if her pharmacy doesn't have it can she use provigil?  Also is there anyway can you discontinue the needles because she does not have the saxenda since it is on back order

## 2023-11-16 RX ORDER — DEXTROAMPHETAMINE SACCHARATE, AMPHETAMINE ASPARTATE MONOHYDRATE, DEXTROAMPHETAMINE SULFATE AND AMPHETAMINE SULFATE 7.5; 7.5; 7.5; 7.5 MG/1; MG/1; MG/1; MG/1
30 CAPSULE, EXTENDED RELEASE ORAL EVERY MORNING
Qty: 30 CAPSULE | Refills: 0 | OUTPATIENT
Start: 2023-11-16

## 2023-12-21 ENCOUNTER — OFFICE VISIT (OUTPATIENT)
Dept: FAMILY MEDICINE CLINIC | Facility: CLINIC | Age: 48
End: 2023-12-21
Payer: COMMERCIAL

## 2023-12-21 VITALS
BODY MASS INDEX: 32.1 KG/M2 | DIASTOLIC BLOOD PRESSURE: 78 MMHG | HEIGHT: 64 IN | OXYGEN SATURATION: 98 % | HEART RATE: 63 BPM | SYSTOLIC BLOOD PRESSURE: 120 MMHG | WEIGHT: 188 LBS

## 2023-12-21 DIAGNOSIS — Z12.31 ENCOUNTER FOR SCREENING MAMMOGRAM FOR MALIGNANT NEOPLASM OF BREAST: Primary | ICD-10-CM

## 2023-12-21 DIAGNOSIS — F33.9 DEPRESSION, RECURRENT (HCC): ICD-10-CM

## 2023-12-21 DIAGNOSIS — R41.840 ATTENTION AND CONCENTRATION DEFICIT: ICD-10-CM

## 2023-12-21 DIAGNOSIS — R63.5 WEIGHT GAIN: ICD-10-CM

## 2023-12-21 DIAGNOSIS — F41.9 ANXIETY: ICD-10-CM

## 2023-12-21 PROCEDURE — 99214 OFFICE O/P EST MOD 30 MIN: CPT | Performed by: NURSE PRACTITIONER

## 2023-12-21 RX ORDER — BUPROPION HYDROCHLORIDE 300 MG/1
300 TABLET ORAL EVERY MORNING
COMMUNITY
Start: 2023-10-08

## 2023-12-21 RX ORDER — ALPRAZOLAM 0.5 MG/1
0.5 TABLET ORAL 2 TIMES DAILY PRN
Qty: 60 TABLET | Refills: 0 | Status: SHIPPED | OUTPATIENT
Start: 2023-12-21

## 2023-12-21 RX ORDER — QUETIAPINE FUMARATE 100 MG/1
TABLET, FILM COATED ORAL
COMMUNITY
Start: 2023-10-08

## 2023-12-21 RX ORDER — DEXTROAMPHETAMINE SACCHARATE, AMPHETAMINE ASPARTATE MONOHYDRATE, DEXTROAMPHETAMINE SULFATE AND AMPHETAMINE SULFATE 7.5; 7.5; 7.5; 7.5 MG/1; MG/1; MG/1; MG/1
30 CAPSULE, EXTENDED RELEASE ORAL EVERY MORNING
Qty: 30 CAPSULE | Refills: 0 | Status: CANCELLED | OUTPATIENT
Start: 2023-12-21

## 2023-12-21 RX ORDER — DEXTROAMPHETAMINE SACCHARATE, AMPHETAMINE ASPARTATE, DEXTROAMPHETAMINE SULFATE AND AMPHETAMINE SULFATE 7.5; 7.5; 7.5; 7.5 MG/1; MG/1; MG/1; MG/1
30 TABLET ORAL 2 TIMES DAILY
Qty: 60 TABLET | Refills: 0 | Status: SHIPPED | OUTPATIENT
Start: 2023-12-21

## 2023-12-21 NOTE — PROGRESS NOTES
Name: Ana M Sanchez      : 1975      MRN: [id number]  Encounter Provider: VAMSHI Patel  Encounter Date: 2023   Encounter department: [facility] [address]    Assessment & Plan     1. Encounter for screening mammogram for malignant neoplasm of breast  -     Mammo screening bilateral w 3d & cad; Future; Expected date: 2023    2. Attention and concentration deficit  Assessment & Plan:  Will change to Adderall 30 mg twice daily.    Orders:  -     amphetamine-dextroamphetamine (ADDERALL, 30MG,) 30 MG tablet; Take 1 tablet (30 mg total) by mouth 2 (two) times a day Max Daily Amount: 60 mg    3. Anxiety  Assessment & Plan:  Continue on Xanax as needed.    Orders:  -     ALPRAZolam (XANAX) 0.5 mg tablet; Take 1 tablet (0.5 mg total) by mouth 2 (two) times a day as needed for anxiety    4. Weight gain    5. Depression, recurrent (HCC)  Assessment & Plan:  Feels this is stable currently.             Subjective      Patient presents for follow up on meds.  She feels like the Adderall wears off in the afternoon.  She is otherwise feeling well.  She had blood work done since 2016.  She is not up-to-date with her mammogram as well.      Review of Systems   Constitutional:  Negative for chills and fever.   HENT:  Negative for ear pain and sore throat.    Eyes:  Negative for pain and visual disturbance.   Respiratory:  Negative for cough and shortness of breath.    Cardiovascular:  Negative for chest pain and palpitations.   Gastrointestinal:  Negative for abdominal pain and vomiting.   Genitourinary:  Negative for dysuria and hematuria.   Neurological:  Negative for dizziness, seizures, syncope, light-headedness and headaches.   Psychiatric/Behavioral:  Positive for dysphoric mood. The patient is nervous/anxious.    All other systems reviewed and are negative.      Current Outpatient Medications on File Prior to Visit   Medication Sig    amitriptyline (ELAVIL) 75 mg  "tablet Take 1 tablet (75 mg total) by mouth daily at bedtime    buPROPion (WELLBUTRIN XL) 300 mg 24 hr tablet Take 300 mg by mouth every morning    [DISCONTINUED] ALPRAZolam (XANAX) 0.5 mg tablet Take 1 tablet (0.5 mg total) by mouth 2 (two) times a day as needed for anxiety    [DISCONTINUED] amphetamine-dextroamphetamine (ADDERALL XR, 30MG,) 30 MG 24 hr capsule Take 1 capsule (30 mg total) by mouth every morning Max Daily Amount: 30 mg    QUEtiapine (SEROquel) 100 mg tablet TAKE 1 TABLET (100 MG) BY MOUTH AT BEDTIME AS NEEDED FOR INSOMNIA (Patient not taking: Reported on 12/21/2023)    SUMAtriptan (Imitrex) 50 mg tablet Take 1 tablet (50 mg total) by mouth once as needed for migraine for up to 1 dose (Patient not taking: Reported on 12/21/2023)    [DISCONTINUED] Insulin Pen Needle (BD ULTRA-FINE PEN NEEDLES) 29G X 12.7MM MISC USE IN THE MORNING AS DIRECTED (Patient not taking: Reported on 12/21/2023)    [DISCONTINUED] liraglutide (SAXENDA) injection Start with 0.6 mg daily for 1 week.  If tolerating, increase by 0.6 mg weekly until max dose of 3 mg daily. (Patient not taking: Reported on 12/21/2023)       Objective     /78   Pulse 63   Ht 5' 4\" (1.626 m)   Wt 85.3 kg (188 lb)   SpO2 98%   BMI 32.27 kg/m²     Physical Exam  Constitutional:       Appearance: She is well-developed.   Cardiovascular:      Rate and Rhythm: Normal rate and regular rhythm.      Heart sounds: Normal heart sounds. No murmur heard.  Pulmonary:      Effort: Pulmonary effort is normal. No respiratory distress.      Breath sounds: Normal breath sounds.   Skin:     General: Skin is warm and dry.   Neurological:      Mental Status: She is alert and oriented to person, place, and time.       VAMSHI Patel    "

## 2024-02-15 DIAGNOSIS — F41.9 ANXIETY: ICD-10-CM

## 2024-02-15 DIAGNOSIS — R41.840 ATTENTION AND CONCENTRATION DEFICIT: ICD-10-CM

## 2024-02-15 DIAGNOSIS — G44.219 EPISODIC TENSION-TYPE HEADACHE, NOT INTRACTABLE: ICD-10-CM

## 2024-02-16 RX ORDER — AMITRIPTYLINE HYDROCHLORIDE 75 MG/1
75 TABLET ORAL
Qty: 90 TABLET | Refills: 0 | Status: SHIPPED | OUTPATIENT
Start: 2024-02-16

## 2024-02-16 RX ORDER — DEXTROAMPHETAMINE SACCHARATE, AMPHETAMINE ASPARTATE, DEXTROAMPHETAMINE SULFATE AND AMPHETAMINE SULFATE 7.5; 7.5; 7.5; 7.5 MG/1; MG/1; MG/1; MG/1
30 TABLET ORAL 2 TIMES DAILY
Qty: 60 TABLET | Refills: 0 | Status: SHIPPED | OUTPATIENT
Start: 2024-02-16

## 2024-02-16 RX ORDER — ALPRAZOLAM 0.5 MG/1
0.5 TABLET ORAL 2 TIMES DAILY PRN
Qty: 60 TABLET | Refills: 0 | Status: SHIPPED | OUTPATIENT
Start: 2024-02-16

## 2024-02-21 NOTE — PROGRESS NOTES
Assessment/Plan:     Chronic Problems:  Anxiety  Will start on Zoloft daily  Will return in 4 weeks  Advised to use hydroxyzine as needed  [facility]  Will start on Zoloft daily  Will return in 1 month for med check  Attention and concentration deficit  Will start on Adderall daily  Episodic tension-type headache, not intractable  Will increase amitriptyline at 75 mg daily  Visit Diagnosis:  Diagnoses and all orders for this visit:    Depression, unspecified depression type  -     sertraline (Zoloft) 25 mg tablet; Take 1 tablet (25 mg total) by mouth daily    Episodic tension-type headache, not intractable  -     amitriptyline (ELAVIL) 75 mg tablet; Take 1 tablet (75 mg total) by mouth daily at bedtime    Attention and concentration deficit  -     amphetamine-dextroamphetamine (ADDERALL XR, 10MG,) 10 MG 24 hr capsule; Take 1 capsule (10 mg total) by mouth every morning Max Daily Amount: 10 mg    Anxiety  -     hydrOXYzine (VISTARIL) 100 MG capsule; Take 1 capsule (100 mg total) by mouth 3 (three) times a day as needed for anxiety    Healthcare maintenance  -     CBC and differential; Future  -     Comprehensive metabolic panel; Future  -     Hemoglobin A1C; Future  -     Lipid panel; Future  -     TSH, 3rd generation with Free T4 reflex; Future    Depression, recurrent ([facility] )          Subjective:    Patient ID: April M Leo Parkinson is a 42 y o  female  Patient presents for med check  She feels the amitriptyline is working fairly well, but feels she may need an increase of the medication  Patient states her migraines are overall well controlled, but does have a migraine about once to twice a month  Chaz Jefferson is not working  She is having a lot of trouble at school and work with concentration  Stopped her wellbutrin as she felt this was not working  She is very concerned with weight gain as she states she is eating very healthy        The following portions of the patient's history were reviewed and updated as appropriate: allergies, current medications, past family history, past medical history, past social history, past surgical history and problem list     Review of Systems   Constitutional: Positive for fatigue and unexpected weight change (gain)  Negative for chills and fever  HENT: Negative for ear pain and sore throat  Eyes: Negative for pain and visual disturbance  Respiratory: Negative for cough and shortness of breath  Cardiovascular: Negative for chest pain and palpitations  Gastrointestinal: Negative for abdominal pain and vomiting  Genitourinary: Negative for dysuria and hematuria  Musculoskeletal: Negative for arthralgias and back pain  Skin: Negative for color change and rash  Neurological: Positive for headaches  Negative for seizures and syncope  Psychiatric/Behavioral: Positive for sleep disturbance  Negative for dysphoric mood, self-injury and suicidal ideas  The patient is nervous/anxious  All other systems reviewed and are negative          /84   Pulse 102   Temp 97 5 °F (36 4 °C)   Ht 5' 4" (1 626 m)   Wt 85 7 kg (189 lb)   SpO2 98%   BMI 32 44 kg/m²   Social History     Socioeconomic History    Marital status: Single     Spouse name: Not on file    Number of children: 2    Years of education: Not on file    Highest education level: Not on file   Occupational History    Occupation: [employer]   Tobacco Use    Smoking status: Never Smoker    Smokeless tobacco: Never Used   Vaping Use    Vaping Use: Never used   Substance and Sexual Activity    Alcohol use: No    Drug use: No    Sexual activity: Not on file   Other Topics Concern    Not on file   Social History Narrative    Daily coffee consumption     Social Determinants of Health     Financial Resource Strain: Not on file   Food Insecurity: Not on file   Transportation Needs: Not on file   Physical Activity: Not on file   Stress: Not on file   Social Connections: Not on file   Intimate Partner Violence: Not on file   Housing Stability: Not on file     Past Medical History:   Diagnosis Date    Anxiety     Chronic pain disorder     lumbar radiculopathy    Depression     Headache      Family History   Problem Relation Age of Onset    Depression Mother     Breast cancer Maternal Grandmother     Cancer Family      Past Surgical History:   Procedure Laterality Date    CERVICAL BIOPSY  W/ LOOP ELECTRODE EXCISION      HERNIA REPAIR      HERNIA REPAIR Left 9/27/2017    Procedure: LAPAROSCOPIC INGUINAL HERNIA REPAIR WITH MESH;  Surgeon: Elena Mcconnell MD;  Location: [facility];  Service: General    [place] ESOPHAGOGASTRODUODENOSCOPY TRANSORAL DIAGNOSTIC N/A 2/24/2017    Procedure: EGD AND COLONOSCOPY;  Surgeon: Lakshmi German MD;  Location: [facility]; Service: Gastroenterology       Current Outpatient Medications:     amitriptyline (ELAVIL) 75 mg tablet, Take 1 tablet (75 mg total) by mouth daily at bedtime, Disp: 90 tablet, Rfl: 1    hydrOXYzine (VISTARIL) 100 MG capsule, Take 1 capsule (100 mg total) by mouth 3 (three) times a day as needed for anxiety, Disp: 30 capsule, Rfl: 0    amphetamine-dextroamphetamine (ADDERALL XR, 10MG,) 10 MG 24 hr capsule, Take 1 capsule (10 mg total) by mouth every morning Max Daily Amount: 10 mg, Disp: 30 capsule, Rfl: 0    sertraline (Zoloft) 25 mg tablet, Take 1 tablet (25 mg total) by mouth daily, Disp: 30 tablet, Rfl: 0    Allergies   Allergen Reactions    Codeine Other (See Comments)    Tylenol Cold Severe Congestion [Pseudoephedrine-Dm-Gg-Apap]           Lab Review   No visits with results within 2 Month(s) from this visit  Latest known visit with results is:   Office Visit on 10/11/2021   Component Date Value    SARS-CoV-2 10/11/2021 Positive*        Imaging: No results found  Objective:     Physical Exam  Constitutional:       Appearance: She is well-developed     Cardiovascular:      Rate and Rhythm: Normal rate and regular rhythm  Heart sounds: Normal heart sounds  No murmur heard  Pulmonary:      Effort: Pulmonary effort is normal  No respiratory distress  Breath sounds: Normal breath sounds  Skin:     General: Skin is warm and dry  Neurological:      Mental Status: She is alert and oriented to person, place, and time  There are no Patient Instructions on file for this visit  VAMSHI Patel    Portions of the record may have been created with voice recognition software  Occasional wrong word or "sound a like" substitutions may have occurred due to the inherent limitations of voice recognition software  Read the chart carefully and recognize, using context, where substitutions have occurred  Additional Notes: 2/21/24 had LN2 to scalp and nose and some recurrence of lesions on scalp. Patient agrees to LN2 today Detail Level: Zone Render Risk Assessment In Note?: no

## 2024-04-10 DIAGNOSIS — F41.9 ANXIETY: ICD-10-CM

## 2024-04-10 DIAGNOSIS — R41.840 ATTENTION AND CONCENTRATION DEFICIT: ICD-10-CM

## 2024-04-11 RX ORDER — DEXTROAMPHETAMINE SACCHARATE, AMPHETAMINE ASPARTATE, DEXTROAMPHETAMINE SULFATE AND AMPHETAMINE SULFATE 7.5; 7.5; 7.5; 7.5 MG/1; MG/1; MG/1; MG/1
30 TABLET ORAL 2 TIMES DAILY
Qty: 60 TABLET | Refills: 0 | Status: SHIPPED | OUTPATIENT
Start: 2024-04-11

## 2024-04-11 RX ORDER — ALPRAZOLAM 0.5 MG/1
0.5 TABLET ORAL 2 TIMES DAILY PRN
Qty: 60 TABLET | Refills: 0 | Status: SHIPPED | OUTPATIENT
Start: 2024-04-11

## 2024-05-09 DIAGNOSIS — G44.219 EPISODIC TENSION-TYPE HEADACHE, NOT INTRACTABLE: ICD-10-CM

## 2024-05-09 RX ORDER — AMITRIPTYLINE HYDROCHLORIDE 75 MG/1
75 TABLET ORAL
Qty: 90 TABLET | Refills: 1 | Status: SHIPPED | OUTPATIENT
Start: 2024-05-09

## 2024-06-05 DIAGNOSIS — F41.9 ANXIETY: ICD-10-CM

## 2024-06-05 DIAGNOSIS — R41.840 ATTENTION AND CONCENTRATION DEFICIT: ICD-10-CM

## 2024-06-06 RX ORDER — ALPRAZOLAM 0.5 MG/1
0.5 TABLET ORAL 2 TIMES DAILY PRN
Qty: 60 TABLET | Refills: 0 | Status: SHIPPED | OUTPATIENT
Start: 2024-06-06

## 2024-06-06 RX ORDER — DEXTROAMPHETAMINE SACCHARATE, AMPHETAMINE ASPARTATE, DEXTROAMPHETAMINE SULFATE AND AMPHETAMINE SULFATE 7.5; 7.5; 7.5; 7.5 MG/1; MG/1; MG/1; MG/1
30 TABLET ORAL 2 TIMES DAILY
Qty: 60 TABLET | Refills: 0 | Status: SHIPPED | OUTPATIENT
Start: 2024-06-06

## 2024-06-14 ENCOUNTER — TELEPHONE (OUTPATIENT)
Age: 49
End: 2024-06-14

## 2024-06-14 NOTE — TELEPHONE ENCOUNTER
[address]    Name:  Earlene Ortega  MR#:  [id number]  :  1956  Account #:  [de-identified]  Date of Adm:  2017  Date of Surgery:  2017      ATTENDING SURGEON:  Saeid Diamond MD    PREOPERATIVE DIAGNOSIS:  Left groin bleeding. POSTOPERATIVE DIAGNOSIS:  Left groin bleeding. PROCEDURES PERFORMED:  Left groin exploration and washout. CULTURES:  None. SPECIMENS REMOVED:  None. DRAINS:  None. ESTIMATED BLOOD LOSS:  About 50 mL. ANESTHESIA:  general    INDICATIONS FOR THE PROCEDURE:  The patient is a 22-year-old  gentleman with a left groin that was stated to be actively bleeding. The  patient does have large amounts of welling bright-red blood within his  groin, but no spurting blood. Does not appear to be from the  anastomosis, but is enough that requires take back to the operating  room. The patient was given the risks and benefits of the procedure  including but not limited to bleeding, infection, damage to adjacent  structures, MI, stroke and death. The patient voiced understanding of  all the risks and underwent procedure. OPERATIVE FINDINGS:  No active bleeding is identified from the  anastomosis, however, the patient's heavy scar area has diffuse  oozing throughout its area. DESCRIPTION OF PROCEDURE:  The patient was correctly identified  in the preoperative hold area and taken to the operating room in stable  condition. The patient had a pre-incision time-out prior to any incision. The patient was prepped and draped in normal sterile fashion  according to CDC guidelines with aseptic technique. The patient also  have preoperative antibiotics prior to any incision. We then were able  to open him up again. We explored the wound, copiously irrigated. There was no active bleeding from the anastomosis, but there was  diffuse oozing throughout the walls of the wound and the heavy scar  area.   We did turn up our Bovie Patient called states she has poison ivy has been in the garden its been about 3-4 daysnow.  No appts available with provider, no appts with another provider.  Informed patient to go to [facility]. Pt agreed she would go.     electrocautery to 80 and attempted to  gain hemostasis this way, but there was still some residual oozing in  certain areas. We then placed Surgicel and thrombin gel foam in the  wound, held pressure for probably about 20 minutes. After that, we  then placed Tisseel 10 mg throughout the wound bed and held  pressure for an additional 15 minutes, and then once this was done we  were unable to identify any further ooze so we went ahead and placed  Adaptic overlying the Surgicel and thrombin gel foam and then placed  our wound VAC directly over. We did not attempt to close anything  over top the graft. We then placed our wound VAC dressing on. The  dressing measures 12 cm x 4 cm x 2.5 cm. We did make the patient  hypertensive into the 160s to make absolutely certain that we were not  missing any bleeds and we were unable to identify again any major  bleed from the anastomosis. The patient tolerated the procedure well  and was taken back to the ICU.         MD ALFONSO Caldwell / Berneta Mortimer  D:  07/01/2017   15:43  T:  07/03/2017   05:55  Job #:  [id number]

## 2024-06-17 ENCOUNTER — OFFICE VISIT (OUTPATIENT)
Age: 49
End: 2024-06-17
Payer: COMMERCIAL

## 2024-06-17 VITALS
TEMPERATURE: 97.8 F | DIASTOLIC BLOOD PRESSURE: 78 MMHG | HEART RATE: 92 BPM | RESPIRATION RATE: 18 BRPM | OXYGEN SATURATION: 98 % | WEIGHT: 178 LBS | SYSTOLIC BLOOD PRESSURE: 102 MMHG | BODY MASS INDEX: 30.55 KG/M2

## 2024-06-17 DIAGNOSIS — L24.7 IRRITANT CONTACT DERMATITIS DUE TO PLANT: Primary | ICD-10-CM

## 2024-06-17 PROCEDURE — S9083 URGENT CARE CENTER GLOBAL: HCPCS | Performed by: NURSE PRACTITIONER

## 2024-06-17 PROCEDURE — G0382 LEV 3 HOSP TYPE B ED VISIT: HCPCS | Performed by: NURSE PRACTITIONER

## 2024-06-17 RX ORDER — PREDNISONE 10 MG/1
TABLET ORAL
Qty: 26 TABLET | Refills: 0 | Status: SHIPPED | OUTPATIENT
Start: 2024-06-17

## 2024-06-17 NOTE — PROGRESS NOTES
[facility]        NAME: Ana M Sanchez is a 49 y.o. female  : 1975    MRN: [id number]  DATE: 2024  TIME: 9:20 AM    Assessment and Plan   Irritant contact dermatitis due to plant [L24.7]  1. Irritant contact dermatitis due to plant  predniSONE 10 mg tablet    hydrocortisone 2.5 % cream            Patient Instructions     Patient Instructions   Take medication as directed.  Recommend applying cream to affected areas, not on face.  Can take Benadryl as needed for itching, it can make you drowsy.  Oat meal soaks can be helpful.  If you develop any increased redness, rash is spreading, facial swelling, shortness of breath, difficulty breathing, fever, any new or concerning symptoms please return or proceed ER.  Advised follow-up with PCP in 3-5 days       If tests have been performed at [facility], our office will contact you with results if changes need to be made to the care plan discussed with you at the visit.  You can review your full results on [facility].    Chief Complaint     Chief Complaint   Patient presents with    Poison Ivy     Pt states she has poison ivy on her whole body since last weekend          History of Present Illness       Poison Ivy  This is a new problem. The current episode started in the past 7 days. The problem has been gradually worsening since onset. The rash is diffuse. The rash is characterized by itchiness and redness. She was exposed to plant contact. Pertinent negatives include no cough, facial edema, fatigue, fever or shortness of breath. Past treatments include anti-itch cream, antihistamine, cold compress and moisturizer. The treatment provided mild relief.       Review of Systems   Review of Systems   Constitutional:  Negative for chills, diaphoresis, fatigue and fever.   HENT:  Negative for facial swelling and trouble swallowing.    Respiratory:  Negative for cough, chest tightness, shortness of breath, wheezing and stridor.    Cardiovascular:  Negative.    Musculoskeletal:  Negative for arthralgias, back pain, joint swelling and myalgias.   Skin:  Positive for rash.         Current Medications       Current Outpatient Medications:     ALPRAZolam (XANAX) 0.5 mg tablet, Take 1 tablet (0.5 mg total) by mouth 2 (two) times a day as needed for anxiety, Disp: 60 tablet, Rfl: 0    amitriptyline (ELAVIL) 75 mg tablet, TAKE 1 TABLET (75 MG TOTAL) BY MOUTH DAILY AT BEDTIME, Disp: 90 tablet, Rfl: 1    amphetamine-dextroamphetamine (ADDERALL, 30MG,) 30 MG tablet, Take 1 tablet (30 mg total) by mouth 2 (two) times a day Max Daily Amount: 60 mg, Disp: 60 tablet, Rfl: 0    buPROPion (WELLBUTRIN XL) 300 mg 24 hr tablet, Take 300 mg by mouth every morning, Disp: , Rfl:     hydrocortisone 2.5 % cream, Apply topically 2 (two) times a day, Disp: 3.5 g, Rfl: 0    predniSONE 10 mg tablet, 3 tablets BID x 2 days,2 tablets BID x 2 days,1 tablet BID x 2 days,1 tablet daily x 2 days., Disp: 26 tablet, Rfl: 0    QUEtiapine (SEROquel) 100 mg tablet, TAKE 1 TABLET (100 MG) BY MOUTH AT BEDTIME AS NEEDED FOR INSOMNIA (Patient not taking: Reported on 12/21/2023), Disp: , Rfl:     SUMAtriptan (Imitrex) 50 mg tablet, Take 1 tablet (50 mg total) by mouth once as needed for migraine for up to 1 dose (Patient not taking: Reported on 12/21/2023), Disp: 9 tablet, Rfl: 0    Current Allergies     Allergies as of 06/17/2024 - Reviewed 06/17/2024   Allergen Reaction Noted    Codeine Other (See Comments) 03/15/2021            The following portions of the patient's history were reviewed and updated as appropriate: allergies, current medications, past family history, past medical history, past social history, past surgical history and problem list.     Past Medical History:   Diagnosis Date    Anxiety     Chronic pain disorder     lumbar radiculopathy    Depression     GERD (gastroesophageal reflux disease)     Headache     Headache(784.0)        Past Surgical History:   Procedure Laterality Date     CERVICAL BIOPSY  W/ LOOP ELECTRODE EXCISION      HERNIA REPAIR      HERNIA REPAIR Left 09/27/2017    Procedure: LAPAROSCOPIC INGUINAL HERNIA REPAIR WITH MESH;  Surgeon: Nghia Bruno MD;  Location: [facility];  Service: General    VA ESOPHAGOGASTRODUODENOSCOPY TRANSORAL DIAGNOSTIC N/A 02/24/2017    Procedure: EGD AND COLONOSCOPY;  Surgeon: Alejandro Veloz III, MD;  Location: [facility];  Service: Gastroenterology    TUBAL LIGATION         Family History   Problem Relation Age of Onset    Depression Mother     Breast cancer Maternal Grandmother     Cancer Family          Medications have been verified.        Objective   /78   Pulse 92   Temp 97.8 °F (36.6 °C)   Resp 18   Wt 80.7 kg (178 lb)   SpO2 98%   BMI 30.55 kg/m²   No LMP recorded.       Physical Exam     Physical Exam  Constitutional:       General: She is not in acute distress.     Appearance: Normal appearance. She is not diaphoretic.   HENT:      Head: Normocephalic and atraumatic.      Mouth/Throat:      Pharynx: Oropharynx is clear. Uvula midline. No pharyngeal swelling or uvula swelling.      Comments: No tongue or lip swelling noted. Airway patent  Cardiovascular:      Rate and Rhythm: Normal rate and regular rhythm.      Heart sounds: Normal heart sounds, S1 normal and S2 normal.   Pulmonary:      Effort: Pulmonary effort is normal.      Breath sounds: Normal breath sounds and air entry.   Skin:     General: Skin is warm and dry.      Capillary Refill: Capillary refill takes less than 2 seconds.      Findings: Rash (diffiuse.) present. Rash is macular, papular and vesicular.   Neurological:      Mental Status: She is alert.

## 2024-06-17 NOTE — PATIENT INSTRUCTIONS
Take medication as directed.  Recommend applying cream to affected areas, not on face.  Can take Benadryl as needed for itching, it can make you drowsy.  Oat meal soaks can be helpful.  If you develop any increased redness, rash is spreading, facial swelling, shortness of breath, difficulty breathing, fever, any new or concerning symptoms please return or proceed ER.  Advised follow-up with PCP in 3-5 days

## 2024-07-21 DIAGNOSIS — F41.9 ANXIETY: ICD-10-CM

## 2024-07-21 DIAGNOSIS — R41.840 ATTENTION AND CONCENTRATION DEFICIT: ICD-10-CM

## 2024-07-22 RX ORDER — BUPROPION HYDROCHLORIDE 300 MG/1
300 TABLET ORAL EVERY MORNING
Qty: 90 TABLET | Refills: 0 | Status: SHIPPED | OUTPATIENT
Start: 2024-07-22

## 2024-07-22 RX ORDER — DEXTROAMPHETAMINE SACCHARATE, AMPHETAMINE ASPARTATE, DEXTROAMPHETAMINE SULFATE AND AMPHETAMINE SULFATE 7.5; 7.5; 7.5; 7.5 MG/1; MG/1; MG/1; MG/1
30 TABLET ORAL 2 TIMES DAILY
Qty: 60 TABLET | Refills: 0 | Status: SHIPPED | OUTPATIENT
Start: 2024-07-22

## 2024-07-22 RX ORDER — ALPRAZOLAM 0.5 MG/1
0.5 TABLET ORAL 2 TIMES DAILY PRN
Qty: 60 TABLET | Refills: 0 | Status: SHIPPED | OUTPATIENT
Start: 2024-07-22

## 2024-08-08 ENCOUNTER — OFFICE VISIT (OUTPATIENT)
Dept: FAMILY MEDICINE CLINIC | Facility: CLINIC | Age: 49
End: 2024-08-08
Payer: COMMERCIAL

## 2024-08-08 VITALS
HEART RATE: 70 BPM | HEIGHT: 64 IN | SYSTOLIC BLOOD PRESSURE: 120 MMHG | OXYGEN SATURATION: 97 % | WEIGHT: 170.2 LBS | DIASTOLIC BLOOD PRESSURE: 80 MMHG | BODY MASS INDEX: 29.06 KG/M2

## 2024-08-08 DIAGNOSIS — Z11.59 NEED FOR HEPATITIS C SCREENING TEST: ICD-10-CM

## 2024-08-08 DIAGNOSIS — R41.840 ATTENTION AND CONCENTRATION DEFICIT: ICD-10-CM

## 2024-08-08 DIAGNOSIS — F33.9 DEPRESSION, RECURRENT (HCC): ICD-10-CM

## 2024-08-08 DIAGNOSIS — F41.9 ANXIETY: ICD-10-CM

## 2024-08-08 DIAGNOSIS — R10.84 GENERALIZED ABDOMINAL PAIN: ICD-10-CM

## 2024-08-08 DIAGNOSIS — R35.0 URINARY FREQUENCY: ICD-10-CM

## 2024-08-08 DIAGNOSIS — Z83.3 FAMILY HISTORY OF DIABETES MELLITUS: ICD-10-CM

## 2024-08-08 DIAGNOSIS — E66.3 OVERWEIGHT: ICD-10-CM

## 2024-08-08 DIAGNOSIS — G43.109 MIGRAINE WITH AURA AND WITHOUT STATUS MIGRAINOSUS, NOT INTRACTABLE: ICD-10-CM

## 2024-08-08 DIAGNOSIS — R63.5 WEIGHT GAIN: ICD-10-CM

## 2024-08-08 DIAGNOSIS — L23.7 POISON IVY DERMATITIS: ICD-10-CM

## 2024-08-08 DIAGNOSIS — Z00.00 HEALTHCARE MAINTENANCE: ICD-10-CM

## 2024-08-08 DIAGNOSIS — Z12.31 ENCOUNTER FOR SCREENING MAMMOGRAM FOR MALIGNANT NEOPLASM OF BREAST: Primary | ICD-10-CM

## 2024-08-08 PROBLEM — E66.811 CLASS 1 OBESITY DUE TO EXCESS CALORIES WITHOUT SERIOUS COMORBIDITY WITH BODY MASS INDEX (BMI) OF 31.0 TO 31.9 IN ADULT: Status: RESOLVED | Noted: 2022-03-15 | Resolved: 2024-08-08

## 2024-08-08 PROBLEM — E66.09 CLASS 1 OBESITY DUE TO EXCESS CALORIES WITHOUT SERIOUS COMORBIDITY WITH BODY MASS INDEX (BMI) OF 31.0 TO 31.9 IN ADULT: Status: RESOLVED | Noted: 2022-03-15 | Resolved: 2024-08-08

## 2024-08-08 PROBLEM — G44.219 EPISODIC TENSION-TYPE HEADACHE, NOT INTRACTABLE: Status: RESOLVED | Noted: 2022-02-03 | Resolved: 2024-08-08

## 2024-08-08 PROCEDURE — 99214 OFFICE O/P EST MOD 30 MIN: CPT | Performed by: NURSE PRACTITIONER

## 2024-08-08 RX ORDER — METHYLPREDNISOLONE 4 MG/1
TABLET ORAL
Qty: 21 EACH | Refills: 0 | Status: SHIPPED | OUTPATIENT
Start: 2024-08-08

## 2024-08-08 RX ORDER — TRIAMCINOLONE ACETONIDE 1 MG/G
CREAM TOPICAL 2 TIMES DAILY
Qty: 15 G | Refills: 0 | Status: SHIPPED | OUTPATIENT
Start: 2024-08-08

## 2024-08-08 RX ORDER — TIRZEPATIDE 2.5 MG/.5ML
2.5 INJECTION, SOLUTION SUBCUTANEOUS WEEKLY
Qty: 2 ML | Refills: 0 | Status: SHIPPED | OUTPATIENT
Start: 2024-08-08 | End: 2024-09-05

## 2024-08-08 NOTE — ASSESSMENT & PLAN NOTE
Will obtain workup.  Will start patient on stepdown weekly.  Discussed use and side effects.  Discussed dietary changes and exercise.

## 2024-08-08 NOTE — ASSESSMENT & PLAN NOTE
Advised to stop Wellbutrin as she is only taking it as needed.  Patient states she has been on every medication and has failed every medication.

## 2024-08-08 NOTE — PROGRESS NOTES
Ambulatory Visit  Name: Ana M Sanchez      : 1975      MRN: [id number]  Encounter Provider: VAMSHI Patel  Encounter Date: 2024   Encounter department: [facility] [address] [place] [address]    Assessment & Plan   1. Encounter for screening mammogram for malignant neoplasm of breast  -     Mammo screening bilateral w 3d & cad; Future  2. Weight gain  Assessment & Plan:  Will obtain workup.  Will start patient on stepdown weekly.  Discussed use and side effects.  Discussed dietary changes and exercise.  Orders:  -     Cortisol; Future  -     Testosterone, free, total; Future  -     FSH and LH; Future  -     tirzepatide (Zepbound) 2.5 mg/0.5 mL auto-injector; Inject 0.5 mL (2.5 mg total) under the skin once a week for 28 days  3. Healthcare maintenance  -     CBC and differential; Future  -     Comprehensive metabolic panel; Future  -     Lipid panel; Future  -     TSH, 3rd generation with Free T4 reflex; Future  4. Overweight  -     tirzepatide (Zepbound) 2.5 mg/0.5 mL auto-injector; Inject 0.5 mL (2.5 mg total) under the skin once a week for 28 days  5. Poison ivy dermatitis  -     methylPREDNISolone 4 MG tablet therapy pack; Use as directed on package  -     triamcinolone (KENALOG) 0.1 % cream; Apply topically 2 (two) times a day  6. Need for hepatitis C screening test  -     Hepatitis C antibody; Future  7. Migraine with aura and without status migrainosus, not intractable  -     Ambulatory Referral to Neurology; Future  8. Generalized abdominal pain  -     US abdomen complete; Future; Expected date: 2024  9. Family history of diabetes mellitus  -     Hemoglobin A1C; Future  10. Urinary frequency  -     UA w Reflex to Microscopic w Reflex to Culture; Future  11. Anxiety  Assessment & Plan:  Okay to continue Xanax sparingly as needed.  12. Depression, recurrent (HCC)  Assessment & Plan:  Advised to stop Wellbutrin as she is only taking it as needed.  Patient states she  has been on every medication and has failed every medication.  13. Attention and concentration deficit  Assessment & Plan:  Continue on Adderall twice daily.      Depression Screening and Follow-up Plan: Patient was screened for depression during today's encounter. They screened negative with a PHQ-9 score of 0.      History of Present Illness     Patient presents for routine follow up. She has been having stomach issues. Moreso when lifting heavy things. Abd pain around umbilical area.         Review of Systems   Constitutional:  Positive for fatigue. Negative for chills, diaphoresis and fever.   HENT:  Negative for ear pain and sore throat.    Eyes:  Negative for pain and visual disturbance.   Respiratory:  Negative for cough and shortness of breath.    Cardiovascular:  Negative for chest pain and palpitations.   Gastrointestinal:  Positive for abdominal pain and constipation. Negative for diarrhea, nausea and vomiting.   Genitourinary:  Positive for frequency. Negative for dysuria and hematuria.   Musculoskeletal:  Negative for arthralgias and back pain.   Neurological:  Positive for headaches. Negative for dizziness, seizures, syncope and light-headedness.   Psychiatric/Behavioral:  Positive for dysphoric mood and sleep disturbance. Negative for suicidal ideas. The patient is nervous/anxious.    All other systems reviewed and are negative.    Current Outpatient Medications on File Prior to Visit   Medication Sig Dispense Refill    ALPRAZolam (XANAX) 0.5 mg tablet Take 1 tablet (0.5 mg total) by mouth 2 (two) times a day as needed for anxiety 60 tablet 0    amitriptyline (ELAVIL) 75 mg tablet TAKE 1 TABLET (75 MG TOTAL) BY MOUTH DAILY AT BEDTIME 90 tablet 1    amphetamine-dextroamphetamine (ADDERALL, 30MG,) 30 MG tablet Take 1 tablet (30 mg total) by mouth 2 (two) times a day Max Daily Amount: 60 mg 60 tablet 0    SUMAtriptan (Imitrex) 50 mg tablet Take 1 tablet (50 mg total) by mouth once as needed for migraine  "for up to 1 dose 9 tablet 0    [DISCONTINUED] buPROPion (WELLBUTRIN XL) 300 mg 24 hr tablet Take 1 tablet (300 mg total) by mouth every morning (Patient not taking: Reported on 8/8/2024) 90 tablet 0    [DISCONTINUED] hydrocortisone 2.5 % cream Apply topically 2 (two) times a day 3.5 g 0    [DISCONTINUED] predniSONE 10 mg tablet 3 tablets BID x 2 days,2 tablets BID x 2 days,1 tablet BID x 2 days,1 tablet daily x 2 days. (Patient not taking: Reported on 8/8/2024) 26 tablet 0    [DISCONTINUED] QUEtiapine (SEROquel) 100 mg tablet TAKE 1 TABLET (100 MG) BY MOUTH AT BEDTIME AS NEEDED FOR INSOMNIA (Patient not taking: Reported on 12/21/2023)       No current facility-administered medications on file prior to visit.      Objective     /80   Pulse 70   Ht 5' 4\" (1.626 m)   Wt 77.2 kg (170 lb 3.2 oz)   SpO2 97%   BMI 29.21 kg/m²     Physical Exam  Vitals and nursing note reviewed.   Constitutional:       General: She is not in acute distress.     Appearance: She is well-developed.   HENT:      Head: Normocephalic and atraumatic.   Eyes:      Conjunctiva/sclera: Conjunctivae normal.   Cardiovascular:      Rate and Rhythm: Normal rate and regular rhythm.      Heart sounds: No murmur heard.  Pulmonary:      Effort: Pulmonary effort is normal. No respiratory distress.      Breath sounds: Normal breath sounds.   Abdominal:      Palpations: Abdomen is soft.      Tenderness: There is no abdominal tenderness.      Hernia: A hernia is present. Hernia is present in the umbilical area.   Musculoskeletal:         General: No swelling.      Cervical back: Neck supple.   Skin:     General: Skin is warm and dry.      Capillary Refill: Capillary refill takes less than 2 seconds.   Neurological:      Mental Status: She is alert and oriented to person, place, and time.   Psychiatric:         Mood and Affect: Mood normal.       Administrative Statements   I have spent a total time of 20 minutes in caring for this patient on the day " of the visit/encounter including Counseling / Coordination of care, Documenting in the medical record, Reviewing / ordering tests, medicine, procedures  , and Obtaining or reviewing history  .

## 2024-08-09 ENCOUNTER — TELEPHONE (OUTPATIENT)
Age: 49
End: 2024-08-09

## 2024-08-09 DIAGNOSIS — E66.3 OVERWEIGHT: ICD-10-CM

## 2024-08-09 DIAGNOSIS — R63.5 WEIGHT GAIN: ICD-10-CM

## 2024-08-09 NOTE — TELEPHONE ENCOUNTER
Reason for call:   [] Prior Auth  [] Other:     Caller:  [] Patient  [] Pharmacy  Name:   Address:   Callback Number:     Medication: zepbound    Dose/Frequency: 2.5mg        Ordering Provider:   [x] PCP/Provider - AMAURY Kong  [] Speciality/Provider -     Has the patient tried other medications and failed? If failed, which medications did they fail?    [] No   [x] Yes - amphetamine dextroamphetamine    Is the patient's insurance updated in EPIC?   [x] Yes Both  [] No     Is a copy of the patient's insurance scanned in EPIC?   [x] Yes Both  [] No

## 2024-08-09 NOTE — TELEPHONE ENCOUNTER
Patient called in asking for Yasmeen in the past she helped with the prior auth on the medication.  Previously it has been when request sent in a prior auth was done and if denied it was then sent to secondary insurance that covered it.      Pep explained to patient that this procedure is now done at a centralized location and the request has been started.  (The process was confirmed by co-worker in process).  Pep advised patient of this information and she stated she understood.  Pep also advised that there is a bit of a back log on the prior auths so it may take longer than usual to get started and after it can take time but request started.  Patient understood.

## 2024-08-13 NOTE — TELEPHONE ENCOUNTER
Patient needs prior authorization for zepbound 2.5mg. patient stated she needs a letter stating that her primary insurance will not cover her medication to her secondary insurance

## 2024-08-17 LAB
ALBUMIN SERPL-MCNC: 4.3 G/DL (ref 3.6–5.1)
ALBUMIN/GLOB SERPL: 1.7 (CALC) (ref 1–2.5)
ALP SERPL-CCNC: 65 U/L (ref 31–125)
ALT SERPL-CCNC: 11 U/L (ref 6–29)
AST SERPL-CCNC: 12 U/L (ref 10–35)
BASOPHILS # BLD AUTO: 37 CELLS/UL (ref 0–200)
BASOPHILS NFR BLD AUTO: 0.6 %
BILIRUB SERPL-MCNC: 0.4 MG/DL (ref 0.2–1.2)
BUN SERPL-MCNC: 14 MG/DL (ref 7–25)
BUN/CREAT SERPL: NORMAL (CALC) (ref 6–22)
CALCIUM SERPL-MCNC: 9.2 MG/DL (ref 8.6–10.2)
CHLORIDE SERPL-SCNC: 105 MMOL/L (ref 98–110)
CHOLEST SERPL-MCNC: 210 MG/DL
CHOLEST/HDLC SERPL: 3.1 (CALC)
CO2 SERPL-SCNC: 29 MMOL/L (ref 20–32)
CORTIS SERPL-MCNC: 3.4 MCG/DL
CREAT SERPL-MCNC: 0.88 MG/DL (ref 0.5–0.99)
EOSINOPHIL # BLD AUTO: 12 CELLS/UL (ref 15–500)
EOSINOPHIL NFR BLD AUTO: 0.2 %
ERYTHROCYTE [DISTWIDTH] IN BLOOD BY AUTOMATED COUNT: 12.4 % (ref 11–15)
FSH SERPL-ACNC: 48.2 MIU/ML
GFR/BSA.PRED SERPLBLD CYS-BASED-ARV: 82 ML/MIN/1.73M2
GLOBULIN SER CALC-MCNC: 2.6 G/DL (CALC) (ref 1.9–3.7)
GLUCOSE SERPL-MCNC: 99 MG/DL (ref 65–99)
HCT VFR BLD AUTO: 43.5 % (ref 35–45)
HCV AB SERPL QL IA: NORMAL
HDLC SERPL-MCNC: 68 MG/DL
HGB BLD-MCNC: 14.6 G/DL (ref 11.7–15.5)
LDLC SERPL CALC-MCNC: 127 MG/DL (CALC)
LH SERPL-ACNC: 22.2 MIU/ML
LYMPHOCYTES # BLD AUTO: 911 CELLS/UL (ref 850–3900)
LYMPHOCYTES NFR BLD AUTO: 14.7 %
MCH RBC QN AUTO: 30.6 PG (ref 27–33)
MCHC RBC AUTO-ENTMCNC: 33.6 G/DL (ref 32–36)
MCV RBC AUTO: 91.2 FL (ref 80–100)
MONOCYTES # BLD AUTO: 167 CELLS/UL (ref 200–950)
MONOCYTES NFR BLD AUTO: 2.7 %
NEUTROPHILS # BLD AUTO: 5072 CELLS/UL (ref 1500–7800)
NEUTROPHILS NFR BLD AUTO: 81.8 %
NONHDLC SERPL-MCNC: 142 MG/DL (CALC)
PLATELET # BLD AUTO: 405 THOUSAND/UL (ref 140–400)
PMV BLD REES-ECKER: 10.8 FL (ref 7.5–12.5)
POTASSIUM SERPL-SCNC: 3.9 MMOL/L (ref 3.5–5.3)
PROT SERPL-MCNC: 6.9 G/DL (ref 6.1–8.1)
RBC # BLD AUTO: 4.77 MILLION/UL (ref 3.8–5.1)
SODIUM SERPL-SCNC: 142 MMOL/L (ref 135–146)
TESTOST SERPL-MCNC: 1.3 PG/ML (ref 0.2–5)
TESTOST SERPL-MCNC: 13 NG/DL (ref 2–45)
TRIGL SERPL-MCNC: 60 MG/DL
TSH SERPL-ACNC: 0.7 MIU/L
WBC # BLD AUTO: 6.2 THOUSAND/UL (ref 3.8–10.8)

## 2024-08-19 ENCOUNTER — TELEPHONE (OUTPATIENT)
Age: 49
End: 2024-08-19

## 2024-08-19 NOTE — TELEPHONE ENCOUNTER
PA for tirzepatide (Zepbound) 2.5 mg/0.5 mL auto-injector SUBMITTED     via    [x][facility]-KEY: [id number]  []Surescripts-Case ID #   []Faxed to plan   []Other website   []Phone call Case ID #     Office notes sent, clinical questions answered. Awaiting determination    Turnaround time for your insurance to make a decision on your Prior Authorization can take 7-21 business days.

## 2024-08-19 NOTE — TELEPHONE ENCOUNTER
Patient wants to know if [facility] was [facility]  or call was made to advise med was denied by 1st insurance. She was advise auth dept will be doing thins. Please advise patient

## 2024-08-19 NOTE — TELEPHONE ENCOUNTER
PA for tirzepatide (Zepbound) All Strengths Approved     Date(s) approved 8/19/24 to 2/19/25    Case # [id number]     Patient advised by          [x] MyChart Message  [] Phone call   []LMOM  []L/M to call office as no active Communication consent on file  []Unable to leave detailed message as VM not approved on Communication consent       Pharmacy advised by    [x]Fax  []Phone call    Approval letter scanned into Media Yes

## 2024-08-20 ENCOUNTER — TELEPHONE (OUTPATIENT)
Age: 49
End: 2024-08-20

## 2024-08-20 DIAGNOSIS — R10.84 GENERALIZED ABDOMINAL PAIN: Primary | ICD-10-CM

## 2024-08-20 NOTE — TELEPHONE ENCOUNTER
Tried calling patient. Medication approved and [facility]. Please see media.

## 2024-08-20 NOTE — TELEPHONE ENCOUNTER
Patient called again today and wants to know if letter was sent to [facility]  or call was made to advise medication was denied by [facility] insurance. She was advised auth dept will be doing thins. Please advise patient     She also wants Trupti to know that the US of the abdomen that she had done was not in the area of the pain she's having.  Her pain is coming from her belly button area.

## 2024-08-20 NOTE — TELEPHONE ENCOUNTER
Called patient she was told by the Dweho that this test doesn't focus on the area that hurst, she his having pain from her belly button to lower stomach and she is not eating now because she feels nauseous. Her results are back but in care everywhere because it was done at [facility] please view

## 2024-08-20 NOTE — TELEPHONE ENCOUNTER
Spoke with patient. She would like to have the US result explained in layman's terms. She is concerned that she needs to possibly see a hernia specialist? Are we able to provide a little more detail for her that she can understand?  Please advise, thank you!

## 2024-08-20 NOTE — TELEPHONE ENCOUNTER
Patient called back, pt was talking to Mckayla from [facility] who lost connection with patient. No note documented.    Patients medication Zepbound has been approved by [employer] her secondary insurance. Her primary insurance didn't cover medication.     If the medication was kicked back by her primary insurance and is not paying office will have to resubmit the medication for Zepbound.     Transferred to office, office took call.

## 2024-08-21 NOTE — TELEPHONE ENCOUNTER
Spoke with the patient, I told her that the PA for Zepbound is approved. I called the pharmacy they said it will be ready tomorrow. Patient is aware

## 2024-08-21 NOTE — TELEPHONE ENCOUNTER
Called patient to make her aware her US was normal but since she is still  having the pain and no better Trupti would for her to have ct scan done. Lmov for patient to call back

## 2024-08-21 NOTE — TELEPHONE ENCOUNTER
Pt called back. Explained Trupti would like for her to get the CT done. Pt amol. Pt also asked about status of Zepbound. Told her it seems like it was sent to PA dept and we are just waiting.

## 2024-08-22 DIAGNOSIS — R63.5 WEIGHT GAIN: ICD-10-CM

## 2024-08-22 DIAGNOSIS — E66.3 OVERWEIGHT: ICD-10-CM

## 2024-08-22 RX ORDER — TIRZEPATIDE 2.5 MG/.5ML
2.5 INJECTION, SOLUTION SUBCUTANEOUS WEEKLY
Qty: 2 ML | Refills: 0 | Status: SHIPPED | OUTPATIENT
Start: 2024-08-22 | End: 2024-09-19

## 2024-08-22 NOTE — TELEPHONE ENCOUNTER
Patient called to let office know that zepbound is in stock at the [facility] and asked that it please be sent there to have it filled.

## 2024-09-03 DIAGNOSIS — R63.5 WEIGHT GAIN: ICD-10-CM

## 2024-09-03 DIAGNOSIS — E66.3 OVERWEIGHT: ICD-10-CM

## 2024-09-03 RX ORDER — TIRZEPATIDE 2.5 MG/.5ML
2.5 INJECTION, SOLUTION SUBCUTANEOUS WEEKLY
Qty: 2 ML | Refills: 0 | OUTPATIENT
Start: 2024-09-03 | End: 2024-10-01

## 2024-09-03 NOTE — TELEPHONE ENCOUNTER
Reason for call:   [x] Refill   [] Prior Auth  [x] Other: Patient states she has one pen left and is unsure if the provider will increase her dose.    Office:   [x] PCP/Provider - TRAE DIAZ [address] - VAMSHI Patel   [] Specialty/Provider -     Medication: tirzepatide (Zepbound) 2.5 mg/0.5 mL auto-injector     Dose/Frequency: Inject 0.5 mL (2.5 mg total) under the skin once a week for 28 days     Quantity: 2 mL     Pharmacy: [facility] - [place], [place] - [address] [place] [address] [phone]    Does the patient have enough for 3 days?   [] Yes   [x] No - Send as HP to POD

## 2024-09-04 DIAGNOSIS — E66.3 OVERWEIGHT: ICD-10-CM

## 2024-09-04 DIAGNOSIS — R63.5 WEIGHT GAIN: Primary | ICD-10-CM

## 2024-09-04 DIAGNOSIS — Z86.19 HX OF COLD SORES: Primary | ICD-10-CM

## 2024-09-04 RX ORDER — TIRZEPATIDE 5 MG/.5ML
5 INJECTION, SOLUTION SUBCUTANEOUS WEEKLY
Qty: 2 ML | Refills: 0 | Status: SHIPPED | OUTPATIENT
Start: 2024-09-04

## 2024-09-04 RX ORDER — VALACYCLOVIR HYDROCHLORIDE 1 G/1
2000 TABLET, FILM COATED ORAL 2 TIMES DAILY
Qty: 4 TABLET | Refills: 0 | Status: SHIPPED | OUTPATIENT
Start: 2024-09-04 | End: 2024-09-05

## 2024-09-06 DIAGNOSIS — R41.840 ATTENTION AND CONCENTRATION DEFICIT: ICD-10-CM

## 2024-09-09 RX ORDER — DEXTROAMPHETAMINE SACCHARATE, AMPHETAMINE ASPARTATE, DEXTROAMPHETAMINE SULFATE AND AMPHETAMINE SULFATE 7.5; 7.5; 7.5; 7.5 MG/1; MG/1; MG/1; MG/1
30 TABLET ORAL 2 TIMES DAILY
Qty: 60 TABLET | Refills: 0 | Status: SHIPPED | OUTPATIENT
Start: 2024-09-09

## 2024-09-10 DIAGNOSIS — F41.9 ANXIETY: ICD-10-CM

## 2024-09-10 RX ORDER — ALPRAZOLAM 0.5 MG
0.5 TABLET ORAL 2 TIMES DAILY PRN
Qty: 60 TABLET | Refills: 0 | Status: SHIPPED | OUTPATIENT
Start: 2024-09-10 | End: 2024-09-13 | Stop reason: SDUPTHER

## 2024-09-13 DIAGNOSIS — F41.9 ANXIETY: ICD-10-CM

## 2024-09-13 RX ORDER — ALPRAZOLAM 1 MG
1 TABLET ORAL 2 TIMES DAILY PRN
Qty: 60 TABLET | Refills: 0 | Status: SHIPPED | OUTPATIENT
Start: 2024-09-13

## 2024-09-24 ENCOUNTER — OFFICE VISIT (OUTPATIENT)
Dept: SURGERY | Facility: CLINIC | Age: 49
End: 2024-09-24
Payer: COMMERCIAL

## 2024-09-24 VITALS
HEIGHT: 64 IN | HEART RATE: 84 BPM | WEIGHT: 155.4 LBS | DIASTOLIC BLOOD PRESSURE: 72 MMHG | SYSTOLIC BLOOD PRESSURE: 124 MMHG | RESPIRATION RATE: 18 BRPM | OXYGEN SATURATION: 98 % | BODY MASS INDEX: 26.53 KG/M2 | TEMPERATURE: 98.1 F

## 2024-09-24 DIAGNOSIS — R10.33 PERIUMBILICAL ABDOMINAL PAIN: ICD-10-CM

## 2024-09-24 DIAGNOSIS — R11.2 NAUSEA AND VOMITING, UNSPECIFIED VOMITING TYPE: Primary | ICD-10-CM

## 2024-09-24 PROCEDURE — 99244 OFF/OP CNSLTJ NEW/EST MOD 40: CPT | Performed by: SURGERY

## 2024-09-24 NOTE — PROGRESS NOTES
Consult- General Surgery   Ana M Sanchez 49 y.o. female MRN: [id number]  Unit/Bed#:  Encounter: [id number]    Assessment & Plan     Assessment:  Abdominal pain with nausea and vomiting  Recurrent right inguinal hernia  History of anxiety, stable  History of depression, stable  History of GERD, stable  History of migraine headaches, stable    Plan:  There is an obvious right inguinal hernia, the abdomen is not distended but diffusely tender without guarding or rebound.  I will order a CT scan of the abdominal pelvis p.o. and IV contrast stat.  Further recommendations will be made pending on the results.    History of Present Illness     HPI:  Ana M Sanchez is a 49 y.o. female who presents to my office for evaluation of abdominal pain with nausea and vomiting that is been going on for the past 3 months.  Patient stated that the symptoms started 3 months ago on and off but has become more constant in the past 6 weeks.  She stated that she has been limiting her oral intake due to the pain, she stated that the pain does not improve or makes it worse, but she does have vomiting which is present 2-3 times a week.  She stated the pain is a mid abdomen and lately has radiated to the right side and into the right groin.  She went to see her primary care physician and referred to us for surgical evaluation.  No image study has been done at this point.  Abdominal ultrasound from August 20, 2024 revealed no abnormalities.  Patient lost 25 pounds in the last 3 months, patient rated the pain 9 out of 10    Review of Systems  The rest of the review of system total of 10 were negative except for the HPI.    Historical Information   Past Medical History:   Diagnosis Date    Anxiety     Chronic pain disorder     lumbar radiculopathy    Depression     GERD (gastroesophageal reflux disease)     Headache     Headache(784.0)      Past Surgical History:   Procedure Laterality Date    CARPAL TUNNEL RELEASE Bilateral     CERVICAL BIOPSY   W/ LOOP ELECTRODE EXCISION      HERNIA REPAIR Right     inguinal hernia    HERNIA REPAIR Left 09/27/2017    Procedure: LAPAROSCOPIC INGUINAL HERNIA REPAIR WITH MESH;  Surgeon: Nghia Bruno MD;  Location: [facility];  Service: General    WI ESOPHAGOGASTRODUODENOSCOPY TRANSORAL DIAGNOSTIC N/A 02/24/2017    Procedure: EGD AND COLONOSCOPY;  Surgeon: Alejandro Veloz III, MD;  Location: [facility];  Service: Gastroenterology    TUBAL LIGATION      UMBILICAL HERNIA REPAIR      WISDOM TOOTH EXTRACTION       Social History   Social History     Substance and Sexual Activity   Alcohol Use No     Social History     Substance and Sexual Activity   Drug Use No     Social History     Tobacco Use   Smoking Status Never    Passive exposure: Never   Smokeless Tobacco Never     Family History: Family history non-contributory    Meds/Allergies   all medications and allergies reviewed     Current Outpatient Medications:     ALPRAZolam (XANAX) 1 mg tablet, Take 1 tablet (1 mg total) by mouth 2 (two) times a day as needed for anxiety, Disp: 60 tablet, Rfl: 0    amitriptyline (ELAVIL) 75 mg tablet, TAKE 1 TABLET (75 MG TOTAL) BY MOUTH DAILY AT BEDTIME, Disp: 90 tablet, Rfl: 1    amphetamine-dextroamphetamine (ADDERALL, 30MG,) 30 MG tablet, Take 1 tablet (30 mg total) by mouth 2 (two) times a day Max Daily Amount: 60 mg, Disp: 60 tablet, Rfl: 0    SUMAtriptan (Imitrex) 50 mg tablet, Take 1 tablet (50 mg total) by mouth once as needed for migraine for up to 1 dose, Disp: 9 tablet, Rfl: 0    tirzepatide (Zepbound) 5 mg/0.5 mL auto-injector, Inject 0.5 mL (5 mg total) under the skin once a week, Disp: 2 mL, Rfl: 0    valACYclovir (VALTREX) 1,000 mg tablet, Take 2 tablets (2,000 mg total) by mouth 2 (two) times a day for 1 day, Disp: 4 tablet, Rfl: 0  Allergies   Allergen Reactions    Codeine Other (See Comments)       Objective     Current Vitals:   Blood Pressure: 124/72 (09/24/24 1502)  Pulse: 84 (09/24/24 1502)  Temperature: 98.1 °F  "(36.7 °C) (09/24/24 1502)  Respirations: 18 (09/24/24 1502)  Height: 5' 4\" (162.6 cm) (09/24/24 1502)  Weight - Scale: 70.5 kg (155 lb 6.4 oz) (09/24/24 1502)  SpO2: 98 % (09/24/24 1502)    Physical Exam  Vitals and nursing note reviewed.   Constitutional:       General: She is not in acute distress.  Cardiovascular:      Rate and Rhythm: Normal rate and regular rhythm.   Pulmonary:      Effort: No respiratory distress.      Breath sounds: Normal breath sounds.   Abdominal:      Comments: Abdomen is nondistended, diffusely tender with voluntary guarding, no mass palpable.  There is an obvious recurrent right inguinal hernia, somewhat tender to palpation.  There is no visceromegaly.   Skin:     General: Skin is warm.      Coloration: Skin is not jaundiced.      Findings: No erythema or rash.   Neurological:      Mental Status: She is alert and oriented to person, place, and time.      Cranial Nerves: No cranial nerve deficit.   Psychiatric:         Mood and Affect: Mood normal.         Behavior: Behavior normal.       Imaging: Reviewed radiology reports from this admission including: Ultrasound(s).  Procedure: US ABDOMEN COMPLETE    Result Date: 8/20/2024  Narrative: EXAM: US ABDOMEN COMPLETE TECHNIQUE: Grayscale and color Doppler still images and cine clips and duplex imaging was obtained of the abdominal viscera. COMPARISON: None available.   HISTORY: Generalized abdominal pain FINDINGS: Pancreas: Imaged portions appear normal.   Liver: Normal size, surface contour and echotexture. No masses.  Hepatopetal flow in the main portal vein.   Biliary: No gallstones, pericholecystic fluid or wall thickening.  Sonographic San's sign was negative.  Common bile duct measures 2 mm.   Right kidney: 10.7 cm in length.  No masses, calculi, or hydronephrosis.   Parenchymal echogenicity appears normal.   Left kidney: 10.7 cm in length.  No masses, calculi, or hydronephrosis.   Parenchymal echogenicity appears normal.   Spleen: " Normal size, measuring up to 9.5 cm.   Aorta: Normal caliber.   IVC: Visualized portions appear patent.      Impression: IMPRESSION: No sonographic abnormalities within the abdomen. Workstation:[id number]

## 2024-09-25 ENCOUNTER — TELEPHONE (OUTPATIENT)
Dept: SURGERY | Facility: CLINIC | Age: 49
End: 2024-09-25

## 2024-09-25 ENCOUNTER — HOSPITAL ENCOUNTER (OUTPATIENT)
Dept: CT IMAGING | Facility: HOSPITAL | Age: 49
Discharge: HOME | End: 2024-09-25
Attending: SURGERY
Payer: COMMERCIAL

## 2024-09-25 DIAGNOSIS — R11.2 NAUSEA AND VOMITING, UNSPECIFIED VOMITING TYPE: ICD-10-CM

## 2024-09-25 PROCEDURE — 74177 CT ABD & PELVIS W/CONTRAST: CPT

## 2024-09-25 RX ADMIN — IOHEXOL 100 ML: 350 INJECTION, SOLUTION INTRAVENOUS at 11:30

## 2024-09-25 NOTE — TELEPHONE ENCOUNTER
Pt called regarding msg above. Pt stated received a msg saying the CT scan came clear. However pt is still having abdominal pain. Pt would like to receive a call from the practice.

## 2024-09-30 DIAGNOSIS — R63.5 WEIGHT GAIN: ICD-10-CM

## 2024-09-30 DIAGNOSIS — E66.3 OVERWEIGHT: ICD-10-CM

## 2024-10-01 DIAGNOSIS — Z86.19 HX OF COLD SORES: ICD-10-CM

## 2024-10-01 RX ORDER — VALACYCLOVIR HYDROCHLORIDE 500 MG/1
500 TABLET, FILM COATED ORAL 2 TIMES DAILY
Qty: 20 TABLET | Refills: 0 | Status: SHIPPED | OUTPATIENT
Start: 2024-10-01 | End: 2024-10-11

## 2024-10-01 RX ORDER — TIRZEPATIDE 5 MG/.5ML
5 INJECTION, SOLUTION SUBCUTANEOUS WEEKLY
Qty: 2 ML | Refills: 0 | Status: SHIPPED | OUTPATIENT
Start: 2024-10-01 | End: 2024-10-04 | Stop reason: SDUPTHER

## 2024-10-01 NOTE — PROGRESS NOTES
Assessment/Plan:  Pelvic US ordered  Referred to GI.   UA CS ordered.Encouraged to complete previously ordered BUN and creatinine.   Referred to pelvic floor PT.   Any worsening of symptoms, consider follow up in ER.  Treatment for menstrual cramps/bleeding- Ibuprofen 600 mg by mouth with onset of bleeding or cramping, whichever is first. Take second dose of ibuprofen  400 mg by mouth with food and repeat every 6 hours x 3 days.  Could consider progesterone only contraceptive for irregular menses.   Estrogen not recommended due to risk of stroke with hx of migraine with aura.   Return to office for annual exam.        1. Irregular menses  -     US pelvis complete w transvaginal; Future  2. Pelvic pain  -     US pelvis complete w transvaginal; Future  3. ANNALISE (stress urinary incontinence, female)  -     Urinalysis with microscopic; Future  -     Urine culture; Future  -     Ambulatory referral to Physical Therapy; Future  4. Nausea and vomiting, unspecified vomiting type  -     US pelvis complete w transvaginal; Future  -     Ambulatory referral to Gastroenterology; Future  5. Right lower quadrant abdominal pain  -     Ambulatory referral to Gastroenterology; Future             Subjective:      Patient ID: Ana M Sanchez is a 49 y.o. female.    HPI    Ana M Sanchez is a 49 y.o.  female who is here today as a new patient  for a problem visit.  Admits to feeling unwell and constant abdominal and pelvic pain x 5 months. Points to right sided mid and lower pain.   Heavy lifting worsens the pain. No alleviating factors. 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   Pain  rating now 10/10. No medication taken as she feels all OTC is ineffective.   No fever. Does admit to nausea and intermittent vomiting x 3 months accompanied by a weight loss of 16 lbs over that last 2 months.     Admits to irregular menses x 1 year-often 2 menses per month Bleeding 3-4 days  typically but can bleed up to 3  "weeks per month with varied flow.   8/13/24 Normal TSH,CMP and CBC.   8/19/24 abdominal US was normal.    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     Ana M Sanchez is not sexually active with male partner/fiancee of 14 years. Denies pain, bleeding or dryness.  She is  monogamous. She has not had sex since February.   She is not interested in STD screening today.   She denies vaginal discharge, itching, pelvic pain.   She has no urinary concerns, does  have ANNALISE for one year.  No bowel concerns.  No breast concerns.     Hx of migraines with aura.     The following portions of the patient's history were reviewed and updated as appropriate: allergies, current medications, past family history, past medical history, past social history, past surgical history, and problem list.    Review of Systems   Constitutional: Negative.    Respiratory:  Negative for chest tightness.    Cardiovascular:  Negative for chest pain.   Gastrointestinal:  Positive for abdominal pain, nausea and vomiting. Negative for abdominal distention, blood in stool, constipation and diarrhea.   Genitourinary:  Positive for menstrual problem, pelvic pain and vaginal discharge. Negative for decreased urine volume, difficulty urinating, dysuria, frequency, genital sores, hematuria, urgency, vaginal bleeding and vaginal pain.        ANNALISE   Musculoskeletal:  Positive for back pain. Negative for arthralgias and myalgias.   Skin: Negative.    Hematological:  Negative for adenopathy.   Psychiatric/Behavioral: Negative.     All other systems reviewed and are negative.        Objective:      /86 (BP Location: Left arm, Patient Position: Sitting, Cuff Size: Standard)   Ht 5' 2.75\" (1.594 m)   Wt 69.4 kg (153 lb)   LMP 09/26/2024 (Approximate)   BMI 27.32 kg/m²          Physical Exam  Vitals and nursing note reviewed. "   Constitutional:       Appearance: Normal appearance. She is well-developed.   Genitourinary:     General: Normal vulva.      Exam position: Lithotomy position.      Labia:         Right: No rash, tenderness, lesion or injury.         Left: No rash, tenderness, lesion or injury.       Urethra: No prolapse, urethral pain, urethral swelling or urethral lesion.      Vagina: Normal.      Cervix: Normal.      Uterus: Normal.       Adnexa: Right adnexa normal and left adnexa normal.        Right: No mass, tenderness or fullness.          Left: No mass, tenderness or fullness.        Rectum: No external hemorrhoid.   Lymphadenopathy:      Lower Body: No right inguinal adenopathy. No left inguinal adenopathy.   Skin:     General: Skin is warm and dry.   Neurological:      Mental Status: She is alert and oriented to person, place, and time.   Psychiatric:         Mood and Affect: Mood normal.         Behavior: Behavior normal.

## 2024-10-02 NOTE — TELEPHONE ENCOUNTER
Pt called, her Zepbound script needs to go to the  [facility] in [place]. [facility] does not have it in stock. Can we please make this change. Please advise.

## 2024-10-03 ENCOUNTER — OFFICE VISIT (OUTPATIENT)
Dept: OBGYN CLINIC | Facility: MEDICAL CENTER | Age: 49
End: 2024-10-03
Payer: COMMERCIAL

## 2024-10-03 VITALS
DIASTOLIC BLOOD PRESSURE: 86 MMHG | HEIGHT: 63 IN | SYSTOLIC BLOOD PRESSURE: 118 MMHG | BODY MASS INDEX: 27.11 KG/M2 | WEIGHT: 153 LBS

## 2024-10-03 DIAGNOSIS — R10.31 RIGHT LOWER QUADRANT ABDOMINAL PAIN: ICD-10-CM

## 2024-10-03 DIAGNOSIS — R11.2 NAUSEA AND VOMITING, UNSPECIFIED VOMITING TYPE: ICD-10-CM

## 2024-10-03 DIAGNOSIS — R10.2 PELVIC PAIN: ICD-10-CM

## 2024-10-03 DIAGNOSIS — N39.3 SUI (STRESS URINARY INCONTINENCE, FEMALE): ICD-10-CM

## 2024-10-03 DIAGNOSIS — N92.6 IRREGULAR MENSES: Primary | ICD-10-CM

## 2024-10-03 PROCEDURE — 99203 OFFICE O/P NEW LOW 30 MIN: CPT | Performed by: NURSE PRACTITIONER

## 2024-10-03 NOTE — PATIENT INSTRUCTIONS
Pelvic US ordered  Referred to GI.   UA CS ordered.Encouraged to complete previously ordered BUN and creatinine.   Referred to pelvic floor PT.   Any worsening of symptoms, consider follow up in ER.  Treatment for menstrual cramps/bleeding- Ibuprofen 600 mg by mouth with onset of bleeding or cramping, whichever is first. Take second dose of ibuprofen  400 mg by mouth with food and repeat every 6 hours x 3 days.  Could consider progesterone only contraceptive for irregular menses.   Estrogen not recommended due to risk of stroke with hx of migraine with aura.   Return to office for annual exam.

## 2024-10-04 ENCOUNTER — TELEPHONE (OUTPATIENT)
Age: 49
End: 2024-10-04

## 2024-10-04 DIAGNOSIS — R63.5 WEIGHT GAIN: ICD-10-CM

## 2024-10-04 DIAGNOSIS — E66.3 OVERWEIGHT: ICD-10-CM

## 2024-10-04 RX ORDER — TIRZEPATIDE 5 MG/.5ML
5 INJECTION, SOLUTION SUBCUTANEOUS WEEKLY
Qty: 2 ML | Refills: 0 | Status: SHIPPED | OUTPATIENT
Start: 2024-10-04 | End: 2024-10-10

## 2024-10-04 NOTE — TELEPHONE ENCOUNTER
Pt called stating the Zepbound went to [facility] in error and needs to go to [facility]. Asking for a new script to be sent

## 2024-10-10 ENCOUNTER — TELEPHONE (OUTPATIENT)
Age: 49
End: 2024-10-10

## 2024-10-10 DIAGNOSIS — R63.5 WEIGHT GAIN: Primary | ICD-10-CM

## 2024-10-10 RX ORDER — TIRZEPATIDE 7.5 MG/.5ML
7.5 INJECTION, SOLUTION SUBCUTANEOUS WEEKLY
Qty: 2 ML | Refills: 0 | Status: SHIPPED | OUTPATIENT
Start: 2024-10-10

## 2024-10-10 NOTE — TELEPHONE ENCOUNTER
Pt stated she  refill from pharmacy Ozempic is at the same dose pt stated as discuss if she is doing well in one month dose is to get increase pt requesting for her next refill to increase dose and send script to [facility] pharmacy in [place].please advise. [phone]

## 2024-10-30 DIAGNOSIS — F41.9 ANXIETY: ICD-10-CM

## 2024-10-30 DIAGNOSIS — R41.840 ATTENTION AND CONCENTRATION DEFICIT: ICD-10-CM

## 2024-10-30 DIAGNOSIS — R63.5 WEIGHT GAIN: ICD-10-CM

## 2024-10-31 DIAGNOSIS — E66.3 OVERWEIGHT: Primary | ICD-10-CM

## 2024-10-31 RX ORDER — DEXTROAMPHETAMINE SACCHARATE, AMPHETAMINE ASPARTATE, DEXTROAMPHETAMINE SULFATE AND AMPHETAMINE SULFATE 7.5; 7.5; 7.5; 7.5 MG/1; MG/1; MG/1; MG/1
30 TABLET ORAL 2 TIMES DAILY
Qty: 60 TABLET | Refills: 0 | Status: SHIPPED | OUTPATIENT
Start: 2024-10-31

## 2024-10-31 RX ORDER — TIRZEPATIDE 7.5 MG/.5ML
7.5 INJECTION, SOLUTION SUBCUTANEOUS WEEKLY
Qty: 2 ML | Refills: 0 | OUTPATIENT
Start: 2024-10-31

## 2024-10-31 RX ORDER — ALPRAZOLAM 1 MG/1
1 TABLET ORAL 2 TIMES DAILY PRN
Qty: 60 TABLET | Refills: 0 | Status: SHIPPED | OUTPATIENT
Start: 2024-10-31

## 2024-10-31 RX ORDER — TIRZEPATIDE 10 MG/.5ML
10 INJECTION, SOLUTION SUBCUTANEOUS WEEKLY
Qty: 2 ML | Refills: 0 | Status: SHIPPED | OUTPATIENT
Start: 2024-10-31

## 2024-11-14 ENCOUNTER — OFFICE VISIT (OUTPATIENT)
Dept: FAMILY MEDICINE CLINIC | Facility: CLINIC | Age: 49
End: 2024-11-14
Payer: COMMERCIAL

## 2024-11-14 VITALS
WEIGHT: 140.8 LBS | BODY MASS INDEX: 24.95 KG/M2 | HEART RATE: 92 BPM | OXYGEN SATURATION: 97 % | SYSTOLIC BLOOD PRESSURE: 110 MMHG | DIASTOLIC BLOOD PRESSURE: 70 MMHG | HEIGHT: 63 IN

## 2024-11-14 DIAGNOSIS — F41.9 ANXIETY: ICD-10-CM

## 2024-11-14 DIAGNOSIS — F34.1 DYSTHYMIA: ICD-10-CM

## 2024-11-14 DIAGNOSIS — R63.5 WEIGHT GAIN: ICD-10-CM

## 2024-11-14 DIAGNOSIS — R11.2 NAUSEA AND VOMITING, UNSPECIFIED VOMITING TYPE: Primary | ICD-10-CM

## 2024-11-14 DIAGNOSIS — F33.9 DEPRESSION, RECURRENT (HCC): ICD-10-CM

## 2024-11-14 DIAGNOSIS — R41.840 ATTENTION AND CONCENTRATION DEFICIT: ICD-10-CM

## 2024-11-14 PROCEDURE — 99214 OFFICE O/P EST MOD 30 MIN: CPT | Performed by: NURSE PRACTITIONER

## 2024-11-14 RX ORDER — FAMOTIDINE 20 MG/1
20 TABLET, FILM COATED ORAL
Qty: 90 TABLET | Refills: 3 | Status: SHIPPED | OUTPATIENT
Start: 2024-11-14 | End: 2025-11-09

## 2024-11-14 RX ORDER — PANTOPRAZOLE SODIUM 40 MG/1
40 TABLET, DELAYED RELEASE ORAL
Qty: 90 TABLET | Refills: 3 | Status: SHIPPED | OUTPATIENT
Start: 2024-11-14 | End: 2025-11-09

## 2024-11-14 RX ORDER — ONDANSETRON 4 MG/1
4 TABLET, ORALLY DISINTEGRATING ORAL EVERY 6 HOURS PRN
Qty: 20 TABLET | Refills: 0 | Status: SHIPPED | OUTPATIENT
Start: 2024-11-14

## 2024-11-14 NOTE — PROGRESS NOTES
Name: Ana M Sanchez      : 1975      MRN: [id number]  Encounter Provider: VAMSHI Patel  Encounter Date: 2024   Encounter department: [facility] [address] [place]  :  Assessment & Plan  Nausea and vomiting, unspecified vomiting type  Advised to obtain labs.  Referred to GI.  Started on Protonix and Pepcid.  Zofran as needed.  Orders:    Ambulatory Referral to Gastroenterology; Future    pantoprazole (PROTONIX) 40 mg tablet; Take 1 tablet (40 mg total) by mouth daily before breakfast    ondansetron (ZOFRAN-ODT) 4 mg disintegrating tablet; Take 1 tablet (4 mg total) by mouth every 6 (six) hours as needed for nausea or vomiting    famotidine (PEPCID) 20 mg tablet; Take 1 tablet (20 mg total) by mouth daily at bedtime    CBC and differential; Future    Comprehensive metabolic panel; Future    Attention and concentration deficit  Continue on Adderall twice daily.       Weight gain  Patient did lose 30 pounds in 3 months for Zepbound.  We did discuss helping medication as she is vomiting almost daily and nauseous.  Patient is convinced that this is not from Zepbound.  She would like to continue on medication.       Dysthymia           Depression, recurrent (HCC)  States she is doing okay without medication.         Anxiety  Continue on Xanax varyingly as needed.              History of Present Illness     Patient presents for follow up on zepbound. She lost 30 lbs in 3 months. She has been vomiting once daily since prior to the zepbound. She continues vomiting and nausea. She did start protonix      Review of Systems   Constitutional:  Positive for fatigue. Negative for chills, diaphoresis and fever.   HENT:  Negative for ear pain and sore throat.    Eyes:  Negative for pain and visual disturbance.   Respiratory:  Negative for cough, chest tightness, shortness of breath and wheezing.    Cardiovascular:  Negative for chest pain and palpitations.   Gastrointestinal:   "Positive for nausea and vomiting. Negative for abdominal pain, constipation and diarrhea.   Genitourinary:  Negative for dysuria and hematuria.   Musculoskeletal:  Negative for arthralgias and back pain.   Skin:  Negative for color change and rash.   Neurological:  Positive for dizziness, light-headedness and headaches. Negative for seizures and syncope.   Psychiatric/Behavioral:  Positive for dysphoric mood and sleep disturbance. The patient is nervous/anxious.    All other systems reviewed and are negative.    Current Outpatient Medications on File Prior to Visit   Medication Sig Dispense Refill    ALPRAZolam (XANAX) 1 mg tablet Take 1 tablet (1 mg total) by mouth 2 (two) times a day as needed for anxiety 60 tablet 0    amitriptyline (ELAVIL) 75 mg tablet TAKE 1 TABLET (75 MG TOTAL) BY MOUTH DAILY AT BEDTIME 90 tablet 1    amphetamine-dextroamphetamine (ADDERALL, 30MG,) 30 MG tablet Take 1 tablet (30 mg total) by mouth 2 (two) times a day Max Daily Amount: 60 mg 60 tablet 0    tirzepatide (Zepbound) 10 mg/0.5 mL auto-injector Inject 0.5 mL (10 mg total) under the skin once a week 2 mL 0    [DISCONTINUED] SUMAtriptan (Imitrex) 50 mg tablet Take 1 tablet (50 mg total) by mouth once as needed for migraine for up to 1 dose (Patient not taking: Reported on 10/3/2024) 9 tablet 0    [DISCONTINUED] valACYclovir (VALTREX) 500 mg tablet Take 1 tablet (500 mg total) by mouth 2 (two) times a day for 10 days 20 tablet 0     No current facility-administered medications on file prior to visit.         Objective   /70 (BP Location: Left arm, Patient Position: Sitting, Cuff Size: Standard)   Pulse 92   Ht 5' 2.75\" (1.594 m)   Wt 63.9 kg (140 lb 12.8 oz)   SpO2 97%   BMI 25.14 kg/m²      Physical Exam  Vitals and nursing note reviewed.   Constitutional:       General: She is not in acute distress.     Appearance: She is well-developed.   HENT:      Head: Normocephalic and atraumatic.   Eyes:      Conjunctiva/sclera: " Conjunctivae normal.   Cardiovascular:      Rate and Rhythm: Normal rate and regular rhythm.      Heart sounds: No murmur heard.  Pulmonary:      Effort: Pulmonary effort is normal. No respiratory distress.      Breath sounds: Normal breath sounds.   Abdominal:      Palpations: Abdomen is soft.      Tenderness: There is no abdominal tenderness.   Musculoskeletal:         General: No swelling.      Cervical back: Neck supple.   Skin:     General: Skin is warm and dry.      Capillary Refill: Capillary refill takes less than 2 seconds.   Neurological:      Mental Status: She is alert and oriented to person, place, and time.   Psychiatric:         Mood and Affect: Mood normal.       Administrative Statements   I have spent a total time of 15 minutes in caring for this patient on the day of the visit/encounter including Counseling / Coordination of care, Documenting in the medical record, and Reviewing / ordering tests, medicine, procedures  . Topics discussed with the patient / family include symptom assessment and management, medication review, and medication adjustment.

## 2024-11-14 NOTE — ASSESSMENT & PLAN NOTE
Patient did lose 30 pounds in 3 months for Zepbound.  We did discuss helping medication as she is vomiting almost daily and nauseous.  Patient is convinced that this is not from Zepbound.  She would like to continue on medication.

## 2024-11-21 LAB
ALBUMIN SERPL-MCNC: 4.1 G/DL (ref 3.6–5.1)
ALBUMIN/GLOB SERPL: 1.8 (CALC) (ref 1–2.5)
ALP SERPL-CCNC: 47 U/L (ref 31–125)
ALT SERPL-CCNC: 10 U/L (ref 6–29)
AST SERPL-CCNC: 13 U/L (ref 10–35)
BASOPHILS # BLD AUTO: 41 CELLS/UL (ref 0–200)
BASOPHILS NFR BLD AUTO: 0.9 %
BILIRUB SERPL-MCNC: 0.5 MG/DL (ref 0.2–1.2)
BUN SERPL-MCNC: 17 MG/DL (ref 7–25)
BUN/CREAT SERPL: NORMAL (CALC) (ref 6–22)
CALCIUM SERPL-MCNC: 9.2 MG/DL (ref 8.6–10.2)
CHLORIDE SERPL-SCNC: 103 MMOL/L (ref 98–110)
CO2 SERPL-SCNC: 29 MMOL/L (ref 20–32)
CREAT SERPL-MCNC: 0.9 MG/DL (ref 0.5–0.99)
EOSINOPHIL # BLD AUTO: 59 CELLS/UL (ref 15–500)
EOSINOPHIL NFR BLD AUTO: 1.3 %
ERYTHROCYTE [DISTWIDTH] IN BLOOD BY AUTOMATED COUNT: 12.7 % (ref 11–15)
GFR/BSA.PRED SERPLBLD CYS-BASED-ARV: 79 ML/MIN/1.73M2
GLOBULIN SER CALC-MCNC: 2.3 G/DL (CALC) (ref 1.9–3.7)
GLUCOSE SERPL-MCNC: 88 MG/DL (ref 65–99)
HCT VFR BLD AUTO: 40.8 % (ref 35–45)
HGB BLD-MCNC: 13.9 G/DL (ref 11.7–15.5)
LYMPHOCYTES # BLD AUTO: 1688 CELLS/UL (ref 850–3900)
LYMPHOCYTES NFR BLD AUTO: 37.5 %
MCH RBC QN AUTO: 31.5 PG (ref 27–33)
MCHC RBC AUTO-ENTMCNC: 34.1 G/DL (ref 32–36)
MCV RBC AUTO: 92.5 FL (ref 80–100)
MONOCYTES # BLD AUTO: 396 CELLS/UL (ref 200–950)
MONOCYTES NFR BLD AUTO: 8.8 %
NEUTROPHILS # BLD AUTO: 2318 CELLS/UL (ref 1500–7800)
NEUTROPHILS NFR BLD AUTO: 51.5 %
PLATELET # BLD AUTO: 359 THOUSAND/UL (ref 140–400)
PMV BLD REES-ECKER: 10.2 FL (ref 7.5–12.5)
POTASSIUM SERPL-SCNC: 3.6 MMOL/L (ref 3.5–5.3)
PROT SERPL-MCNC: 6.4 G/DL (ref 6.1–8.1)
RBC # BLD AUTO: 4.41 MILLION/UL (ref 3.8–5.1)
SODIUM SERPL-SCNC: 138 MMOL/L (ref 135–146)
WBC # BLD AUTO: 4.5 THOUSAND/UL (ref 3.8–10.8)

## 2024-11-22 ENCOUNTER — TELEPHONE (OUTPATIENT)
Age: 49
End: 2024-11-22

## 2024-11-25 DIAGNOSIS — E66.3 OVERWEIGHT: Primary | ICD-10-CM

## 2024-11-25 DIAGNOSIS — B00.1 RECURRENT COLD SORES: Primary | ICD-10-CM

## 2024-11-25 RX ORDER — VALACYCLOVIR HYDROCHLORIDE 1 G/1
1000 TABLET, FILM COATED ORAL 2 TIMES DAILY
Qty: 20 TABLET | Refills: 0 | Status: SHIPPED | OUTPATIENT
Start: 2024-11-25 | End: 2024-12-05

## 2024-11-25 RX ORDER — TIRZEPATIDE 12.5 MG/.5ML
12.5 INJECTION, SOLUTION SUBCUTANEOUS WEEKLY
Qty: 2 ML | Refills: 0 | Status: SHIPPED | OUTPATIENT
Start: 2024-11-25 | End: 2024-11-26 | Stop reason: SDUPTHER

## 2024-11-26 ENCOUNTER — TELEPHONE (OUTPATIENT)
Age: 49
End: 2024-11-26

## 2024-11-26 DIAGNOSIS — E66.3 OVERWEIGHT: ICD-10-CM

## 2024-11-26 RX ORDER — TIRZEPATIDE 12.5 MG/.5ML
12.5 INJECTION, SOLUTION SUBCUTANEOUS WEEKLY
Qty: 2 ML | Refills: 0 | Status: SHIPPED | OUTPATIENT
Start: 2024-11-26

## 2024-11-26 NOTE — TELEPHONE ENCOUNTER
Please resend tirzepatide (Zepbound) 12.5 mg/0.5 mL auto-injector     Should be sent to [facility]armacy

## 2024-12-05 NOTE — OP NOTE
He has passport, it is available  OPERATIVE REPORT  PATIENT NAME: Ana M Farr    :  1975  MRN: [id number]  Pt Location: [facility] OR ROOM 04    SURGERY DATE: 2017    Surgeon(s) and Role:     * Nahum Concepcion PA-C - Assisting     * Eddie Early MD - Primary    Preop Diagnosis:  Left inguinal hernia [K40 90]    Post-Op Diagnosis Codes:     * Left inguinal hernia [K40 90]    Procedure(s) (LRB):  LAPAROSCOPIC INGUINAL HERNIA REPAIR WITH MESH (Left)    Specimen(s):  * No specimens in log *    Estimated Blood Loss:   Minimal    Drains:       Anesthesia Type:   General    Operative Indications:  Left inguinal hernia []  71-year-old female who noted a bulge from the left groin for approximately 2 years, she had the umbilical hernia and right inguinal hernia done in the past   The patient was advised to undergo left inguinal hernia repair with mesh  Operative Findings: There was a left direct inguinal hernia  Complications:   None    Procedure and Technique:  The patient was identified then she was placed in the operating table in a supine position  After adequate anesthesia induction and satisfactory endotracheal intubation the abdomen was prepped and draped under sterile usual fashion with ChloraPrep  Timeout was called the patient was identified as well as surgical site  An infra umbilical incision was made with a scalpel, taken down through the subcutaneous tissue with cautery  The fascia of the rectus muscle was opened with electrocautery in a transverse fashion  The rectus muscle was retracted laterally and with blunt finger dissection a space was created posterior to the rectus muscle  The balloon dissector was inserted into the preperitoneal space and insufflated under direct vision  The balloon was inflated and subsequently retrieved  The structural balloon was inserted into the preperitoneal space and the balloon was inflated  The preperitoneal space was insufflated with CO2 and the scope was advanced   5 mm trocars were introduced in the suprapubic area ×2 under direct vision  At this point the symphysis pubis and Syd's ligament on the left side were identified  The inferior epigastric vessels were identified  The round ligament was identified  I proceeded to dissect between the inferior epigastric vessels and the round ligament with the dissector until the lateral wall was identified  At this point I proceeded to dissect medially towards the round ligament  The hernia sac was dissected off round ligament as high as the superior iliac spine and the round ligament was divided using cautery without evidence of bleeding  At this point a 12 x 15 cm soft Bard mesh was placed and tacked it to the anterior abdominal wall with SorbaFix  The mesh was allowed to stretch out completely and the CO2 was deflated  The ports were removed under direct vision with no evidence of bleeding from the abdominal wall  The structural balloon was deflated and subsequently retrieved  The infraumbilical port site fascia was closed with a 0 Vicryl in an interrupted figure-of-eight fashion  The subcutaneous tissue was infiltrated with 0 25% of Marcaine and the skin was closed with a 4-0 Vicryl in an interrupted subcuticular fashion  Sterile dressing were applied  At the end of the case instrument, needles, sponges counts were correct  The patient tolerated the procedure well and then he was transferred to recovery room in a stable conditions  The physician assistant was crucial for the entrance into the preperitoneal , dissection of the hernia sac, placement of the mesh and closure of the abdominal wall     I was present for the entire procedure and A qualified resident physician was not available    Patient Disposition:  PACU     SIGNATURE: Leo Amaya MD  DATE: September 27, 2017  TIME: 9:55 AM

## 2024-12-24 DIAGNOSIS — R41.840 ATTENTION AND CONCENTRATION DEFICIT: ICD-10-CM

## 2024-12-24 DIAGNOSIS — E66.3 OVERWEIGHT: ICD-10-CM

## 2024-12-24 RX ORDER — TIRZEPATIDE 12.5 MG/.5ML
12.5 INJECTION, SOLUTION SUBCUTANEOUS WEEKLY
Qty: 2 ML | Refills: 0 | Status: CANCELLED | OUTPATIENT
Start: 2024-12-24

## 2024-12-24 RX ORDER — DEXTROAMPHETAMINE SACCHARATE, AMPHETAMINE ASPARTATE, DEXTROAMPHETAMINE SULFATE AND AMPHETAMINE SULFATE 7.5; 7.5; 7.5; 7.5 MG/1; MG/1; MG/1; MG/1
30 TABLET ORAL 2 TIMES DAILY
Qty: 60 TABLET | Refills: 0 | Status: SHIPPED | OUTPATIENT
Start: 2024-12-24

## 2024-12-24 RX ORDER — TIRZEPATIDE 12.5 MG/.5ML
12.5 INJECTION, SOLUTION SUBCUTANEOUS WEEKLY
Qty: 2 ML | Refills: 0 | Status: SHIPPED | OUTPATIENT
Start: 2024-12-24 | End: 2024-12-27

## 2024-12-26 ENCOUNTER — CONSULT (OUTPATIENT)
Dept: GASTROENTEROLOGY | Facility: CLINIC | Age: 49
End: 2024-12-26
Payer: COMMERCIAL

## 2024-12-26 ENCOUNTER — TELEPHONE (OUTPATIENT)
Dept: FAMILY MEDICINE CLINIC | Facility: CLINIC | Age: 49
End: 2024-12-26

## 2024-12-26 VITALS
WEIGHT: 127 LBS | SYSTOLIC BLOOD PRESSURE: 112 MMHG | OXYGEN SATURATION: 95 % | HEART RATE: 83 BPM | DIASTOLIC BLOOD PRESSURE: 86 MMHG | BODY MASS INDEX: 23.37 KG/M2 | HEIGHT: 62 IN | TEMPERATURE: 99.4 F

## 2024-12-26 DIAGNOSIS — K21.9 GASTROESOPHAGEAL REFLUX DISEASE WITHOUT ESOPHAGITIS: Primary | ICD-10-CM

## 2024-12-26 DIAGNOSIS — R11.2 NAUSEA AND VOMITING, UNSPECIFIED VOMITING TYPE: ICD-10-CM

## 2024-12-26 DIAGNOSIS — R10.31 RIGHT LOWER QUADRANT ABDOMINAL PAIN: ICD-10-CM

## 2024-12-26 PROCEDURE — 99204 OFFICE O/P NEW MOD 45 MIN: CPT | Performed by: INTERNAL MEDICINE

## 2024-12-26 RX ORDER — SODIUM CHLORIDE, SODIUM LACTATE, POTASSIUM CHLORIDE, CALCIUM CHLORIDE 600; 310; 30; 20 MG/100ML; MG/100ML; MG/100ML; MG/100ML
125 INJECTION, SOLUTION INTRAVENOUS CONTINUOUS
OUTPATIENT
Start: 2024-12-26

## 2024-12-26 NOTE — ASSESSMENT & PLAN NOTE
Is a 49-year-old female with known GERD with periumbilical abdominal pain and nausea vomiting starting in August who then went on Zepbound and had 43 pounds of weight loss and had recurrent heartburn regurgitation nausea vomiting and constipation that is now improving on pantoprazole and amitriptyline given to her by her primary.  She was having GI symptoms prior to going on Zepbound though.  She had a negative endoscopy and colonoscopy with me in 2017.    1 we will do an EGD and colonoscopy to investigate    2 CT imaging of the abdomen pelvis with IV contrast.  She also had a negative ultrasound of her gallbladder    3 for now she will continue on the pantoprazole 40 mg in the morning and the Pepcid at bedtime    4 for now she will continue on amitriptyline 75 mg which has reduced her abdominal pain        Orders:    Ambulatory referral to Gastroenterology    Ambulatory Referral to Gastroenterology    EGD; Future

## 2024-12-26 NOTE — H&P (VIEW-ONLY)
Name: Ana M Sanchez      : 1975      MRN: [id number]  Encounter Provider: Alejandro Veloz III, MD  Encounter Date: 2024   Encounter department: [facility] [place]  :  Assessment & Plan  Nausea and vomiting, unspecified vomiting type  Is a 49-year-old female with known GERD with periumbilical abdominal pain and nausea vomiting starting in August who then went on Zepbound and had 43 pounds of weight loss and had recurrent heartburn regurgitation nausea vomiting and constipation that is now improving on pantoprazole and amitriptyline given to her by her primary.  She was having GI symptoms prior to going on Zepbound though.  She had a negative endoscopy and colonoscopy with me in 2017.    1 we will do an EGD and colonoscopy to investigate    2 CT imaging of the abdomen pelvis with IV contrast.  She also had a negative ultrasound of her gallbladder    3 for now she will continue on the pantoprazole 40 mg in the morning and the Pepcid at bedtime    4 for now she will continue on amitriptyline 75 mg which has reduced her abdominal pain        Orders:    Ambulatory referral to Gastroenterology    Ambulatory Referral to Gastroenterology    EGD; Future    Right lower quadrant abdominal pain    Orders:    Ambulatory referral to Gastroenterology    Colonoscopy; Future    Gastroesophageal reflux disease without esophagitis    Orders:    EGD; Future        History of Present Illness   HPI  Ana M Sanchez is a 49 y.o. female who presents for evaluation of abdominal pain nausea vomiting and weight loss.  She started to have abdominal symptoms in August.  She was having nausea and vomiting and regurgitation.  She saw her primary.  She went on Zepbound and continued to have abdominal pain nausea and vomiting.  She is lost 43 pounds on Zepbound.  She was 170.  She is now 127 pounds.  She has a prior history of GERD.  She reports that regurgitation was the major reflux symptoms with  heartburn.  She had no solid liquid food dysphagia she was having greater than 2 episodes of vomiting a day.  Primary in this Zepbound.  Put her on amitriptyline 75 mg.  She is now on pantoprazole in the morning and Pepcid at night.  Her reflux symptoms are a lot better.  Her abdominal pain is a lot better.  She had ultrasound imaging of her gallbladder that was normal.  She had CT CT imaging of her abdomen that was normal.  She is having constipation with a bowel movement every 3 days she has no melena medic easy.  She had normal endoscopy colonoscopy with me in 2017.    History obtained from: patient    Review of Systems  Past Medical History   Past Medical History:   Diagnosis Date    Anxiety     Chronic pain disorder     lumbar radiculopathy    Depression     GERD (gastroesophageal reflux disease)     Headache     Headache(784.0)      Past Surgical History:   Procedure Laterality Date    CARPAL TUNNEL RELEASE Bilateral     CERVICAL BIOPSY  W/ LOOP ELECTRODE EXCISION      HERNIA REPAIR Right     inguinal hernia    HERNIA REPAIR Left 09/27/2017    Procedure: LAPAROSCOPIC INGUINAL HERNIA REPAIR WITH MESH;  Surgeon: Nghia Bruno MD;  Location: [facility];  Service: General    [place] ESOPHAGOGASTRODUODENOSCOPY TRANSORAL DIAGNOSTIC N/A 02/24/2017    Procedure: EGD AND COLONOSCOPY;  Surgeon: Alejandro Veloz III, MD;  Location: [facility];  Service: Gastroenterology    TUBAL LIGATION      UMBILICAL HERNIA REPAIR      WISDOM TOOTH EXTRACTION       Family History   Problem Relation Age of Onset    Depression Mother     Stroke Father     No Known Problems Brother     Breast cancer Maternal Grandmother     Cancer Family       reports that she has never smoked. She has never been exposed to tobacco smoke. She has never used smokeless tobacco. She reports that she does not drink alcohol and does not use drugs.  Current Outpatient Medications on File Prior to Visit   Medication Sig Dispense Refill    ALPRAZolam (XANAX) 1 mg  "tablet Take 1 tablet (1 mg total) by mouth 2 (two) times a day as needed for anxiety 60 tablet 0    amitriptyline (ELAVIL) 75 mg tablet TAKE 1 TABLET (75 MG TOTAL) BY MOUTH DAILY AT BEDTIME 90 tablet 1    amphetamine-dextroamphetamine (ADDERALL, 30MG,) 30 MG tablet Take 1 tablet (30 mg total) by mouth 2 (two) times a day Max Daily Amount: 60 mg 60 tablet 0    famotidine (PEPCID) 20 mg tablet Take 1 tablet (20 mg total) by mouth daily at bedtime 90 tablet 3    ondansetron (ZOFRAN-ODT) 4 mg disintegrating tablet Take 1 tablet (4 mg total) by mouth every 6 (six) hours as needed for nausea or vomiting 20 tablet 0    pantoprazole (PROTONIX) 40 mg tablet Take 1 tablet (40 mg total) by mouth daily before breakfast 90 tablet 3    tirzepatide (Zepbound) 12.5 mg/0.5 mL auto-injector Inject 0.5 mL (12.5 mg total) under the skin once a week 2 mL 0    valACYclovir (VALTREX) 1,000 mg tablet Take 1 tablet (1,000 mg total) by mouth 2 (two) times a day for 10 days 20 tablet 0     No current facility-administered medications on file prior to visit.     Allergies   Allergen Reactions    Codeine Other (See Comments)         Objective   /86 (BP Location: Right arm, Patient Position: Sitting, Cuff Size: Standard)   Pulse 83   Temp 99.4 °F (37.4 °C) (Tympanic)   Ht 5' 2\" (1.575 m)   Wt 57.6 kg (127 lb)   SpO2 95%   BMI 23.23 kg/m²      Physical Exam  Heart S1-S2  Lungs are clear to auscultation bilaterally    "

## 2024-12-26 NOTE — PATIENT INSTRUCTIONS
Scheduled date of EGD/colonoscopy (as of today):1/8/25  Physician performing EGD/colonoscopy:Magdiel  Location of EGD/colonoscopy:[facility]  Desired bowel prep reviewed with patient:miralax/dulcolax  Instructions reviewed with patient by:Emily BOYD  Clearances:   none    Zepbound 7 day hold

## 2024-12-26 NOTE — TELEPHONE ENCOUNTER
Patient called the RX Refill Line. Message is being forwarded to the office.     Patient called due to her Zepbound was supposed to increase to 15 mg but when she picked it up from the pharmacy it was 12.5.    Please review and contact patient directly to discuss next step

## 2024-12-26 NOTE — PROGRESS NOTES
Name: Ana M Sanchez      : 1975      MRN: [id number]  Encounter Provider: Alejandro Veloz III, MD  Encounter Date: 2024   Encounter department: [facility] [place]  :  Assessment & Plan  Nausea and vomiting, unspecified vomiting type  Is a 49-year-old female with known GERD with periumbilical abdominal pain and nausea vomiting starting in August who then went on Zepbound and had 43 pounds of weight loss and had recurrent heartburn regurgitation nausea vomiting and constipation that is now improving on pantoprazole and amitriptyline given to her by her primary.  She was having GI symptoms prior to going on Zepbound though.  She had a negative endoscopy and colonoscopy with me in 2017.    1 we will do an EGD and colonoscopy to investigate    2 CT imaging of the abdomen pelvis with IV contrast.  She also had a negative ultrasound of her gallbladder    3 for now she will continue on the pantoprazole 40 mg in the morning and the Pepcid at bedtime    4 for now she will continue on amitriptyline 75 mg which has reduced her abdominal pain        Orders:    Ambulatory referral to Gastroenterology    Ambulatory Referral to Gastroenterology    EGD; Future    Right lower quadrant abdominal pain    Orders:    Ambulatory referral to Gastroenterology    Colonoscopy; Future    Gastroesophageal reflux disease without esophagitis    Orders:    EGD; Future        History of Present Illness   HPI  Ana M Sanchez is a 49 y.o. female who presents for evaluation of abdominal pain nausea vomiting and weight loss.  She started to have abdominal symptoms in August.  She was having nausea and vomiting and regurgitation.  She saw her primary.  She went on Zepbound and continued to have abdominal pain nausea and vomiting.  She is lost 43 pounds on Zepbound.  She was 170.  She is now 127 pounds.  She has a prior history of GERD.  She reports that regurgitation was the major reflux symptoms with  heartburn.  She had no solid liquid food dysphagia she was having greater than 2 episodes of vomiting a day.  Primary in this Zepbound.  Put her on amitriptyline 75 mg.  She is now on pantoprazole in the morning and Pepcid at night.  Her reflux symptoms are a lot better.  Her abdominal pain is a lot better.  She had ultrasound imaging of her gallbladder that was normal.  She had CT CT imaging of her abdomen that was normal.  She is having constipation with a bowel movement every 3 days she has no melena medic easy.  She had normal endoscopy colonoscopy with me in 2017.    History obtained from: patient    Review of Systems  Past Medical History   Past Medical History:   Diagnosis Date    Anxiety     Chronic pain disorder     lumbar radiculopathy    Depression     GERD (gastroesophageal reflux disease)     Headache     Headache(784.0)      Past Surgical History:   Procedure Laterality Date    CARPAL TUNNEL RELEASE Bilateral     CERVICAL BIOPSY  W/ LOOP ELECTRODE EXCISION      HERNIA REPAIR Right     inguinal hernia    HERNIA REPAIR Left 09/27/2017    Procedure: LAPAROSCOPIC INGUINAL HERNIA REPAIR WITH MESH;  Surgeon: Nghia Bruno MD;  Location: [facility];  Service: General    [place] ESOPHAGOGASTRODUODENOSCOPY TRANSORAL DIAGNOSTIC N/A 02/24/2017    Procedure: EGD AND COLONOSCOPY;  Surgeon: Alejandro Veloz III, MD;  Location: [facility];  Service: Gastroenterology    TUBAL LIGATION      UMBILICAL HERNIA REPAIR      WISDOM TOOTH EXTRACTION       Family History   Problem Relation Age of Onset    Depression Mother     Stroke Father     No Known Problems Brother     Breast cancer Maternal Grandmother     Cancer Family       reports that she has never smoked. She has never been exposed to tobacco smoke. She has never used smokeless tobacco. She reports that she does not drink alcohol and does not use drugs.  Current Outpatient Medications on File Prior to Visit   Medication Sig Dispense Refill    ALPRAZolam (XANAX) 1 mg  "tablet Take 1 tablet (1 mg total) by mouth 2 (two) times a day as needed for anxiety 60 tablet 0    amitriptyline (ELAVIL) 75 mg tablet TAKE 1 TABLET (75 MG TOTAL) BY MOUTH DAILY AT BEDTIME 90 tablet 1    amphetamine-dextroamphetamine (ADDERALL, 30MG,) 30 MG tablet Take 1 tablet (30 mg total) by mouth 2 (two) times a day Max Daily Amount: 60 mg 60 tablet 0    famotidine (PEPCID) 20 mg tablet Take 1 tablet (20 mg total) by mouth daily at bedtime 90 tablet 3    ondansetron (ZOFRAN-ODT) 4 mg disintegrating tablet Take 1 tablet (4 mg total) by mouth every 6 (six) hours as needed for nausea or vomiting 20 tablet 0    pantoprazole (PROTONIX) 40 mg tablet Take 1 tablet (40 mg total) by mouth daily before breakfast 90 tablet 3    tirzepatide (Zepbound) 12.5 mg/0.5 mL auto-injector Inject 0.5 mL (12.5 mg total) under the skin once a week 2 mL 0    valACYclovir (VALTREX) 1,000 mg tablet Take 1 tablet (1,000 mg total) by mouth 2 (two) times a day for 10 days 20 tablet 0     No current facility-administered medications on file prior to visit.     Allergies   Allergen Reactions    Codeine Other (See Comments)         Objective   /86 (BP Location: Right arm, Patient Position: Sitting, Cuff Size: Standard)   Pulse 83   Temp 99.4 °F (37.4 °C) (Tympanic)   Ht 5' 2\" (1.575 m)   Wt 57.6 kg (127 lb)   SpO2 95%   BMI 23.23 kg/m²      Physical Exam  Heart S1-S2  Lungs are clear to auscultation bilaterally    "

## 2024-12-27 DIAGNOSIS — E66.3 OVERWEIGHT: Primary | ICD-10-CM

## 2024-12-27 DIAGNOSIS — E66.3 OVERWEIGHT: ICD-10-CM

## 2024-12-27 RX ORDER — TIRZEPATIDE 15 MG/.5ML
15 INJECTION, SOLUTION SUBCUTANEOUS WEEKLY
Qty: 2 ML | Refills: 0 | Status: SHIPPED | OUTPATIENT
Start: 2024-12-27 | End: 2024-12-27 | Stop reason: SDUPTHER

## 2024-12-27 RX ORDER — TIRZEPATIDE 15 MG/.5ML
15 INJECTION, SOLUTION SUBCUTANEOUS WEEKLY
Qty: 2 ML | Refills: 0 | Status: SHIPPED | OUTPATIENT
Start: 2024-12-27

## 2024-12-27 NOTE — TELEPHONE ENCOUNTER
Called patient and she is aware and said it probably will get denied because she already picked up the 12.5mg and finish it a d then start the 15mg for her next script

## 2024-12-27 NOTE — TELEPHONE ENCOUNTER
Reason for call:   [x] Refill   [] Prior Auth  [x] Other: NOT A DUPLICATE. Was sent to the wrong pharmacy    Office:   [x] PCP/Provider -  VAMSHI Patel   [] Specialty/Provider -     Medication: tirzepatide (Zepbound) 15 mg/0.5 mL auto-injector     Dose/Frequency: Inject 0.5 mL (15 mg total) under the skin once a week     Quantity: 2 mL    Pharmacy: [address] [phone]     Does the patient have enough for 3 days?   [] Yes   [x] No - Send as HP to POD

## 2025-01-02 ENCOUNTER — OFFICE VISIT (OUTPATIENT)
Age: 50
End: 2025-01-02
Payer: COMMERCIAL

## 2025-01-02 VITALS
OXYGEN SATURATION: 99 % | BODY MASS INDEX: 23 KG/M2 | DIASTOLIC BLOOD PRESSURE: 80 MMHG | HEIGHT: 62 IN | SYSTOLIC BLOOD PRESSURE: 110 MMHG | WEIGHT: 125 LBS | HEART RATE: 109 BPM

## 2025-01-02 DIAGNOSIS — Z12.31 SCREENING MAMMOGRAM FOR BREAST CANCER: ICD-10-CM

## 2025-01-02 DIAGNOSIS — Z01.419 ENCOUNTER FOR GYNECOLOGICAL EXAMINATION (GENERAL) (ROUTINE) WITHOUT ABNORMAL FINDINGS: Primary | ICD-10-CM

## 2025-01-02 DIAGNOSIS — Z11.3 SCREEN FOR STD (SEXUALLY TRANSMITTED DISEASE): ICD-10-CM

## 2025-01-02 DIAGNOSIS — Z12.4 SCREENING FOR CERVICAL CANCER: ICD-10-CM

## 2025-01-02 PROBLEM — K46.9 ABDOMINAL HERNIA: Status: ACTIVE | Noted: 2017-09-08

## 2025-01-02 PROBLEM — G43.009 MIGRAINE WITHOUT AURA AND WITHOUT STATUS MIGRAINOSUS, NOT INTRACTABLE: Status: ACTIVE | Noted: 2017-11-07

## 2025-01-02 PROBLEM — K58.9 IBS (IRRITABLE BOWEL SYNDROME): Status: ACTIVE | Noted: 2017-01-18

## 2025-01-02 PROBLEM — R63.5 WEIGHT GAIN: Status: RESOLVED | Noted: 2019-05-08 | Resolved: 2025-01-02

## 2025-01-02 PROBLEM — G89.29 CHRONIC MIDLINE LOW BACK PAIN WITHOUT SCIATICA: Status: ACTIVE | Noted: 2017-07-27

## 2025-01-02 PROBLEM — M54.50 CHRONIC MIDLINE LOW BACK PAIN WITHOUT SCIATICA: Status: ACTIVE | Noted: 2017-07-27

## 2025-01-02 PROBLEM — R11.2 NAUSEA AND VOMITING: Status: RESOLVED | Noted: 2024-12-26 | Resolved: 2025-01-02

## 2025-01-02 PROBLEM — M51.26 LUMBAR DISC HERNIATION: Status: ACTIVE | Noted: 2017-05-25

## 2025-01-02 PROBLEM — D17.1 LIPOMA OF BACK: Status: ACTIVE | Noted: 2017-03-29

## 2025-01-02 PROBLEM — G56.03 CARPAL TUNNEL SYNDROME, BILATERAL: Status: ACTIVE | Noted: 2017-11-01

## 2025-01-02 PROCEDURE — S0612 ANNUAL GYNECOLOGICAL EXAMINA: HCPCS | Performed by: NURSE PRACTITIONER

## 2025-01-02 PROCEDURE — G0476 HPV COMBO ASSAY CA SCREEN: HCPCS | Performed by: NURSE PRACTITIONER

## 2025-01-02 PROCEDURE — 87591 N.GONORRHOEAE DNA AMP PROB: CPT | Performed by: NURSE PRACTITIONER

## 2025-01-02 PROCEDURE — 87491 CHLMYD TRACH DNA AMP PROBE: CPT | Performed by: NURSE PRACTITIONER

## 2025-01-02 PROCEDURE — G0145 SCR C/V CYTO,THINLAYER,RESCR: HCPCS | Performed by: PATHOLOGY

## 2025-01-02 NOTE — PROGRESS NOTES
April was seen today for gynecologic exam.    Diagnoses and all orders for this visit:    Encounter for gynecological examination (general) (routine) without abnormal findings    Screening for cervical cancer  -     Liquid-based pap, screening    Screen for STD (sexually transmitted disease)  -     Chlamydia/GC amplified DNA by PCR    Screening mammogram for breast cancer  Comments:  ordered by PCP    Calcium/vit d inclusion in the diet discussed, call with any issues, SBE reinforced, all concerns addressed.           Pleasant 49 y.o. premenopausal female here for annual exam. She denies any issues with heavy bleeding or her menses. She reports she was having irregular menses x 1 year-often 2 menses per month bleeding 3-4 days typically and was bleeding up to 3 weeks per month with varied flow. (Seen by Maday GARRETT in October). She states her menses are back to being regular so she has not done the Pelvic u/s ordered in October. Small fibroid seen on recent CT scan. Menarche at 19. She denies history of abnormal pap smears. Last Pap was done years ago, pap with cotest was done today. She denies vaginal issues. She denies pelvic pain. She denies dyspareunia. She denies any issues with her BCM. She is sexually active without any concerns. She requests an STD screen. Last mammogram was done on 08/01/2017, order has been placed. Colonoscopy and Endoscopy due and are scheduled for this month. She is on Zepbound and has lost 40 lbs since then.    Could consider progesterone only contraceptive for irregular menses if they return.   Estrogen not recommended due to risk of stroke with hx of migraine with aura.     Past Surgical History:   Procedure Laterality Date    CARPAL TUNNEL RELEASE Bilateral     CERVICAL BIOPSY  W/ LOOP ELECTRODE EXCISION      HERNIA REPAIR Right     inguinal hernia    HERNIA REPAIR Left 09/27/2017    Procedure: LAPAROSCOPIC INGUINAL HERNIA REPAIR WITH MESH;  Surgeon: Nghia Bruno MD;  Location: [facility];  Service: General    [place] ESOPHAGOGASTRODUODENOSCOPY TRANSORAL DIAGNOSTIC N/A 02/24/2017    Procedure: EGD AND COLONOSCOPY;  Surgeon: Alejandro Veloz III, MD;  Location: [facility];  Service: Gastroenterology    TUBAL LIGATION      UMBILICAL HERNIA REPAIR      WISDOM TOOTH EXTRACTION       Family History   Problem Relation Age of Onset    Depression Mother     Stroke Father     No Known Problems Brother     Breast cancer Maternal Grandmother     Cancer Family      Social History     Tobacco Use    Smoking status: Never     Passive exposure: Never    Smokeless tobacco: Never   Vaping Use    Vaping status: Never Used   Substance Use Topics    Alcohol use: No    Drug use: No       Current Outpatient Medications:     ALPRAZolam (XANAX) 1 mg tablet, Take 1 tablet (1 mg total) by mouth 2 (two) times a day as needed for anxiety, Disp: 60 tablet, Rfl: 0    amitriptyline (ELAVIL) 75 mg tablet, TAKE 1 TABLET (75 MG TOTAL) BY MOUTH DAILY AT BEDTIME, Disp: 90 tablet, Rfl: 1    amphetamine-dextroamphetamine (ADDERALL, 30MG,) 30 MG tablet, Take 1 tablet (30 mg total) by mouth 2 (two) times a day Max Daily Amount: 60 mg, Disp: 60 tablet, Rfl: 0    famotidine (PEPCID) 20 mg tablet, Take 1 tablet (20 mg total) by mouth daily at bedtime, Disp: 90 tablet, Rfl: 3    ondansetron (ZOFRAN-ODT) 4 mg disintegrating tablet, Take 1 tablet (4 mg total) by mouth every 6 (six) hours as needed for nausea or vomiting, Disp: 20 tablet, Rfl: 0    pantoprazole (PROTONIX) 40 mg tablet, Take 1 tablet (40 mg total) by mouth daily before breakfast, Disp: 90 tablet, Rfl: 3    tirzepatide (Zepbound) 15 mg/0.5 mL auto-injector, Inject 0.5 mL (15 mg total) under the skin once a week, Disp: 2 mL, Rfl: 0    valACYclovir (VALTREX) 1,000 mg tablet, Take 1 tablet (1,000 mg total) by mouth 2 (two) times a day for 10 days, Disp: 20 tablet, Rfl: 0  Patient Active Problem List    Diagnosis Date Noted    Right lower quadrant abdominal pain 12/26/2024     "Depression, recurrent (HCC) 2022    Attention and concentration deficit 2022    Anxiety 2021    Back pain with radiculopathy 2019    Gastroesophageal reflux disease without esophagitis 2018    Tenosynovitis of left foot 2018    Shift work sleep disorder 2018    Migraine without aura and without status migrainosus, not intractable 2017    Carpal tunnel syndrome, bilateral 2017    Abdominal hernia 2017    Chronic midline low back pain without sciatica 2017    Lumbar disc herniation 2017    Lipoma of back 2017    IBS (irritable bowel syndrome) 2017    Lumbar radiculopathy 2016    Chronic pain disorder 2016       Allergies   Allergen Reactions    Codeine Other (See Comments)       OB History    Para Term  AB Living   2 2 2   2   SAB IAB Ectopic Multiple Live Births       2      # Outcome Date GA Lbr Sunny/2nd Weight Sex Type Anes PTL Lv   2 Term      Vag-Spont   SANDRA   1 Term      Vag-Spont   SANDRA      Obstetric Comments   Full term x 2     1 son and 1 daughter 16 and 29  1 granddaughter 4 yrs  Works [employer], part time      Vitals:    25 1112   BP: 110/80   BP Location: Left arm   Patient Position: Sitting   Cuff Size: Standard   Pulse: (!) 109   SpO2: 99%   Weight: 56.7 kg (125 lb)   Height: 5' 2\" (1.575 m)     Body mass index is 22.86 kg/m².  Patient's last menstrual period was 12/15/2024 (approximate).    Review of Systems   Constitutional: Negative for chills, fatigue, fever and unexpected weight change.   Respiratory: Negative for shortness of breath.    Gastrointestinal: Negative for anal bleeding, blood in stool, constipation and diarrhea.   Genitourinary: Negative for difficulty urinating, dysuria and hematuria.     Physical Exam   Constitutional: She appears well-developed and well-nourished. No distress.   HENT: atraumatic  Head: Normocephalic.   Neck: Normal range of motion. Neck supple.   Pulmonary: " Effort normal.  Breasts: bilateral without masses, skin changes or nipple discharge. Bilaterally soft and warm to touch. No areas of erythema or pain.  Abdominal: Soft.   Pelvic exam was performed with patient supine. No labial fusion. There is no rash, tenderness, lesion or injury on the right labia. There is no rash, tenderness, lesion or injury on the left labia. Urethral meatus does not show any tenderness, inflammation or discharge. Palpation of midline bladder without pain or discomfort.Uterus is not deviated, not enlarged, not fixed and not tender. Cervix exhibits no motion tenderness, no discharge and no friability. POSSIBLE LESION/WHITENING NOTED AT 6 O'CLOCK, CONTACT BLED AFTER PAP. Right adnexum displays no mass, no tenderness and no fullness. Left adnexum displays no mass, no tenderness and no fullness. No erythema or tenderness in the vagina. No foreign body in the vagina. No signs of injury around the vagina. No vaginal discharge found. No signs of injury around the vagina or anus. Perineum without lesions, signs of injury, erythema or swelling.  Lymphadenopathy:        Right: No inguinal adenopathy present.        Left: No inguinal adenopathy present.

## 2025-01-02 NOTE — PATIENT INSTRUCTIONS
Patient Education     Lowering Your Risk of Breast Cancer   About this topic   Breast cancer is a serious illness. Breast cancer is when abnormal cells grow and divide more quickly in your breast. These cells form a growth or tumor. The abnormal cells may enter nearby tissue and spread to other parts of the body. It is the type of cancer most often seen in women. Men can have breast cancer, but it is a rare condition.  General   Some things in your life may increase your risk of breast cancer. You may not be able to change some of these. Others you can control.  You are more likely to get breast cancer if you:  Have a mother, sister, or daughter who has had breast cancer  Have used hormones for menopause for more than 5 years  Have had radiation therapy to the breast or chest in the past  Are overweight or do not exercise  Had your first menstrual period before you were 11 years old  Went through menopause after age 55  Have never been pregnant or had your first child after age 35  Have had breast cancer before  Drink alcohol in any form  Have dense breasts  Are older in age  There is no certain way to prevent breast cancer. There are things you can do to lower your chances of having breast cancer.  Keep a healthy weight. Lose weight if you are overweight. Being overweight raises your chances of having breast cancer.  Eat a healthy diet to maintain a healthy weight, such as more fruits, vegetables, and lean cuts of meat. Decrease the amount of saturated fat in your diet.  Exercise. Being active helps you keep a healthy weight.  Limit your alcohol intake or do not drink alcohol. The more alcohol you drink, the higher your risk.  Do not smoke cigarettes. Smoking can increase your risk of many types of cancer.  Breastfeed your baby. This may help protect you. The longer you breastfeed, the more protection you have.  Talk with your doctor about:  Limiting or stopping hormone therapy.  Taking certain drugs to prevent  breast cancer. For women at high risk of having breast cancer, there are a few drugs that may lower your risk.  Surgery to prevent you from having breast cancer if you are very high risk.  When do I need to call the doctor?   Changes in your breasts  A lump or area in your breast that feels different  Discharge from your nipple  Skin on your breast is dimpled or indented  You have questions or concerns about your breasts  Helpful tips   Talk to your doctor about the best kind of breast cancer screening for you.  If you want to do self breast exams, have your doctor show you the right way to do them.  Tell your doctor of any abnormal finding.  Last Reviewed Date   2021-10-04  Consumer Information Use and Disclaimer   This generalized information is a limited summary of diagnosis, treatment, and/or medication information. It is not meant to be comprehensive and should be used as a tool to help the user understand and/or assess potential diagnostic and treatment options. It does NOT include all information about conditions, treatments, medications, side effects, or risks that may apply to a specific patient. It is not intended to be medical advice or a substitute for the medical advice, diagnosis, or treatment of a health care provider based on the health care provider's examination and assessment of a patient’s specific and unique circumstances. Patients must speak with a health care provider for complete information about their health, medical questions, and treatment options, including any risks or benefits regarding use of medications. This information does not endorse any treatments or medications as safe, effective, or approved for treating a specific patient. Up[employer], Inc[employer] and its affiliates disclaim any warranty or liability relating to this information or the use thereof. The use of this information is governed by the Terms of Use, available at  https://www.woltersTransMedicsuwer.com/en/know/clinical-effectiveness-terms   Copyright   Copyright © 2024 [employer]ToDate, Inc[employer] and its affiliates and/or licensors. All rights reserved.

## 2025-01-03 LAB
HPV HR 12 DNA CVX QL NAA+PROBE: NEGATIVE
HPV16 DNA CVX QL NAA+PROBE: NEGATIVE
HPV18 DNA CVX QL NAA+PROBE: NEGATIVE

## 2025-01-04 LAB
C TRACH DNA SPEC QL NAA+PROBE: NEGATIVE
N GONORRHOEA DNA SPEC QL NAA+PROBE: NEGATIVE

## 2025-01-07 RX ORDER — LIDOCAINE HYDROCHLORIDE 10 MG/ML
0.5 INJECTION, SOLUTION EPIDURAL; INFILTRATION; INTRACAUDAL; PERINEURAL ONCE AS NEEDED
Status: CANCELLED | OUTPATIENT
Start: 2025-01-07

## 2025-01-07 RX ORDER — SODIUM CHLORIDE, SODIUM LACTATE, POTASSIUM CHLORIDE, CALCIUM CHLORIDE 600; 310; 30; 20 MG/100ML; MG/100ML; MG/100ML; MG/100ML
125 INJECTION, SOLUTION INTRAVENOUS CONTINUOUS
Status: CANCELLED | OUTPATIENT
Start: 2025-01-07

## 2025-01-08 ENCOUNTER — ANESTHESIA (OUTPATIENT)
Dept: GASTROENTEROLOGY | Facility: HOSPITAL | Age: 50
End: 2025-01-08
Payer: COMMERCIAL

## 2025-01-08 ENCOUNTER — ANESTHESIA EVENT (OUTPATIENT)
Dept: GASTROENTEROLOGY | Facility: HOSPITAL | Age: 50
End: 2025-01-08
Payer: COMMERCIAL

## 2025-01-08 ENCOUNTER — HOSPITAL ENCOUNTER (OUTPATIENT)
Dept: GASTROENTEROLOGY | Facility: HOSPITAL | Age: 50
Setting detail: OUTPATIENT SURGERY
Discharge: HOME | End: 2025-01-08
Attending: INTERNAL MEDICINE
Payer: COMMERCIAL

## 2025-01-08 VITALS
HEIGHT: 64 IN | RESPIRATION RATE: 18 BRPM | OXYGEN SATURATION: 100 % | BODY MASS INDEX: 21.75 KG/M2 | DIASTOLIC BLOOD PRESSURE: 72 MMHG | WEIGHT: 127.43 LBS | TEMPERATURE: 98 F | HEART RATE: 80 BPM | SYSTOLIC BLOOD PRESSURE: 122 MMHG

## 2025-01-08 DIAGNOSIS — R11.2 NAUSEA AND VOMITING, UNSPECIFIED VOMITING TYPE: ICD-10-CM

## 2025-01-08 DIAGNOSIS — K21.9 GASTROESOPHAGEAL REFLUX DISEASE WITHOUT ESOPHAGITIS: ICD-10-CM

## 2025-01-08 DIAGNOSIS — R10.31 RIGHT LOWER QUADRANT ABDOMINAL PAIN: ICD-10-CM

## 2025-01-08 LAB
EXT PREGNANCY TEST URINE: NEGATIVE
EXT. CONTROL: NORMAL

## 2025-01-08 PROCEDURE — 81025 URINE PREGNANCY TEST: CPT | Performed by: STUDENT IN AN ORGANIZED HEALTH CARE EDUCATION/TRAINING PROGRAM

## 2025-01-08 PROCEDURE — 88305 TISSUE EXAM BY PATHOLOGIST: CPT | Performed by: PATHOLOGY

## 2025-01-08 PROCEDURE — 45380 COLONOSCOPY AND BIOPSY: CPT | Performed by: INTERNAL MEDICINE

## 2025-01-08 PROCEDURE — 43239 EGD BIOPSY SINGLE/MULTIPLE: CPT | Performed by: INTERNAL MEDICINE

## 2025-01-08 RX ORDER — PROPOFOL 10 MG/ML
INJECTION, EMULSION INTRAVENOUS AS NEEDED
Status: DISCONTINUED | OUTPATIENT
Start: 2025-01-08 | End: 2025-01-08

## 2025-01-08 RX ORDER — SODIUM CHLORIDE, SODIUM LACTATE, POTASSIUM CHLORIDE, CALCIUM CHLORIDE 600; 310; 30; 20 MG/100ML; MG/100ML; MG/100ML; MG/100ML
125 INJECTION, SOLUTION INTRAVENOUS CONTINUOUS
Status: DISCONTINUED | OUTPATIENT
Start: 2025-01-08 | End: 2025-01-12 | Stop reason: HOSPADM

## 2025-01-08 RX ORDER — PANTOPRAZOLE SODIUM 40 MG/1
40 TABLET, DELAYED RELEASE ORAL 2 TIMES DAILY
Qty: 180 TABLET | Refills: 3 | Status: SHIPPED | OUTPATIENT
Start: 2025-01-08 | End: 2026-01-03

## 2025-01-08 RX ORDER — SODIUM CHLORIDE, SODIUM LACTATE, POTASSIUM CHLORIDE, CALCIUM CHLORIDE 600; 310; 30; 20 MG/100ML; MG/100ML; MG/100ML; MG/100ML
125 INJECTION, SOLUTION INTRAVENOUS CONTINUOUS
Status: DISCONTINUED | OUTPATIENT
Start: 2025-01-08 | End: 2025-01-08

## 2025-01-08 RX ORDER — LIDOCAINE HYDROCHLORIDE 20 MG/ML
INJECTION, SOLUTION EPIDURAL; INFILTRATION; INTRACAUDAL; PERINEURAL AS NEEDED
Status: DISCONTINUED | OUTPATIENT
Start: 2025-01-08 | End: 2025-01-08

## 2025-01-08 RX ORDER — SODIUM CHLORIDE, SODIUM LACTATE, POTASSIUM CHLORIDE, CALCIUM CHLORIDE 600; 310; 30; 20 MG/100ML; MG/100ML; MG/100ML; MG/100ML
INJECTION, SOLUTION INTRAVENOUS CONTINUOUS PRN
Status: DISCONTINUED | OUTPATIENT
Start: 2025-01-08 | End: 2025-01-08

## 2025-01-08 RX ORDER — LIDOCAINE HYDROCHLORIDE 10 MG/ML
0.5 INJECTION, SOLUTION EPIDURAL; INFILTRATION; INTRACAUDAL; PERINEURAL ONCE AS NEEDED
Status: DISCONTINUED | OUTPATIENT
Start: 2025-01-08 | End: 2025-01-12 | Stop reason: HOSPADM

## 2025-01-08 RX ADMIN — SODIUM CHLORIDE, SODIUM LACTATE, POTASSIUM CHLORIDE, AND CALCIUM CHLORIDE: .6; .31; .03; .02 INJECTION, SOLUTION INTRAVENOUS at 07:08

## 2025-01-08 RX ADMIN — SODIUM CHLORIDE, SODIUM LACTATE, POTASSIUM CHLORIDE, AND CALCIUM CHLORIDE 125 ML/HR: .6; .31; .03; .02 INJECTION, SOLUTION INTRAVENOUS at 06:59

## 2025-01-08 RX ADMIN — PROPOFOL 150 MG: 10 INJECTION, EMULSION INTRAVENOUS at 07:26

## 2025-01-08 RX ADMIN — PROPOFOL 50 MG: 10 INJECTION, EMULSION INTRAVENOUS at 07:30

## 2025-01-08 RX ADMIN — LIDOCAINE HYDROCHLORIDE 50 MG: 20 INJECTION, SOLUTION EPIDURAL; INFILTRATION; INTRACAUDAL; PERINEURAL at 07:26

## 2025-01-08 RX ADMIN — PROPOFOL 100 MG: 10 INJECTION, EMULSION INTRAVENOUS at 07:37

## 2025-01-08 NOTE — INTERVAL H&P NOTE
H&P reviewed. After examining the patient I find no changes in the patients condition since the H&P had been written.    Vitals:    01/08/25 0646   BP: 122/78   Pulse: 93   Resp: 20   Temp: (!) 97.2 °F (36.2 °C)   SpO2: 100%

## 2025-01-08 NOTE — ANESTHESIA PREPROCEDURE EVALUATION
Procedure:  COLONOSCOPY  EGD    Update: Discussed with patient her irritated left eye that began last night. She does normally wear contacts, not today. She would like to continue the procedure as planned, and she will follow up with her eye doctor today on my recommendation.    Relevant Problems   CARDIO   (+) Migraine without aura and without status migrainosus, not intractable      GI/HEPATIC   (+) Gastroesophageal reflux disease without esophagitis      MUSCULOSKELETAL   (+) Back pain with radiculopathy   (+) Chronic midline low back pain without sciatica      NEURO/PSYCH   (+) Anxiety   (+) Chronic midline low back pain without sciatica   (+) Chronic pain disorder   (+) Depression, recurrent (HCC)   (+) Migraine without aura and without status migrainosus, not intractable        Physical Exam    Airway    Mallampati score: II  TM Distance: >3 FB  Neck ROM: full     Dental   No notable dental hx     Cardiovascular  Rhythm: regular, Rate: normal, Cardiovascular exam normal    Pulmonary  Pulmonary exam normal Breath sounds clear to auscultation    Other Findings  Red, irritated left eye. Difficulty keeping open. No obvious pus or injury.post-pubertal.      Anesthesia Plan  ASA Score- 2     Anesthesia Type- IV sedation with anesthesia with ASA Monitors.         Additional Monitors:     Airway Plan:     Comment: Discussed risks/benefits, including medication reactions, awareness, aspiration, and serious/life threatening complications. Plan to maintain native airway with IVGA, monitored with EtCO2.       Plan Factors-Exercise tolerance (METS): >4 METS.    Chart reviewed.    Patient summary reviewed.      Patient instructed to abstain from smoking on day of procedure. Patient did not smoke on day of surgery.            Induction- intravenous.    Postoperative Plan-         Informed Consent- Anesthetic plan and risks discussed with patient.  I personally reviewed this patient with the CRNA. Discussed and agreed on the  Anesthesia Plan with the CRNA..

## 2025-01-08 NOTE — ANESTHESIA POSTPROCEDURE EVALUATION
Post-Op Assessment Note    CV Status:  Stable  Pain Score: 0    Pain management: adequate       Mental Status:  Sleepy   Hydration Status:  Stable   PONV Controlled:  None   Airway Patency:  Patent     Post Op Vitals Reviewed: Yes    No anethesia notable event occurred.    Staff: CRNA           Last Filed PACU Vitals:  Vitals Value Taken Time   Temp 98    Pulse 76    /68    Resp 16    SpO2 98        Modified Carmine:     Vitals Value Taken Time   Activity 2 01/08/25 0811   Respiration 2 01/08/25 0811   Circulation 2 01/08/25 0811   Consciousness 2 01/08/25 0811   Oxygen Saturation 2 01/08/25 0811     Modified Carmine Score: 10

## 2025-01-09 ENCOUNTER — RESULTS FOLLOW-UP (OUTPATIENT)
Age: 50
End: 2025-01-09

## 2025-01-09 ENCOUNTER — TELEPHONE (OUTPATIENT)
Dept: GASTROENTEROLOGY | Facility: CLINIC | Age: 50
End: 2025-01-09

## 2025-01-09 LAB
LAB AP GYN PRIMARY INTERPRETATION: ABNORMAL
Lab: ABNORMAL
PATH INTERP SPEC-IMP: ABNORMAL

## 2025-01-09 PROCEDURE — G0124 SCREEN C/V THIN LAYER BY MD: HCPCS | Performed by: PATHOLOGY

## 2025-01-09 NOTE — TELEPHONE ENCOUNTER
----- Message from Alejandro Veloz MD sent at 1/8/2025  7:48 AM EST -----  Please make sure she has follow-up with a PA in approximately 6 to 8 weeks

## 2025-01-10 ENCOUNTER — RESULTS FOLLOW-UP (OUTPATIENT)
Dept: GASTROENTEROLOGY | Facility: CLINIC | Age: 50
End: 2025-01-10

## 2025-01-10 PROCEDURE — 88305 TISSUE EXAM BY PATHOLOGIST: CPT | Performed by: PATHOLOGY

## 2025-01-15 ENCOUNTER — TELEPHONE (OUTPATIENT)
Age: 50
End: 2025-01-15

## 2025-01-15 NOTE — TELEPHONE ENCOUNTER
----- Message from Alejandro Veloz MD sent at 1/15/2025  7:26 AM EST -----  Please review with the patient

## 2025-01-17 ENCOUNTER — TELEPHONE (OUTPATIENT)
Age: 50
End: 2025-01-17

## 2025-01-17 NOTE — TELEPHONE ENCOUNTER
Patient says that both big toes have been hurting her and she noticed that one is red and very tender.  The other is red and purple and has white on the tip.  She wonders if it's frost bite.  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  [facility]  Please look out for this.  Thank you.

## 2025-01-18 DIAGNOSIS — E66.3 OVERWEIGHT: ICD-10-CM

## 2025-01-18 DIAGNOSIS — R41.840 ATTENTION AND CONCENTRATION DEFICIT: ICD-10-CM

## 2025-01-18 DIAGNOSIS — F41.9 ANXIETY: ICD-10-CM

## 2025-01-18 RX ORDER — TIRZEPATIDE 15 MG/.5ML
15 INJECTION, SOLUTION SUBCUTANEOUS WEEKLY
Qty: 2 ML | Refills: 0 | Status: CANCELLED | OUTPATIENT
Start: 2025-01-18

## 2025-01-20 DIAGNOSIS — E66.3 OVERWEIGHT: ICD-10-CM

## 2025-01-20 RX ORDER — TIRZEPATIDE 15 MG/.5ML
15 INJECTION, SOLUTION SUBCUTANEOUS WEEKLY
Qty: 2 ML | Refills: 0 | Status: SHIPPED | OUTPATIENT
Start: 2025-01-20

## 2025-01-20 RX ORDER — DEXTROAMPHETAMINE SACCHARATE, AMPHETAMINE ASPARTATE, DEXTROAMPHETAMINE SULFATE AND AMPHETAMINE SULFATE 7.5; 7.5; 7.5; 7.5 MG/1; MG/1; MG/1; MG/1
30 TABLET ORAL 2 TIMES DAILY
Qty: 60 TABLET | Refills: 0 | Status: SHIPPED | OUTPATIENT
Start: 2025-01-20

## 2025-01-20 RX ORDER — ALPRAZOLAM 1 MG/1
1 TABLET ORAL 2 TIMES DAILY PRN
Qty: 60 TABLET | Refills: 0 | Status: SHIPPED | OUTPATIENT
Start: 2025-01-20

## 2025-02-10 DIAGNOSIS — E66.3 OVERWEIGHT: ICD-10-CM

## 2025-02-11 RX ORDER — TIRZEPATIDE 15 MG/.5ML
15 INJECTION, SOLUTION SUBCUTANEOUS WEEKLY
Qty: 2 ML | Refills: 0 | Status: SHIPPED | OUTPATIENT
Start: 2025-02-11

## 2025-02-14 ENCOUNTER — OFFICE VISIT (OUTPATIENT)
Dept: FAMILY MEDICINE CLINIC | Facility: CLINIC | Age: 50
End: 2025-02-14
Payer: COMMERCIAL

## 2025-02-14 VITALS
OXYGEN SATURATION: 98 % | WEIGHT: 124.2 LBS | HEIGHT: 64 IN | DIASTOLIC BLOOD PRESSURE: 78 MMHG | SYSTOLIC BLOOD PRESSURE: 124 MMHG | HEART RATE: 77 BPM | BODY MASS INDEX: 21.21 KG/M2

## 2025-02-14 DIAGNOSIS — E66.3 OVERWEIGHT: ICD-10-CM

## 2025-02-14 DIAGNOSIS — Z00.00 ANNUAL PHYSICAL EXAM: Primary | ICD-10-CM

## 2025-02-14 DIAGNOSIS — F41.9 ANXIETY: ICD-10-CM

## 2025-02-14 DIAGNOSIS — F33.9 DEPRESSION, RECURRENT (HCC): ICD-10-CM

## 2025-02-14 DIAGNOSIS — Z00.00 HEALTHCARE MAINTENANCE: ICD-10-CM

## 2025-02-14 PROCEDURE — 99214 OFFICE O/P EST MOD 30 MIN: CPT | Performed by: NURSE PRACTITIONER

## 2025-02-14 PROCEDURE — 99396 PREV VISIT EST AGE 40-64: CPT | Performed by: NURSE PRACTITIONER

## 2025-02-14 NOTE — ASSESSMENT & PLAN NOTE
Depression Screening Follow-up Plan: Patient's depression screening was positive with a PHQ-9 score of 15. Patient with underlying depression and was advised to continue current medications as prescribed.  Patient has tried many antidepressants which have not worked.  She has seen psychiatry many times.

## 2025-02-14 NOTE — PROGRESS NOTES
Adult Annual Physical  Name: Ana M Sanchez      : 1975      MRN: [id number]  Encounter Provider: VAMSHI Patel  Encounter Date: 2025   Encounter department: [facility] [address]    Assessment & Plan  Healthcare maintenance    Orders:    CBC and differential; Future    Comprehensive metabolic panel; Future    Lipid panel; Future    TSH, 3rd generation with Free T4 reflex; Future    Depression, recurrent (HCC)  Depression Screening Follow-up Plan: Patient's depression screening was positive with a PHQ-9 score of 15. Patient with underlying depression and was advised to continue current medications as prescribed.  Patient has tried many antidepressants which have not worked.  She has seen psychiatry many times.       Anxiety  Continue Xanax sparingly as needed.  Patient has tried many controller medications for anxiety that have not worked.       Overweight  Prior Authorization Clinical Questions for Weight Management Pharmacotherapy    2. Does the patient have a diagnosis of obesity, confirmed by a BMI greater than or equal to 30 kg/m^2?: No  3. Does the patient have a BMI of greater than or equal to 27 kg/m^2 with at least one weight-related comorbidity/risk factor/complication (e.g. diabetes, dyslipidemia, coronary artery disease)?: No  9. Does the patient have a history of type 2 diabetes?: No     Baseline weight (in pounds): 170 lbs  Current weight (in pounds): 124 lbs  Weight loss percentage: -27.06%     Patient has lost almost 50 pounds with Zepbound.  Advised to back off on the Zepbound each month.  We will wean her off of Zepbound and monitor how she does.  Patient is very happy with her weight loss.         Annual physical exam         Immunizations and preventive care screenings were discussed with patient today. Appropriate education was printed on patient's after visit summary.    Counseling:  Exercise: the importance of regular exercise/physical  activity was discussed. Recommend exercise 3-5 times per week for at least 30 minutes.       Depression Screening and Follow-up Plan: Patient's depression screening was positive with a PHQ-9 score of 15.   Patient with underlying depression and was advised to continue current medications as prescribed.         History of Present Illness     Adult Annual Physical:  Patient presents for annual physical. Patient presents for annual physical. Concerned with right ear. .     Diet and Physical Activity:  - Diet/Nutrition: well balanced diet.  - Exercise: no formal exercise.    Depression Screening:    - PHQ-9 Score: 15    General Health:  - Sleep: sleeps poorly.  - Hearing: normal hearing bilateral ears.  - Vision: wears contacts and goes for regular eye exams.  - Dental: regular dental visits.    /GYN Health:  - Follows with GYN: yes.     Review of Systems   Constitutional:  Negative for chills, diaphoresis and fever.   HENT:  Negative for ear pain and sore throat.    Eyes:  Negative for pain and visual disturbance.   Respiratory:  Negative for cough and shortness of breath.    Cardiovascular:  Negative for chest pain and palpitations.   Gastrointestinal:  Negative for abdominal pain and vomiting.   Genitourinary:  Negative for dysuria and hematuria.   Musculoskeletal:  Negative for arthralgias and back pain.   Skin:  Negative for color change and rash.   Neurological:  Negative for dizziness, seizures, syncope, light-headedness and headaches.   Psychiatric/Behavioral:  Positive for dysphoric mood. The patient is nervous/anxious.    All other systems reviewed and are negative.    Current Outpatient Medications on File Prior to Visit   Medication Sig Dispense Refill    ALPRAZolam (XANAX) 1 mg tablet Take 1 tablet (1 mg total) by mouth 2 (two) times a day as needed for anxiety 60 tablet 0    amitriptyline (ELAVIL) 75 mg tablet TAKE 1 TABLET (75 MG TOTAL) BY MOUTH DAILY AT BEDTIME 90 tablet 1     "amphetamine-dextroamphetamine (ADDERALL, 30MG,) 30 MG tablet Take 1 tablet (30 mg total) by mouth 2 (two) times a day Max Daily Amount: 60 mg 60 tablet 0    famotidine (PEPCID) 20 mg tablet Take 1 tablet (20 mg total) by mouth daily at bedtime 90 tablet 3    ondansetron (ZOFRAN-ODT) 4 mg disintegrating tablet Take 1 tablet (4 mg total) by mouth every 6 (six) hours as needed for nausea or vomiting 20 tablet 0    pantoprazole (PROTONIX) 40 mg tablet Take 1 tablet (40 mg total) by mouth 2 (two) times a day 180 tablet 3    tirzepatide (Zepbound) 15 mg/0.5 mL auto-injector Inject 0.5 mL (15 mg total) under the skin once a week 2 mL 0    valACYclovir (VALTREX) 1,000 mg tablet Take 1 tablet (1,000 mg total) by mouth 2 (two) times a day for 10 days 20 tablet 0     No current facility-administered medications on file prior to visit.        Objective   /78   Pulse 77   Ht 5' 4\" (1.626 m)   Wt 56.3 kg (124 lb 3.2 oz)   SpO2 98%   BMI 21.32 kg/m²     Physical Exam  Vitals and nursing note reviewed.   Constitutional:       General: She is not in acute distress.     Appearance: She is well-developed.   HENT:      Head: Normocephalic and atraumatic.      Right Ear: Tympanic membrane normal.      Left Ear: Tympanic membrane normal.      Nose: No congestion.      Mouth/Throat:      Pharynx: No oropharyngeal exudate.   Eyes:      Conjunctiva/sclera: Conjunctivae normal.   Cardiovascular:      Rate and Rhythm: Normal rate and regular rhythm.      Heart sounds: No murmur heard.  Pulmonary:      Effort: Pulmonary effort is normal. No respiratory distress.      Breath sounds: Normal breath sounds.   Abdominal:      Palpations: Abdomen is soft.      Tenderness: There is no abdominal tenderness.   Musculoskeletal:         General: No swelling.      Cervical back: Neck supple.   Skin:     General: Skin is warm and dry.      Capillary Refill: Capillary refill takes less than 2 seconds.   Neurological:      Mental Status: She is " alert and oriented to person, place, and time.   Psychiatric:         Mood and Affect: Mood normal.       Administrative Statements   I have spent a total time of 20 minutes in caring for this patient on the day of the visit/encounter including Documenting in the medical record, Reviewing / ordering tests, medicine, procedures  , and Obtaining or reviewing history  . Topics discussed with the patient / family include symptom assessment and management and medication review.

## 2025-02-14 NOTE — ASSESSMENT & PLAN NOTE
Continue Xanax sparingly as needed.  Patient has tried many controller medications for anxiety that have not worked.

## 2025-02-23 DIAGNOSIS — R41.840 ATTENTION AND CONCENTRATION DEFICIT: ICD-10-CM

## 2025-02-24 RX ORDER — DEXTROAMPHETAMINE SACCHARATE, AMPHETAMINE ASPARTATE, DEXTROAMPHETAMINE SULFATE AND AMPHETAMINE SULFATE 7.5; 7.5; 7.5; 7.5 MG/1; MG/1; MG/1; MG/1
30 TABLET ORAL 2 TIMES DAILY
Qty: 60 TABLET | Refills: 0 | Status: SHIPPED | OUTPATIENT
Start: 2025-02-24

## 2025-03-08 DIAGNOSIS — E66.3 OVERWEIGHT: ICD-10-CM

## 2025-03-08 DIAGNOSIS — B00.1 RECURRENT COLD SORES: ICD-10-CM

## 2025-03-08 RX ORDER — TIRZEPATIDE 15 MG/.5ML
15 INJECTION, SOLUTION SUBCUTANEOUS WEEKLY
Qty: 2 ML | Refills: 0 | Status: SHIPPED | OUTPATIENT
Start: 2025-03-08

## 2025-03-08 RX ORDER — VALACYCLOVIR HYDROCHLORIDE 1 G/1
1000 TABLET, FILM COATED ORAL 2 TIMES DAILY
Qty: 20 TABLET | Refills: 0 | Status: SHIPPED | OUTPATIENT
Start: 2025-03-08 | End: 2025-03-18

## 2025-03-11 ENCOUNTER — TELEPHONE (OUTPATIENT)
Dept: FAMILY MEDICINE CLINIC | Facility: CLINIC | Age: 50
End: 2025-03-11

## 2025-03-24 NOTE — TELEPHONE ENCOUNTER
Per patient message dated 3/24, pt is requesting the PA to be submitted through her [employer].     PA for Zepbound) 15 mg/0.5 mL auto-injector RESUBMITTED to     via    [x]CM-KEY: [id number]  []Surescripts-Case ID #   []Availity-Auth ID # NDC #   []Faxed to plan   []Other website   []Phone call Case ID #     [x]PA sent as URGENT    All office notes, labs and other pertaining documents and studies sent. Clinical questions answered. Awaiting determination from insurance company.     Turnaround time for your insurance to make a decision on your Prior Authorization can take 7-21 business days.

## 2025-03-24 NOTE — TELEPHONE ENCOUNTER
PA for Zepbound 15 mg APPROVED     Date(s) approved 03/24/2025 to 09/24/2025  All strengths of the drug are approved      Patient advised by          []MyChart Message  []Phone call   []LMOM  []L/M to call office as no active Communication consent on file  []Unable to leave detailed message as VM not approved on Communication consent       Pharmacy advised by    [x]Fax  []Phone call  []Secure Chat    Specialty Pharmacy    []     Approval letter scanned into Media Yes

## 2025-04-21 DIAGNOSIS — R41.840 ATTENTION AND CONCENTRATION DEFICIT: ICD-10-CM

## 2025-04-21 DIAGNOSIS — E66.3 OVERWEIGHT: ICD-10-CM

## 2025-04-21 DIAGNOSIS — F41.9 ANXIETY: ICD-10-CM

## 2025-04-22 ENCOUNTER — NURSE TRIAGE (OUTPATIENT)
Age: 50
End: 2025-04-22

## 2025-04-22 DIAGNOSIS — E66.3 OVERWEIGHT: ICD-10-CM

## 2025-04-22 RX ORDER — TIRZEPATIDE 15 MG/.5ML
15 INJECTION, SOLUTION SUBCUTANEOUS WEEKLY
Qty: 2 ML | Refills: 0 | Status: SHIPPED | OUTPATIENT
Start: 2025-04-22 | End: 2025-04-22 | Stop reason: SDUPTHER

## 2025-04-22 RX ORDER — ALPRAZOLAM 1 MG/1
1 TABLET ORAL 2 TIMES DAILY PRN
Qty: 60 TABLET | Refills: 0 | Status: SHIPPED | OUTPATIENT
Start: 2025-04-22

## 2025-04-22 RX ORDER — TIRZEPATIDE 15 MG/.5ML
15 INJECTION, SOLUTION SUBCUTANEOUS WEEKLY
Qty: 2 ML | Refills: 0 | Status: SHIPPED | OUTPATIENT
Start: 2025-04-22

## 2025-04-22 RX ORDER — DEXTROAMPHETAMINE SACCHARATE, AMPHETAMINE ASPARTATE, DEXTROAMPHETAMINE SULFATE AND AMPHETAMINE SULFATE 7.5; 7.5; 7.5; 7.5 MG/1; MG/1; MG/1; MG/1
30 TABLET ORAL 2 TIMES DAILY
Qty: 60 TABLET | Refills: 0 | Status: SHIPPED | OUTPATIENT
Start: 2025-04-22

## 2025-04-22 NOTE — TELEPHONE ENCOUNTER
Regarding: Pharmacy change  ----- Message from Angeles TAVERAS sent at 4/22/2025  3:03 PM EDT -----  Patient's Zepbound needs to be sent to the [facility] pharmacy.     Please assist with re routing medication.   Thank you!

## 2025-04-22 NOTE — TELEPHONE ENCOUNTER
"FOLLOW UP: Zepbound prescription sent to [facility], as requested by patient, per [facility] Rx Refill Protocol.    REASON FOR CONVERSATION: Medication Problem    SYMPTOMS: prescription sent to wrong pharmacy today    OTHER: Sent to pharmacy (4/22/2025  3:10 PM EDT)    DISPOSITION: Home Care      Reason for Disposition   Prescription prescribed recently is not at pharmacy and triager has access to patient's EMR and prescription is recorded in the EMR    Answer Assessment - Initial Assessment Questions  1. NAME of MEDICINE: \"What medicine(s) are you calling about?\"      Zepbound    2. QUESTION: \"What is your question?\" (e.g., double dose of medicine, side effect)      Wrong pharmacy    3. PRESCRIBER: \"Who prescribed the medicine?\" Reason: if prescribed by specialist, call should be referred to that group.      PCP    Protocols used: Medication Question Call-Adult-[place]    "

## 2025-05-16 ENCOUNTER — OFFICE VISIT (OUTPATIENT)
Dept: FAMILY MEDICINE CLINIC | Facility: CLINIC | Age: 50
End: 2025-05-16
Payer: COMMERCIAL

## 2025-05-16 ENCOUNTER — TELEPHONE (OUTPATIENT)
Age: 50
End: 2025-05-16

## 2025-05-16 VITALS
OXYGEN SATURATION: 96 % | HEIGHT: 64 IN | DIASTOLIC BLOOD PRESSURE: 64 MMHG | SYSTOLIC BLOOD PRESSURE: 112 MMHG | HEART RATE: 110 BPM | WEIGHT: 118 LBS | BODY MASS INDEX: 20.14 KG/M2

## 2025-05-16 DIAGNOSIS — K21.9 GASTROESOPHAGEAL REFLUX DISEASE WITHOUT ESOPHAGITIS: ICD-10-CM

## 2025-05-16 DIAGNOSIS — G89.29 CHRONIC MIDLINE LOW BACK PAIN WITH BILATERAL SCIATICA: Primary | ICD-10-CM

## 2025-05-16 DIAGNOSIS — G56.03 CARPAL TUNNEL SYNDROME, BILATERAL: ICD-10-CM

## 2025-05-16 DIAGNOSIS — Z12.31 ENCOUNTER FOR SCREENING MAMMOGRAM FOR MALIGNANT NEOPLASM OF BREAST: ICD-10-CM

## 2025-05-16 DIAGNOSIS — R20.2 NUMBNESS AND TINGLING OF HAND: ICD-10-CM

## 2025-05-16 DIAGNOSIS — F33.9 DEPRESSION, RECURRENT (HCC): ICD-10-CM

## 2025-05-16 DIAGNOSIS — K58.0 IRRITABLE BOWEL SYNDROME WITH DIARRHEA: ICD-10-CM

## 2025-05-16 DIAGNOSIS — M54.42 CHRONIC MIDLINE LOW BACK PAIN WITH BILATERAL SCIATICA: Primary | ICD-10-CM

## 2025-05-16 DIAGNOSIS — R41.840 ATTENTION AND CONCENTRATION DEFICIT: ICD-10-CM

## 2025-05-16 DIAGNOSIS — R20.0 NUMBNESS AND TINGLING OF HAND: ICD-10-CM

## 2025-05-16 DIAGNOSIS — F41.9 ANXIETY: ICD-10-CM

## 2025-05-16 DIAGNOSIS — M54.41 CHRONIC MIDLINE LOW BACK PAIN WITH BILATERAL SCIATICA: Primary | ICD-10-CM

## 2025-05-16 DIAGNOSIS — R19.7 DIARRHEA, UNSPECIFIED TYPE: ICD-10-CM

## 2025-05-16 DIAGNOSIS — E66.3 OVERWEIGHT: ICD-10-CM

## 2025-05-16 PROBLEM — R10.31 RIGHT LOWER QUADRANT ABDOMINAL PAIN: Status: RESOLVED | Noted: 2024-12-26 | Resolved: 2025-05-16

## 2025-05-16 PROBLEM — M54.50 CHRONIC MIDLINE LOW BACK PAIN WITHOUT SCIATICA: Status: RESOLVED | Noted: 2017-07-27 | Resolved: 2025-05-16

## 2025-05-16 PROCEDURE — 99214 OFFICE O/P EST MOD 30 MIN: CPT | Performed by: NURSE PRACTITIONER

## 2025-05-16 RX ORDER — TIRZEPATIDE 12.5 MG/.5ML
12.5 INJECTION, SOLUTION SUBCUTANEOUS WEEKLY
Qty: 2 ML | Refills: 0 | Status: SHIPPED | OUTPATIENT
Start: 2025-05-16

## 2025-05-16 RX ORDER — DICYCLOMINE HCL 20 MG
20 TABLET ORAL EVERY 6 HOURS
Qty: 120 TABLET | Refills: 0 | Status: SHIPPED | OUTPATIENT
Start: 2025-05-16

## 2025-05-16 NOTE — TELEPHONE ENCOUNTER
PA for ZEPBOUND 12.5MG SUBMITTED               via    [x]CMM-KEY [id number]    [x]PA sent as URGENT    All office notes, labs and other pertaining documents and studies sent. Clinical questions answered. Awaiting determination from insurance company.     Turnaround time for your insurance to make a decision on your Prior Authorization can take 7-21 business days.

## 2025-05-16 NOTE — PROGRESS NOTES
Name: Ana M Sanchez      : 1975      MRN: [id number]  Encounter Provider: VAMSHI Patel  Encounter Date: 2025   Encounter department: [facility] [address]  :  Assessment & Plan  Chronic midline low back pain with bilateral sciatica  Will refer to spine and pain as patient continues with chronic back pain.  She states she did have an MRI several years ago, no imaging available in epic.  She states she is unable to have MRIs unless she is fully sedated.  Orders:    Ambulatory referral to Spine & Pain Management; Future    Numbness and tingling of hand  Will obtain ultrasound carpal tunnel.  Patient states she had carpal tunnel surgery between the years of 2017 and 2019.  Orders:    US Carpal Tunnel; Future    Overweight  Patient has lost about 50 pounds with Zepbound.  Discussed that we will wean her off of the medication.  Will start on 12.5 mg weekly for 1 month.  Each month she is to let me know when she is ready to decrease medication.  Advised healthy diet and daily exercise.    Orders:    tirzepatide (Zepbound) 12.5 mg/0.5 mL auto-injector; Inject 0.5 mL (12.5 mg total) under the skin once a week    Encounter for screening mammogram for malignant neoplasm of breast    Orders:    Mammo screening bilateral w 3d and cad; Future    Diarrhea, unspecified type  Advised to follow back up with GI as this continues to be an issue.  Given Bentyl to use as needed.  Orders:    dicyclomine (BENTYL) 20 mg tablet; Take 1 tablet (20 mg total) by mouth every 6 (six) hours    Gastroesophageal reflux disease without esophagitis  Continue on Protonix twice daily.       Irritable bowel syndrome with diarrhea  Advised to follow back up with GI.       Carpal tunnel syndrome, bilateral         Anxiety  Doing okay currently with medications.  Advised to use Xanax sparingly as needed.       Depression, recurrent (HCC)  She is doing okay currently.         Attention and  "concentration deficit  Doing okay with Adderall.              History of Present Illness   At least once a month, has abd issues and feels like it's on fire and some cramping. Usually with diarrhea and vomiting. Her back has been killing her. Hurts all the time. Some numbness tingling in the toes. Coincerned with hand numbness and tingling. Had carpal tunnel surgery in [address] [place] [address].       Review of Systems   Constitutional:  Negative for chills and fever.   HENT:  Negative for ear pain and sore throat.    Eyes:  Negative for pain and visual disturbance.   Respiratory:  Negative for cough and shortness of breath.    Cardiovascular:  Negative for chest pain and palpitations.   Gastrointestinal:  Positive for abdominal pain. Negative for constipation, diarrhea, nausea and vomiting.   Genitourinary:  Negative for dysuria, frequency and hematuria.   Musculoskeletal:  Negative for arthralgias and back pain.   Skin:  Negative for color change and rash.   Neurological:  Negative for seizures and syncope.   Psychiatric/Behavioral:  Positive for dysphoric mood. The patient is nervous/anxious.    All other systems reviewed and are negative.      Objective   /64   Pulse (!) 110   Ht 5' 4\" (1.626 m)   Wt 53.5 kg (118 lb)   SpO2 96%   BMI 20.25 kg/m²      Physical Exam  Vitals and nursing note reviewed.   Constitutional:       General: She is not in acute distress.     Appearance: She is well-developed.   HENT:      Head: Normocephalic and atraumatic.     Eyes:      Conjunctiva/sclera: Conjunctivae normal.       Cardiovascular:      Rate and Rhythm: Normal rate and regular rhythm.      Heart sounds: No murmur heard.  Pulmonary:      Effort: Pulmonary effort is normal. No respiratory distress.      Breath sounds: Normal breath sounds.   Abdominal:      Palpations: Abdomen is soft.      Tenderness: There is no abdominal tenderness.     Musculoskeletal:         General: No swelling.      Cervical back: Neck supple.     Skin:   "   General: Skin is warm and dry.      Capillary Refill: Capillary refill takes less than 2 seconds.     Neurological:      Mental Status: She is alert and oriented to person, place, and time.     Psychiatric:         Mood and Affect: Mood normal.

## 2025-05-16 NOTE — ASSESSMENT & PLAN NOTE
Will refer to spine and pain as patient continues with chronic back pain.  She states she did have an MRI several years ago, no imaging available in epic.  She states she is unable to have MRIs unless she is fully sedated.  Orders:    Ambulatory referral to Spine & Pain Management; Future

## 2025-05-20 NOTE — TELEPHONE ENCOUNTER
PA for ZEPBOUND 12.5MG  NOT REQUIRED     Reason (screenshot if applicable)          Patient advised by          [x] MyChart Message  [] Phone call   []LMOM  []L/M to call office as no active Communication consent on file  []Unable to leave detailed message as VM not approved on Communication consent       Pharmacy advised by    [x]Fax  []Phone call

## 2025-06-10 DIAGNOSIS — R41.840 ATTENTION AND CONCENTRATION DEFICIT: ICD-10-CM

## 2025-06-10 DIAGNOSIS — B00.1 RECURRENT COLD SORES: ICD-10-CM

## 2025-06-10 RX ORDER — VALACYCLOVIR HYDROCHLORIDE 1 G/1
1000 TABLET, FILM COATED ORAL 2 TIMES DAILY
Qty: 20 TABLET | Refills: 0 | Status: SHIPPED | OUTPATIENT
Start: 2025-06-10 | End: 2025-06-20

## 2025-06-10 RX ORDER — DEXTROAMPHETAMINE SACCHARATE, AMPHETAMINE ASPARTATE, DEXTROAMPHETAMINE SULFATE AND AMPHETAMINE SULFATE 7.5; 7.5; 7.5; 7.5 MG/1; MG/1; MG/1; MG/1
30 TABLET ORAL 2 TIMES DAILY
Qty: 60 TABLET | Refills: 0 | Status: SHIPPED | OUTPATIENT
Start: 2025-06-10

## 2025-07-07 DIAGNOSIS — E66.3 OVERWEIGHT: ICD-10-CM

## 2025-07-10 RX ORDER — TIRZEPATIDE 12.5 MG/.5ML
12.5 INJECTION, SOLUTION SUBCUTANEOUS WEEKLY
Qty: 2 ML | Refills: 0 | Status: SHIPPED | OUTPATIENT
Start: 2025-07-10

## 2025-08-04 DIAGNOSIS — R11.2 NAUSEA AND VOMITING, UNSPECIFIED VOMITING TYPE: ICD-10-CM

## 2025-08-04 DIAGNOSIS — R41.840 ATTENTION AND CONCENTRATION DEFICIT: ICD-10-CM

## 2025-08-04 DIAGNOSIS — E66.3 OVERWEIGHT: ICD-10-CM

## 2025-08-04 DIAGNOSIS — F41.9 ANXIETY: ICD-10-CM

## 2025-08-05 DIAGNOSIS — E66.3 OVERWEIGHT: Primary | ICD-10-CM

## 2025-08-05 RX ORDER — DEXTROAMPHETAMINE SACCHARATE, AMPHETAMINE ASPARTATE, DEXTROAMPHETAMINE SULFATE AND AMPHETAMINE SULFATE 7.5; 7.5; 7.5; 7.5 MG/1; MG/1; MG/1; MG/1
30 TABLET ORAL 2 TIMES DAILY
Qty: 60 TABLET | Refills: 0 | Status: SHIPPED | OUTPATIENT
Start: 2025-08-05

## 2025-08-05 RX ORDER — TIRZEPATIDE 10 MG/.5ML
10 INJECTION, SOLUTION SUBCUTANEOUS WEEKLY
Qty: 2 ML | Refills: 0 | Status: SHIPPED | OUTPATIENT
Start: 2025-08-05

## 2025-08-05 RX ORDER — TIRZEPATIDE 12.5 MG/.5ML
12.5 INJECTION, SOLUTION SUBCUTANEOUS WEEKLY
Qty: 2 ML | Refills: 0 | OUTPATIENT
Start: 2025-08-05

## 2025-08-05 RX ORDER — ALPRAZOLAM 1 MG/1
1 TABLET ORAL 2 TIMES DAILY PRN
Qty: 60 TABLET | Refills: 0 | Status: SHIPPED | OUTPATIENT
Start: 2025-08-05

## 2025-08-06 RX ORDER — PANTOPRAZOLE SODIUM 40 MG/1
40 TABLET, DELAYED RELEASE ORAL 2 TIMES DAILY
Qty: 180 TABLET | Refills: 1 | Status: SHIPPED | OUTPATIENT
Start: 2025-08-06 | End: 2026-02-02

## 2025-08-12 ENCOUNTER — OFFICE VISIT (OUTPATIENT)
Dept: FAMILY MEDICINE CLINIC | Facility: CLINIC | Age: 50
End: 2025-08-12
Payer: COMMERCIAL

## (undated) DEVICE — 3M™ TEGADERM™ TRANSPARENT FILM DRESSING FRAME STYLE, 1624W, 2-3/8 IN X 2-3/4 IN (6 CM X 7 CM), 100/CT 4CT/CASE: Brand: 3M™ TEGADERM™

## (undated) DEVICE — SUT VICRYL 0 UR-6 27 IN J603H

## (undated) DEVICE — GAUZE SPONGES,16 PLY: Brand: CURITY

## (undated) DEVICE — SUT VICRYL 4-0 PS-2 27 IN J426H

## (undated) DEVICE — [HIGH FLOW INSUFFLATOR,  DO NOT USE IF PACKAGE IS DAMAGED,  KEEP DRY,  KEEP AWAY FROM SUNLIGHT,  PROTECT FROM HEAT AND RADIOACTIVE SOURCES.]: Brand: PNEUMOSURE

## (undated) DEVICE — ENDOPATH XCEL BLADELESS TROCARS WITH STABILITY SLEEVES: Brand: ENDOPATH XCEL

## (undated) DEVICE — GLOVE SRG BIOGEL ECLIPSE 7.5

## (undated) DEVICE — REM POLYHESIVE ADULT PATIENT RETURN ELECTRODE: Brand: VALLEYLAB

## (undated) DEVICE — LIGHT HANDLE COVER CAMERA DISP

## (undated) DEVICE — INTENDED FOR TISSUE SEPARATION, AND OTHER PROCEDURES THAT REQUIRE A SHARP SURGICAL BLADE TO PUNCTURE OR CUT.: Brand: BARD-PARKER SAFETY BLADES SIZE 15, STERILE

## (undated) DEVICE — SORBAFIX ABSORBABLE FIXATION SYSTEM 30 ABSORBABLE FASTENERS: Brand: SORBAFIX ABSORBABLE FIXATION SYSTEM

## (undated) DEVICE — 3M™ STERI-STRIP™ REINFORCED ADHESIVE SKIN CLOSURES, R1546, 1/4 IN X 4 IN (6 MM X 100 MM), 10 STRIPS/ENVELOPE: Brand: 3M™ STERI-STRIP™

## (undated) DEVICE — SCD SEQUENTIAL COMPRESSION COMFORT SLEEVE MEDIUM KNEE LENGTH: Brand: KENDALL SCD

## (undated) DEVICE — TROCAR HERNIA OVAL PERITONEAL DISTENTION BALLOON

## (undated) DEVICE — 3M™ STERI-STRIP™ REINFORCED ADHESIVE SKIN CLOSURES, R1541, 1/4 IN X 3 IN (6 MM X 75 MM), 3 STRIPS/ENVELOPE: Brand: 3M™ STERI-STRIP™

## (undated) DEVICE — CHLORAPREP HI-LITE 26ML ORANGE

## (undated) DEVICE — ENDOPATH XCEL UNIVERSAL TROCAR STABLILITY SLEEVES: Brand: ENDOPATH XCEL

## (undated) DEVICE — GAUZE SPONGES,8 PLY: Brand: CURITY

## (undated) DEVICE — TROCAR HERNIA BALLON STRUCTURED 10 MM

## (undated) DEVICE — ALLENTOWN LAP CHOLE APP PACK: Brand: CARDINAL HEALTH

## (undated) DEVICE — GLOVE INDICATOR PI UNDERGLOVE SZ 7.5 BLUE